# Patient Record
Sex: FEMALE | Race: BLACK OR AFRICAN AMERICAN | NOT HISPANIC OR LATINO | Employment: UNEMPLOYED | URBAN - METROPOLITAN AREA
[De-identification: names, ages, dates, MRNs, and addresses within clinical notes are randomized per-mention and may not be internally consistent; named-entity substitution may affect disease eponyms.]

---

## 2017-02-06 ENCOUNTER — HOSPITAL ENCOUNTER (EMERGENCY)
Facility: OTHER | Age: 62
Discharge: HOME | End: 2017-02-06
Attending: EMERGENCY MEDICINE
Payer: MEDICAID

## 2017-02-06 VITALS
HEART RATE: 72 BPM | OXYGEN SATURATION: 99 % | WEIGHT: 220 LBS | BODY MASS INDEX: 40.48 KG/M2 | SYSTOLIC BLOOD PRESSURE: 185 MMHG | RESPIRATION RATE: 16 BRPM | TEMPERATURE: 98 F | DIASTOLIC BLOOD PRESSURE: 81 MMHG | HEIGHT: 62 IN

## 2017-02-06 DIAGNOSIS — I10 ESSENTIAL HYPERTENSION: ICD-10-CM

## 2017-02-06 DIAGNOSIS — M25.511 ACUTE PAIN OF RIGHT SHOULDER: Primary | ICD-10-CM

## 2017-02-06 DIAGNOSIS — R52 PAIN: ICD-10-CM

## 2017-02-06 PROCEDURE — 93010 ELECTROCARDIOGRAM REPORT: CPT | Mod: ,,, | Performed by: INTERNAL MEDICINE

## 2017-02-06 PROCEDURE — 99284 EMERGENCY DEPT VISIT MOD MDM: CPT | Mod: 25

## 2017-02-06 PROCEDURE — 63600175 PHARM REV CODE 636 W HCPCS: Performed by: PHYSICIAN ASSISTANT

## 2017-02-06 PROCEDURE — 96372 THER/PROPH/DIAG INJ SC/IM: CPT

## 2017-02-06 PROCEDURE — 93005 ELECTROCARDIOGRAM TRACING: CPT

## 2017-02-06 RX ORDER — MELOXICAM 7.5 MG/1
7.5 TABLET ORAL DAILY
Qty: 20 TABLET | Refills: 0 | OUTPATIENT
Start: 2017-02-06 | End: 2021-10-12

## 2017-02-06 RX ORDER — KETOROLAC TROMETHAMINE 30 MG/ML
30 INJECTION, SOLUTION INTRAMUSCULAR; INTRAVENOUS
Status: COMPLETED | OUTPATIENT
Start: 2017-02-06 | End: 2017-02-06

## 2017-02-06 RX ADMIN — KETOROLAC TROMETHAMINE 30 MG: 30 INJECTION, SOLUTION INTRAMUSCULAR at 06:02

## 2017-02-06 NOTE — ED PROVIDER NOTES
Encounter Date: 2/6/2017       History     Chief Complaint   Patient presents with    Shoulder Pain     pt c/o right shoulder pain going down right arm for a few weeks.      Review of patient's allergies indicates:  No Known Allergies  HPI Comments: Patient is a 61 y.o. female with a past medical history of HTN, DM, and afib, presenting to the emergency department with complaints of shoulder pain.  The patient reports that her right shoulder has been persistently hurting for approximately one month.  She states the pain originates in the shoulder, and at times shoots down her arm.  She states the pain is aching, 6/10.  She denies numbness, tingling, weakness.  She reports the pain worsens with movement.  She states she's been taking Tylenol and ibuprofen with minimal relief.  She denies recent injury or trauma.  She denies associated chest pain or shortness of breath.  Of note, she states she did not take any of her daily medications today her current pain level.    The history is provided by the patient.     Past Medical History   Diagnosis Date    Atrial fibrillation     Diabetes mellitus     Hypertension      No past medical history pertinent negatives.  Past Surgical History   Procedure Laterality Date    Hysterectomy       History reviewed. No pertinent family history.  Social History   Substance Use Topics    Smoking status: Never Smoker    Smokeless tobacco: None    Alcohol use No     Review of Systems   Constitutional: Negative for activity change, appetite change, chills, fatigue and fever.   HENT: Negative for congestion, rhinorrhea, sinus pressure, sneezing, sore throat and trouble swallowing.    Eyes: Negative for photophobia and visual disturbance.   Respiratory: Negative for cough, chest tightness, shortness of breath and wheezing.    Cardiovascular: Negative for chest pain and palpitations.   Gastrointestinal: Negative for abdominal pain, constipation, diarrhea, nausea and vomiting.    Genitourinary: Negative for dysuria, hematuria and urgency.   Musculoskeletal: Positive for arthralgias, joint swelling and myalgias. Negative for back pain, neck pain and neck stiffness.   Skin: Negative for color change and wound.   Neurological: Negative for dizziness, weakness, light-headedness, numbness and headaches.   Psychiatric/Behavioral: Negative for agitation and confusion.       Physical Exam   Initial Vitals   BP Pulse Resp Temp SpO2   02/06/17 1612 02/06/17 1612 02/06/17 1612 02/06/17 1612 02/06/17 1612   173/100 88 18 98 °F (36.7 °C) 98 %     Physical Exam    Nursing note and vitals reviewed.  Constitutional: She appears well-developed and well-nourished. She is not diaphoretic.  Non-toxic appearance. She does not have a sickly appearance. She does not appear ill. No distress.   Well appearing, obese, -American female unaccompanied in the emergency department.  She is speaking in clear and full sentences, moving all extremities, ambulates without difficulty.  She is in no acute distress.   HENT:   Head: Normocephalic and atraumatic.   Right Ear: Hearing, tympanic membrane, external ear and ear canal normal.   Left Ear: Hearing, tympanic membrane, external ear and ear canal normal.   Nose: Nose normal.   Mouth/Throat: Oropharynx is clear and moist.   Eyes: Conjunctivae and EOM are normal.   Neck: Normal range of motion. Neck supple.   Cardiovascular: Normal rate, regular rhythm and normal heart sounds.   Pulmonary/Chest: Breath sounds normal. No respiratory distress. She has no wheezes. She has no rhonchi. She has no rales.   Abdominal: Soft. Bowel sounds are normal. She exhibits no distension. There is no tenderness. There is no rebound and no guarding.   Musculoskeletal: Normal range of motion.   Tenderness to palpation of the right shoulder with no palpable deformity, crepitus, step-off.  Minimally decreased range of motion secondary to pain.  Good strength, sensation, pulses.    Neurological: She is alert and oriented to person, place, and time. No cranial nerve deficit or sensory deficit. GCS eye subscore is 4. GCS verbal subscore is 5. GCS motor subscore is 6.   Skin: Skin is warm.   Psychiatric: She has a normal mood and affect. Her behavior is normal. Judgment and thought content normal.         ED Course   Procedures  Labs Reviewed - No data to display  EKG Readings: (Independently Interpreted)   Initial Reading: No STEMI. Rhythm: Normal Sinus Rhythm. Heart Rate: 78. Axis: Left Axis Deviation.     Imaging Results         X-Ray Shoulder Trauma Right (Final result) Result time:  02/06/17 18:58:22    Final result by Duncan Campo MD (02/06/17 18:58:22)    Impression:       No evidence for fracture or dislocation involving the right shoulder.              Electronically signed by: DUNCAN CAMPO MD  Date:     02/06/17  Time:    18:58     Narrative:    Exam: [id number]  02/06/17  18:16:10 [id number] ([facility]) : XR SHOULDER TRAUMA 3 VIEW RIGHT    Technique:    3 x-rays of the right shoulder.    Comparison:    10/23/16    Findings:      There is no evidence of a fracture or dislocation involving the right shoulder.  There are degenerative changes involving the right acromioclavicular joint.  The coracoclavicular interval is within normal limits.  The visualized lung field is clear.  There is no evidence of a pneumothorax.  No airspace opacities are present.             X-Rays:   Independently Interpreted Readings:   Other Readings:  X-ray right shoulder - no acute fracture dislocation    Medical Decision Making:   Independently Interpreted Test(s):   I have ordered and independently interpreted X-rays - see prior notes.  Clinical Tests:   Radiological Study: Ordered and Reviewed  Other:   I have discussed this case with another health care provider.       <> Summary of the Discussion: Dr. Gonzalez  This note was created using [employer]. There may be typographical errors secondary to  dictation.     Urgent evaluation of a 61 y.o. female with a past medical history of HTN, DM, afib, presenting to the emergency department complaining of right shoulder pain x1 month. Patient is afebrile, nontoxic appearing, hemodynamically stable and neurovascularly intact.  Physical exam reveals regular rate and rhythm, lungs are clear to auscultation bilaterally.  Tenderness palpation of the right anterior shoulder with no palpable deformity, crepitus, step-off.  Of note, the patient's blood pressure is elevated in the emergency department 173/100.  Patient reports she has a known history of hypertension, did not take any of her daily medications today.  EKG shows normal sinus rhythm with a rate of 78 bpm, no STEMI.  Will plan to administer Toradol, obtain x-ray, and reassess.  X-ray shows no acute fracture dislocation.  At this time, do feel the patient's signs and symptoms are consistent with a musculoskeletal etiology.  She will be given a prescription for Mobic, and counseled on symptomatic care and treatment.  I did recommend that if her pain persists, she needs to follow-up with orthopedics.  She stable for discharge home. The patient was instructed to follow up with a primary care provider in 2 days or to return to the emergency department for worsening symptoms. The treatment plan was discussed with the patient who demonstrated understanding and comfort with plan. The patient's history, physical exam, and plan of care was discussed with and agreed upon with my supervising physician.     Past Medical History   Diagnosis Date    Atrial fibrillation     Diabetes mellitus     Hypertension                      ED Course     Clinical Impression:     1. Acute pain of right shoulder    2. Pain    3. Essential hypertension       Disposition:   Disposition: Discharged  Condition: Stable       Aury Blankenship PA-C  02/06/17 7025
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
## 2017-02-06 NOTE — ED AVS SNAPSHOT
[facility]  [address] [address]               Annie Matthews   2017  5:18 PM   ED    Description:  Female : 1955   Department:  [facility]           Your Care was Coordinated By:     Provider Role From To    Joann Mckeon MD Attending Provider 17 --    Aury Blankenship PA-C Physician Assistant 17 --      Reason for Visit     Shoulder Pain           Diagnoses this Visit        Comments    Acute pain of right shoulder    -  Primary     Pain           ED Disposition     None           To Do List           Follow-up Information     Follow up with Michael Garvey MD In 2 days.    Specialty:  Orthopedic Surgery    Contact information:    [address]  [address]  [facility] [id number] [phone]          Follow up with [facility].    Specialty:  Emergency Medicine    Why:  If symptoms worsen    Contact information:    [address] [address] [phone]       These Medications        Disp Refills Start End    meloxicam (MOBIC) 7.5 MG tablet 20 tablet 0 2017     Take 1 tablet (7.5 mg total) by mouth once daily. - Oral      [facility]     [facility] -  Assistance  Registered nurses in the [facility] provide clinical advisement, health education, appointment booking, and other advisory services.  Call for this free service at [phone].             Medications           Message regarding Medications     Verify the changes and/or additions to your medication regime listed below are the same as discussed with your clinician today.  If any of these changes or additions are incorrect, please notify your healthcare provider.        START taking these NEW medications        Refills    meloxicam (MOBIC) 7.5 MG tablet 0    Sig: Take 1 tablet (7.5 mg total) by mouth once daily.    Class: Print    Route: Oral  "     These medications were administered today        Dose Freq    ketorolac injection 30 mg 30 mg ED 1 Time    Sig: Inject 30 mg into the muscle ED 1 Time.    Class: Normal    Route: Intramuscular           Verify that the below list of medications is an accurate representation of the medications you are currently taking.  If none reported, the list may be blank. If incorrect, please contact your healthcare provider. Carry this list with you in case of emergency.           Current Medications     aspirin (ECOTRIN) 81 MG EC tablet Take 81 mg by mouth once daily.    atorvastatin (LIPITOR) 10 MG tablet Take 10 mg by mouth once daily.    carvedilol (COREG) 12.5 MG tablet Take 12.5 mg by mouth 2 (two) times daily with meals.    glipiZIDE (GLUCOTROL) 10 MG tablet Take 10 mg by mouth 2 (two) times daily before meals.    ibuprofen (ADVIL,MOTRIN) 600 MG tablet Take 1 tablet (600 mg total) by mouth every 6 (six) hours as needed for Pain.    lisinopril (PRINIVIL,ZESTRIL) 40 MG tablet Take 40 mg by mouth once daily.    metformin (GLUCOPHAGE) 500 MG tablet Take 500 mg by mouth 2 (two) times daily with meals.    meloxicam (MOBIC) 7.5 MG tablet Take 1 tablet (7.5 mg total) by mouth once daily.           Clinical Reference Information           Your Vitals Were     BP Pulse Temp Resp Height Weight    173/100 (BP Location: Left arm, Patient Position: Sitting) 88 98 °F (36.7 °C) (Oral) 18 5' 2" (1.575 m) 99.8 kg (220 lb)    SpO2 BMI             98% 40.24 kg/m2         Allergies as of 2/6/2017     No Known Allergies      Immunizations Administered on Date of Encounter - 2/6/2017     None      ED Micro, Lab, POCT     None      ED Imaging Orders     Start Ordered       Status Ordering Provider    02/06/17 1739 02/06/17 1738  X-Ray Shoulder Trauma Right  1 time imaging      Final result       Discharge References/Attachments     ARTHRALGIA (ENGLISH)    SHOULDER PAIN, UNCERTAIN CAUSE (ENGLISH)    SHOULDER JOINT, THE (ENGLISH)    "   [facility] Sign-Up     Activating your [facility] account is as easy as 1-2-3!     1) Visit [email or web address], select Sign Up Now, enter this activation code and your date of birth, then select Next.  [email or web address]  Expires: 3/23/2017  7:03 PM      2) Create a username and password to use when you visit [facility] in the future and select a security question in case you lose your password and select Next.    3) Enter your e-mail address and click Sign Up!    Additional Information  If you have questions, please e-mail [email or web address] or call [phone] to talk to our [facility] staff. Remember, [facility] is NOT to be used for urgent needs. For medical emergencies, dial 911.          [facility] complies with applicable Federal civil rights laws and does not discriminate on the basis of race, color, national origin, age, disability, or sex.        Language Assistance Services     ATTENTION: Language assistance services are available, free of charge. Please call [phone].      ATENCIÓN: Si habla español, tiene a segura disposición servicios gratuitos de asistencia lingüística. Llame al [phone].     CHÚ Ý: N?u b?n nói Ti?ng Vi?t, có các d?ch v? h? tr? ngôn ng? mi?n phí dành cho b?n. G?i s? [phone].        Medications Administered     ketorolac injection 30 mg                    Administrations This Visit        Admin Date Action                   ketorolac injection 30 mg 02/06/2017 Given                  Administrations This Visit     ketorolac injection 30 mg     Admin Date Action Dose Route Administered By             02/06/2017 Given 30 mg Intramuscular Miley Kelly LPN

## 2018-05-05 ENCOUNTER — OFFICE VISIT (OUTPATIENT)
Dept: URGENT CARE | Facility: CLINIC | Age: 63
End: 2018-05-05
Payer: MEDICAID

## 2018-05-05 VITALS
SYSTOLIC BLOOD PRESSURE: 130 MMHG | OXYGEN SATURATION: 96 % | TEMPERATURE: 98 F | BODY MASS INDEX: 40.12 KG/M2 | WEIGHT: 218 LBS | HEIGHT: 62 IN | RESPIRATION RATE: 20 BRPM | DIASTOLIC BLOOD PRESSURE: 86 MMHG

## 2018-05-05 DIAGNOSIS — R06.2 WHEEZING: ICD-10-CM

## 2018-05-05 DIAGNOSIS — H65.93 BILATERAL NON-SUPPURATIVE OTITIS MEDIA: ICD-10-CM

## 2018-05-05 DIAGNOSIS — J32.9 SINUSITIS, UNSPECIFIED CHRONICITY, UNSPECIFIED LOCATION: ICD-10-CM

## 2018-05-05 DIAGNOSIS — J40 BRONCHITIS: Primary | ICD-10-CM

## 2018-05-05 PROCEDURE — 94640 AIRWAY INHALATION TREATMENT: CPT | Mod: S$GLB,,, | Performed by: FAMILY MEDICINE

## 2018-05-05 PROCEDURE — 99203 OFFICE O/P NEW LOW 30 MIN: CPT | Mod: 25,S$GLB,, | Performed by: FAMILY MEDICINE

## 2018-05-05 RX ORDER — AMOXICILLIN 500 MG/1
1000 CAPSULE ORAL EVERY 12 HOURS
Qty: 40 CAPSULE | Refills: 0 | Status: SHIPPED | OUTPATIENT
Start: 2018-05-05 | End: 2018-05-15

## 2018-05-05 RX ORDER — ALBUTEROL SULFATE 0.83 MG/ML
2.5 SOLUTION RESPIRATORY (INHALATION)
Status: COMPLETED | OUTPATIENT
Start: 2018-05-05 | End: 2018-05-05

## 2018-05-05 RX ORDER — PROMETHAZINE HYDROCHLORIDE AND CODEINE PHOSPHATE 6.25; 1 MG/5ML; MG/5ML
5 SOLUTION ORAL EVERY 6 HOURS PRN
Qty: 118 ML | Refills: 0 | OUTPATIENT
Start: 2018-05-05 | End: 2021-10-12

## 2018-05-05 RX ORDER — CEFTRIAXONE 1 G/1
1 INJECTION, POWDER, FOR SOLUTION INTRAMUSCULAR; INTRAVENOUS
Status: COMPLETED | OUTPATIENT
Start: 2018-05-05 | End: 2018-05-05

## 2018-05-05 RX ADMIN — CEFTRIAXONE 1 G: 1 INJECTION, POWDER, FOR SOLUTION INTRAMUSCULAR; INTRAVENOUS at 03:05

## 2018-05-05 RX ADMIN — ALBUTEROL SULFATE 2.5 MG: 0.83 SOLUTION RESPIRATORY (INHALATION) at 03:05

## 2018-05-05 NOTE — PROGRESS NOTES
"Subjective:       Patient ID: Annie Matthews is a 62 y.o. female.    Vitals:  height is 5' 2" (1.575 m) and weight is 98.9 kg (218 lb). Her temperature is 98 °F (36.7 °C). Her blood pressure is 130/86. Her respiration is 20 and oxygen saturation is 96%.     Chief Complaint: Sinus Problem    HPI  ROS    Objective:      Physical Exam    Assessment:       No diagnosis found.    Plan:         There are no diagnoses linked to this encounter.  ***    "

## 2018-05-05 NOTE — PATIENT INSTRUCTIONS
Bronchitis with Wheezing (Viral or Bacterial: Adult)    Bronchitis is an infection of the air passages. It often occurs during a cold and is usually caused by a virus. Symptoms include cough with mucus (phlegm) and low-grade fever. This illness is contagious during the first few days and is spread through the air by coughing and sneezing, or by direct contact (touching the sick person and then touching your own eyes, nose, or mouth).  If there is a lot of inflammation, air flow is restricted. The air passages may also go into spasm, especially if you have asthma. This causes wheezing and difficulty breathing even in people who do not have asthma.  Bronchitis usually lasts 7 to 14 days. The wheezing should improve with treatment during the first week. An inhaler is often prescribed to relax the air passages and stop wheezing. Antibiotics will be prescribed if your doctor thinks there is also a secondary bacterial infection.  Home care  · If symptoms are severe, rest at home for the first 2 to 3 days. When you go back to your usual activities, don't let yourself get too tired.  · Do not smoke. Also avoid being exposed to secondhand smoke.  · You may use over-the-counter medicine to control fever or pain, unless another medicine was prescribed. Note: If you have chronic liver or kidney disease or have ever had a stomach ulcer or gastrointestinal bleeding, talk with your healthcare provider before using these medicines. Also talk to your provider if you are taking medicine to prevent blood clots.) Aspirin should never be given to anyone younger than 18 years of age who is ill with a viral infection or fever. It may cause severe liver or brain damage.  · Your appetite may be poor, so a light diet is fine. Avoid dehydration by drinking 6 to 8 glasses of fluids per day (such as water, soft drinks, sports drinks, juices, tea, or soup). Extra fluids will help loosen secretions in the nose and lungs.  · Over-the-counter  cough, cold, and sore-throat medicines will not shorten the length of the illness, but they may be helpful to reduce symptoms. (Note: Do not use decongestants if you have high blood pressure.)  · If you were given an inhaler, use it exactly as directed. If you need to use it more often than prescribed, your condition may be worsening. If this happens, contact your healthcare provider.  · If prescribed, finish all antibiotic medicine, even if you are feeling better after only a few days.  Follow-up care  Follow up with your healthcare provider, or as advised. If you had an X-ray or ECG (electrocardiogram), a specialist will review it. You will be notified of any new findings that may affect your care.  Note: If you are age 65 or older, or if you have a chronic lung disease or condition that affects your immune system, or you smoke, talk to your healthcare provider about having a pneumococcal vaccinations and a yearly influenza vaccination (flu shot).  When to seek medical advice  Call your healthcare provider right away if any of these occur:  · Fever of 100.4°F (38°C) or higher  · Coughing up increasing amounts of colored sputum  · Weakness, drowsiness, headache, facial pain, ear pain, or a stiff neck  Call 911, or get immediate medical care  Contact emergency services right away if any of these occur.  · Coughing up blood  · Worsening weakness, drowsiness, headache, or stiff neck  · Increased wheezing not helped with medication, shortness of breath, or pain with breathing  Date Last Reviewed: 9/13/2015  © 3948-0751 [employer]. [address] [place] [address], [place] [address]. All rights reserved. This information is not intended as a substitute for professional medical care. Always follow your healthcare professional's instructions.        Sinusitis (Antibiotic Treatment)    The sinuses are air-filled spaces within the bones of the face. They connect to the inside of the nose. Sinusitis is an inflammation  of the tissue lining the sinus cavity. Sinus inflammation can occur during a cold. It can also be due to allergies to pollens and other particles in the air. Sinusitis can cause symptoms of sinus congestion and fullness. A sinus infection causes fever, headache and facial pain. There is often green or yellow drainage from the nose or into the back of the throat (post-nasal drip). You have been given antibiotics to treat this condition.  Home care:  · Take the full course of antibiotics as instructed. Do not stop taking them, even if you feel better.  · Drink plenty of water, hot tea, and other liquids. This may help thin mucus. It also may promote sinus drainage.  · Heat may help soothe painful areas of the face. Use a towel soaked in hot water. Or,  the shower and direct the hot spray onto your face. Using a vaporizer along with a menthol rub at night may also help.   · An expectorant containing guaifenesin may help thin the mucus and promote drainage from the sinuses.  · Over-the-counter decongestants may be used unless a similar medicine was prescribed. Nasal sprays work the fastest. Use one that contains phenylephrine or oxymetazoline. First blow the nose gently. Then use the spray. Do not use these medicines more often than directed on the label or symptoms may get worse. You may also use tablets containing pseudoephedrine. Avoid products that combine ingredients, because side effects may be increased. Read labels. You can also ask the pharmacist for help. (NOTE: Persons with high blood pressure should not use decongestants. They can raise blood pressure.)  · Over-the-counter antihistamines may help if allergies contributed to your sinusitis.    · Do not use nasal rinses or irrigation during an acute sinus infection, unless told to by your health care provider. Rinsing may spread the infection to other sinuses.  · Use acetaminophen or ibuprofen to control pain, unless another pain medicine was  prescribed. (If you have chronic liver or kidney disease or ever had a stomach ulcer, talk with your doctor before using these medicines. Aspirin should never be used in anyone under 18 years of age who is ill with a fever. It may cause severe liver damage.)  · Don't smoke. This can worsen symptoms.  Follow-up care  Follow up with your healthcare provider or our staff if you are not improving within the next week.  When to seek medical advice  Call your healthcare provider if any of these occur:  · Facial pain or headache becoming more severe  · Stiff neck  · Unusual drowsiness or confusion  · Swelling of the forehead or eyelids  · Vision problems, including blurred or double vision  · Fever of 100.4ºF (38ºC) or higher, or as directed by your healthcare provider  · Seizure  · Breathing problems  · Symptoms not resolving within 10 days  Date Last Reviewed: 4/13/2015  © 6691-5863 [employer]. [address] [place] [address]. All rights reserved. This information is not intended as a substitute for professional medical care. Always follow your healthcare professional's instructions.                                                                    Sinusitis   If your condition worsens or fails to improve we recommend that you receive another evaluation at the ER immediately or contact your PCP to discuss your concerns or return here. You must understand that you've received an urgent care treatment only and that you may be released before all your medical problems are known or treated. You the patient will arrange for followup care as instructed.   If we discussed that I think your illness is viral it will not respond to antibiotics and it will last 10-14 days. However, if over the next few days the symptoms worsen start the antibiotics I have given you.   If we discussed that you require antibiotics start them now and take them to completion.   If you are female and on BCP and do take the  "antibiotics, use additional methods to prevent pregnancy while on the antibiotics and for one cycle after.   Flonase (fluticasone) is a nasal spray which is available over the counter and may help with your symptoms   Zyrtec D, Claritin D or allegra D can also help with symptoms of congestion and drainage.   If you have hypertension avoid using the "D" which is the decongestant   If you just have drainage you can take plain zyrtec, claritin or allegra   If you just have a congested feeling you can take pseudoephedrine (unless you have high blood pressure) which you have to sign for behind the counter. Do not buy the phenylephrine which is on the shelf as it is not effective   Rest and fluids are also important.   Tylenol or ibuprofen can also be used as directed for pain unless you have an allergy to them or medical condition such as stomach ulcers, kidney or liver disease or blood thinners etc for which you should not be taking these type of medications.   If you are flying in the next few days Afrin nose drops for the airplane flight upon take off and landing may help. Other than at those times refrain from using afrin.   If you were prescribed a narcotic do not drive or operate heavy machinery while taking these medications.       "

## 2018-05-05 NOTE — PROGRESS NOTES
"Subjective:       Patient ID: Annie Matthews is a 62 y.o. female.    Vitals:  height is 5' 2" (1.575 m) and weight is 98.9 kg (218 lb). Her temperature is 98 °F (36.7 °C). Her blood pressure is 130/86. Her respiration is 20 and oxygen saturation is 96%.     Chief Complaint: Sinus Problem    Sinus Problem   This is a new problem. The current episode started in the past 7 days. The problem has been gradually worsening since onset. There has been no fever. Associated symptoms include congestion, coughing, shortness of breath, sinus pressure and a sore throat. Pertinent negatives include no chills, ear pain, headaches or hoarse voice. Past treatments include acetaminophen. The treatment provided mild relief.     Review of Systems   Constitution: Negative for chills, fever and malaise/fatigue.   HENT: Positive for congestion, sinus pressure and sore throat. Negative for ear pain and hoarse voice.    Eyes: Negative for discharge and redness.   Cardiovascular: Negative for chest pain, dyspnea on exertion and leg swelling.   Respiratory: Positive for cough, shortness of breath and sputum production. Negative for wheezing.    Musculoskeletal: Negative for myalgias.   Gastrointestinal: Negative for abdominal pain and nausea.   Neurological: Negative for headaches.       Objective:      Physical Exam   Constitutional: She is oriented to person, place, and time. She appears well-developed and well-nourished.   HENT:   Head: Normocephalic and atraumatic. Head is without abrasion, without contusion and without laceration.   Right Ear: External ear normal. Tympanic membrane is injected and bulging.   Left Ear: External ear normal. Tympanic membrane is injected and bulging.   Nose: Right sinus exhibits maxillary sinus tenderness and frontal sinus tenderness. Left sinus exhibits maxillary sinus tenderness and frontal sinus tenderness.   Mouth/Throat: Posterior oropharyngeal erythema present.   Eyes: Conjunctivae and lids are " normal. Pupils are equal, round, and reactive to light.   Neck: Trachea normal, full passive range of motion without pain and phonation normal. Neck supple.   Cardiovascular: Normal rate, regular rhythm and normal heart sounds.    Pulmonary/Chest: Effort normal. No stridor. No respiratory distress. She has no decreased breath sounds. She has wheezes. She has rhonchi.   Prior to breathing treatment there are fine diffuse expiratory wheezes with a few rhonchi that clear after cough.  No rales     Only slight improvement with albuterol.  Patient still with cough.  Non productive.   Still with scattered rhonchi.  Wheezes are improved.  Patient reports only feeling slightly improved.     Abdominal:   Obese in appearance   Musculoskeletal: Normal range of motion.   Lymphadenopathy:        Head (right side): No submental and no submandibular adenopathy present.        Head (left side): No submental and no submandibular adenopathy present.     She has cervical adenopathy.   Neurological: She is alert and oriented to person, place, and time.   Skin: Skin is warm, dry and intact. Capillary refill takes less than 2 seconds. No abrasion, no bruising, no burn, no ecchymosis, no laceration, no lesion and no rash noted. No erythema.   Psychiatric: She has a normal mood and affect. Her speech is normal and behavior is normal. Judgment and thought content normal. Cognition and memory are normal.   Nursing note and vitals reviewed.      Assessment:       1. Bronchitis    2. Wheezing    3. Sinusitis, unspecified chronicity, unspecified location    4. Bilateral non-suppurative otitis media        Plan:         Bronchitis  -     cefTRIAXone injection 1 g; Inject 1 g into the muscle one time.  -     amoxicillin (AMOXIL) 500 MG capsule; Take 2 capsules (1,000 mg total) by mouth every 12 (twelve) hours.  Dispense: 40 capsule; Refill: 0  -     promethazine-codeine 6.25-10 mg/5 ml (PHENERGAN WITH CODEINE) 6.25-10 mg/5 mL syrup; Take 5 mLs by  mouth every 6 (six) hours as needed.  Dispense: 118 mL; Refill: 0    Wheezing  -     albuterol nebulizer solution 2.5 mg; Take 3 mLs (2.5 mg total) by nebulization one time.    Sinusitis, unspecified chronicity, unspecified location  -     cefTRIAXone injection 1 g; Inject 1 g into the muscle one time.  -     amoxicillin (AMOXIL) 500 MG capsule; Take 2 capsules (1,000 mg total) by mouth every 12 (twelve) hours.  Dispense: 40 capsule; Refill: 0    Bilateral non-suppurative otitis media      Follow Up Comments   Make sure that you follow up with your primary care doctor in the next 2-5 days if needed .  Return to the Urgent Care if signs or symptoms change and certainly if you have worsening symptoms go to the nearest emergency department for further evaluation.

## 2018-05-06 RX ORDER — FLUCONAZOLE 150 MG/1
150 TABLET ORAL DAILY
Qty: 2 TABLET | Refills: 0 | Status: SHIPPED | OUTPATIENT
Start: 2018-05-06 | End: 2018-05-07

## 2018-10-05 ENCOUNTER — HOSPITAL ENCOUNTER (EMERGENCY)
Facility: OTHER | Age: 63
Discharge: HOME | End: 2018-10-05
Attending: EMERGENCY MEDICINE
Payer: MEDICAID

## 2018-10-05 VITALS
DIASTOLIC BLOOD PRESSURE: 80 MMHG | HEART RATE: 64 BPM | WEIGHT: 200 LBS | SYSTOLIC BLOOD PRESSURE: 180 MMHG | BODY MASS INDEX: 36.8 KG/M2 | TEMPERATURE: 98 F | HEIGHT: 62 IN | RESPIRATION RATE: 22 BRPM | OXYGEN SATURATION: 99 %

## 2018-10-05 DIAGNOSIS — R05.9 COUGH: ICD-10-CM

## 2018-10-05 DIAGNOSIS — J40 BRONCHITIS: Primary | ICD-10-CM

## 2018-10-05 LAB
ALBUMIN SERPL BCP-MCNC: 3.5 G/DL
ALP SERPL-CCNC: 67 U/L
ALT SERPL W/O P-5'-P-CCNC: 27 U/L
ANION GAP SERPL CALC-SCNC: 12 MMOL/L
AST SERPL-CCNC: 17 U/L
BASOPHILS # BLD AUTO: 0.02 K/UL
BASOPHILS NFR BLD: 0.2 %
BILIRUB SERPL-MCNC: 0.6 MG/DL
BUN SERPL-MCNC: 14 MG/DL
CALCIUM SERPL-MCNC: 9.1 MG/DL
CHLORIDE SERPL-SCNC: 105 MMOL/L
CO2 SERPL-SCNC: 25 MMOL/L
CREAT SERPL-MCNC: 1 MG/DL
DIFFERENTIAL METHOD: ABNORMAL
EOSINOPHIL # BLD AUTO: 0.2 K/UL
EOSINOPHIL NFR BLD: 2.5 %
ERYTHROCYTE [DISTWIDTH] IN BLOOD BY AUTOMATED COUNT: 13.7 %
EST. GFR  (AFRICAN AMERICAN): >60 ML/MIN/1.73 M^2
EST. GFR  (NON AFRICAN AMERICAN): >60 ML/MIN/1.73 M^2
FLUAV AG SPEC QL IA: NEGATIVE
FLUBV AG SPEC QL IA: NEGATIVE
GLUCOSE SERPL-MCNC: 172 MG/DL
HCT VFR BLD AUTO: 40.7 %
HGB BLD-MCNC: 13 G/DL
LYMPHOCYTES # BLD AUTO: 4.6 K/UL
LYMPHOCYTES NFR BLD: 54.6 %
MCH RBC QN AUTO: 28.2 PG
MCHC RBC AUTO-ENTMCNC: 31.9 G/DL
MCV RBC AUTO: 88 FL
MONOCYTES # BLD AUTO: 0.6 K/UL
MONOCYTES NFR BLD: 6.8 %
NEUTROPHILS # BLD AUTO: 3 K/UL
NEUTROPHILS NFR BLD: 35.8 %
PLATELET # BLD AUTO: 263 K/UL
PMV BLD AUTO: 9.6 FL
POTASSIUM SERPL-SCNC: 4 MMOL/L
PROT SERPL-MCNC: 7.1 G/DL
RBC # BLD AUTO: 4.61 M/UL
SODIUM SERPL-SCNC: 142 MMOL/L
SPECIMEN SOURCE: NORMAL
WBC # BLD AUTO: 8.4 K/UL

## 2018-10-05 PROCEDURE — 94761 N-INVAS EAR/PLS OXIMETRY MLT: CPT

## 2018-10-05 PROCEDURE — 96374 THER/PROPH/DIAG INJ IV PUSH: CPT

## 2018-10-05 PROCEDURE — 80053 COMPREHEN METABOLIC PANEL: CPT

## 2018-10-05 PROCEDURE — 99284 EMERGENCY DEPT VISIT MOD MDM: CPT | Mod: 25

## 2018-10-05 PROCEDURE — 25000003 PHARM REV CODE 250: Performed by: PHYSICIAN ASSISTANT

## 2018-10-05 PROCEDURE — 87400 INFLUENZA A/B EACH AG IA: CPT

## 2018-10-05 PROCEDURE — 94640 AIRWAY INHALATION TREATMENT: CPT

## 2018-10-05 PROCEDURE — 25000242 PHARM REV CODE 250 ALT 637 W/ HCPCS: Performed by: PHYSICIAN ASSISTANT

## 2018-10-05 PROCEDURE — 85025 COMPLETE CBC W/AUTO DIFF WBC: CPT

## 2018-10-05 PROCEDURE — 63600175 PHARM REV CODE 636 W HCPCS: Performed by: PHYSICIAN ASSISTANT

## 2018-10-05 RX ORDER — IPRATROPIUM BROMIDE AND ALBUTEROL SULFATE 2.5; .5 MG/3ML; MG/3ML
3 SOLUTION RESPIRATORY (INHALATION)
Status: COMPLETED | OUTPATIENT
Start: 2018-10-05 | End: 2018-10-05

## 2018-10-05 RX ORDER — METHYLPREDNISOLONE 4 MG/1
TABLET ORAL
Qty: 1 PACKAGE | Refills: 0 | Status: SHIPPED | OUTPATIENT
Start: 2018-10-05 | End: 2018-10-05 | Stop reason: ALTCHOICE

## 2018-10-05 RX ORDER — BENZONATATE 100 MG/1
100 CAPSULE ORAL 3 TIMES DAILY PRN
Qty: 20 CAPSULE | Refills: 0 | Status: SHIPPED | OUTPATIENT
Start: 2018-10-05 | End: 2018-10-15

## 2018-10-05 RX ORDER — ALBUTEROL SULFATE 90 UG/1
1-2 AEROSOL, METERED RESPIRATORY (INHALATION) EVERY 6 HOURS PRN
Qty: 1 INHALER | Refills: 0 | OUTPATIENT
Start: 2018-10-05 | End: 2021-12-21

## 2018-10-05 RX ORDER — IBUPROFEN 600 MG/1
600 TABLET ORAL
Status: COMPLETED | OUTPATIENT
Start: 2018-10-05 | End: 2018-10-05

## 2018-10-05 RX ORDER — PREDNISONE 20 MG/1
40 TABLET ORAL DAILY
Qty: 8 TABLET | Refills: 0 | Status: SHIPPED | OUTPATIENT
Start: 2018-10-05 | End: 2018-10-09

## 2018-10-05 RX ADMIN — IPRATROPIUM BROMIDE AND ALBUTEROL SULFATE 3 ML: .5; 3 SOLUTION RESPIRATORY (INHALATION) at 11:10

## 2018-10-05 RX ADMIN — IBUPROFEN 600 MG: 600 TABLET ORAL at 11:10

## 2018-10-05 RX ADMIN — IPRATROPIUM BROMIDE AND ALBUTEROL SULFATE 3 ML: .5; 3 SOLUTION RESPIRATORY (INHALATION) at 12:10

## 2018-10-05 RX ADMIN — METHYLPREDNISOLONE SODIUM SUCCINATE 80 MG: 40 INJECTION, POWDER, FOR SOLUTION INTRAMUSCULAR; INTRAVENOUS at 01:10

## 2018-10-05 NOTE — ED NOTES
Patient ambulated in [place] beginning O2 sats @ 98% on RA, Sats at 95 % upon return to room.  Provider informed.

## 2018-10-05 NOTE — ED NOTES
Rounding has been complete. Pt resting on recliner, Pulse ox reading 88%. Applied 2L of oxygen. Pt AAOx3. Call bell within reach. Will continue to monitor.

## 2018-10-05 NOTE — ED PROVIDER NOTES
Encounter Date: 10/5/2018       History     Chief Complaint   Patient presents with    Cough     Pt c/o productive green cough with intermittent SOB and wheezing since friday.      Patient is a 63-year-old female with a past medical history of AFib, hypertension, diabetes, presenting to the emergency department with complaints of cough, congestion, rhinorrhea and sore throat.  The patient states her symptoms started approximately 1 week ago and have progressively worsened.  She states her cough is productive with green mucus.  She denies any fever chills.  She does report some shortness of breath. She admits that in the past she has had to require breathing treatments for her cough but denies any history of asthma, COPD, or tobacco use.  She states she has tried over-the-counter cold medicine with no significant improvement.  No known sick contacts.This is the extent of the patient's complaints at this time.         The history is provided by the patient.     Review of patient's allergies indicates:  No Known Allergies  Past Medical History:   Diagnosis Date    Atrial fibrillation     Diabetes mellitus     Hypertension      Past Surgical History:   Procedure Laterality Date    HYSTERECTOMY       History reviewed. No pertinent family history.  Social History     Tobacco Use    Smoking status: Never Smoker   Substance Use Topics    Alcohol use: No    Drug use: No     Review of Systems   Constitutional: Positive for fatigue. Negative for activity change and appetite change.   HENT: Positive for congestion, rhinorrhea and sore throat.    Eyes: Negative for photophobia and visual disturbance.   Respiratory: Positive for cough. Negative for shortness of breath and wheezing.    Cardiovascular: Negative for chest pain.   Gastrointestinal: Negative for abdominal pain, diarrhea, nausea and vomiting.   Genitourinary: Negative for dysuria, hematuria and urgency.   Musculoskeletal: Negative for back pain, myalgias and  neck pain.   Skin: Negative for color change and wound.   Neurological: Negative for weakness and headaches.   Psychiatric/Behavioral: Negative for agitation and confusion.       Physical Exam     Initial Vitals [10/05/18 0927]   BP Pulse Resp Temp SpO2   136/75 97 (!) 24 99.2 °F (37.3 °C) 98 %      MAP       --         Physical Exam    Nursing note and vitals reviewed.  Constitutional: Vital signs are normal. She appears well-developed and well-nourished. She is not diaphoretic. She is cooperative.  Non-toxic appearance. She does not have a sickly appearance. She does not appear ill. No distress.   Well-appearing  female accompanied the emergency department.  Speaking clearly in full sentences.  No acute distress.   HENT:   Head: Normocephalic and atraumatic.   Right Ear: Hearing, tympanic membrane, external ear and ear canal normal.   Left Ear: Hearing, tympanic membrane, external ear and ear canal normal.   Nose: Mucosal edema and rhinorrhea present.   Mouth/Throat: Uvula is midline, oropharynx is clear and moist and mucous membranes are normal.   Eyes: Conjunctivae and EOM are normal.   Neck: Normal range of motion. Neck supple.   Cardiovascular: Normal rate, regular rhythm and normal heart sounds.   Pulmonary/Chest: No respiratory distress.   Expiratory wheezing noted bilaterally   Musculoskeletal: Normal range of motion.   Neurological: She is alert and oriented to person, place, and time. GCS eye subscore is 4. GCS verbal subscore is 5. GCS motor subscore is 6.   Skin: Skin is warm.   Psychiatric: She has a normal mood and affect. Her behavior is normal. Judgment and thought content normal.         ED Course   Procedures  Labs Reviewed   CBC W/ AUTO DIFFERENTIAL - Abnormal; Notable for the following components:       Result Value    MCHC 31.9 (*)     Gran% 35.8 (*)     Lymph% 54.6 (*)     All other components within normal limits   COMPREHENSIVE METABOLIC PANEL - Abnormal; Notable for the  following components:    Glucose 172 (*)     All other components within normal limits   INFLUENZA A AND B ANTIGEN          Imaging Results          X-Ray Chest PA And Lateral (Final result)  Result time 10/05/18 10:22:08    Final result by Ricardo Mcneil MD (10/05/18 10:22:08)                 Impression:      No acute chest disease identified.      Electronically signed by: Ricardo Mcneil MD  Date:    10/05/2018  Time:    10:22             Narrative:    EXAMINATION:  XR CHEST PA AND LATERAL    CLINICAL HISTORY:  Cough    TECHNIQUE:  PA and lateral views of the chest were performed.    COMPARISON:  08/05/2016.    FINDINGS:  There is an implanted cardiac loop recorder.  The heart is not enlarged.  Superior mediastinal structures are unremarkable.  Pulmonary vasculature is within normal limits.  The lungs are well aerated and free of focal consolidations.  There is no evidence for pneumothorax or pleural effusions.  Bony structures are grossly intact.                              X-Rays:   Independently Interpreted Readings:   Chest X-Ray: Normal heart size.  No infiltrates.  No acute abnormalities.     Medical Decision Making:   Initial Assessment:   Urgent evaluation of a 63-year-old female with a past medical history of hypertension, diabetes, AFib, presenting with complaints of cough, congestion.  Patient is afebrile, nontoxic appearing, hemodynamically stable. Physical exam reveals regular rate and rhythm, x-ray wheezing noted on exam.  Boggy nasal mucosa.  Will plan for x-ray, breathing treatment reassess.  Independently Interpreted Test(s):   I have ordered and independently interpreted X-rays - see prior notes.  Clinical Tests:   Radiological Study: Ordered and Reviewed  ED Management:  X-ray shows no acute process.  Following the initial DuoNeb treatment, nursing staff noted that the patient's oxygen saturation dropped below 90.  She was placed on 2 L of O2 via nasal cannula.  Reassess, persistent wheezing  noted.  Will try another breathing treatment and obtain rapid influenza.  Rapid influenza is negative. Labs are unremarkable.  Patient reports feeling improvement after 2nd breathing treatment.  Ambulated without any supplemental oxygen on O2 monitor, lowest oxygen saturation is 95%.  At this, the patient feels comfortable with discharge. She was given Solu-Medrol in the ED.  Will plan to discharge the patient home with prednisone, tessalon and albuterol. Counseled on further symptomatic care and treatment. The patient was instructed to follow up with a primary care provider in 2 days or to return to the emergency department for worsening symptoms. The treatment plan was discussed with the patient who demonstrated understanding and comfort with plan. The patient's history, physical exam, and plan of care was discussed with and agreed upon with my supervising physician.    Other:   I have discussed this case with another health care provider.  This note was created using [employer]. There may be typographical errors secondary to dictation.                 Attending Attestation:     Physician Attestation Statement for NP/PA:   I have conducted a face to face encounter with this patient in addition to the NP/PA, due to NP/PA Request    Other NP/PA Attestation Additions:      Medical Decision Making:     HTN/DM with one week cough and URI sx with associated wheezing/SOB. No known h/o asthma or smoking, likely URI with bronchospasm. Wheezing on arrival somewhat improved with nebs, after Solumedrol ambulatory in ED with no SOB or hypoxia. CXR no PNA, labs reassuring and flu (-). Will Rx Prednisone for home as well as Albuterol and cough suppressant, continue supportive care for suspected viral URI, discharged with strict return precautions                    Clinical Impression:     1. Bronchitis    2. Cough           Disposition:   Disposition: Discharged  Condition: Stable                        Aury  JEAN-CLAUDE Blankenship  10/05/18 1449       Golden Mcqueen MD  10/08/18 [id number]

## 2018-10-05 NOTE — ED TRIAGE NOTES
Pt reports sinus pressure, cough, and sore throat x 1 week, not relieved by OTC medications. Pt is AAo x3, answers questions appropriately

## 2020-04-27 ENCOUNTER — HOSPITAL ENCOUNTER (EMERGENCY)
Facility: OTHER | Age: 65
Discharge: HOME | End: 2020-04-27
Attending: EMERGENCY MEDICINE
Payer: MEDICAID

## 2020-04-27 VITALS
RESPIRATION RATE: 18 BRPM | OXYGEN SATURATION: 98 % | TEMPERATURE: 99 F | DIASTOLIC BLOOD PRESSURE: 72 MMHG | WEIGHT: 200 LBS | HEIGHT: 62 IN | BODY MASS INDEX: 36.8 KG/M2 | SYSTOLIC BLOOD PRESSURE: 133 MMHG | HEART RATE: 74 BPM

## 2020-04-27 DIAGNOSIS — S76.211A GROIN STRAIN, RIGHT, INITIAL ENCOUNTER: Primary | ICD-10-CM

## 2020-04-27 LAB
BACTERIA #/AREA URNS HPF: NORMAL /HPF
BILIRUB UR QL STRIP: NEGATIVE
CLARITY UR: CLEAR
COLOR UR: YELLOW
GLUCOSE UR QL STRIP: ABNORMAL
HGB UR QL STRIP: NEGATIVE
KETONES UR QL STRIP: NEGATIVE
LEUKOCYTE ESTERASE UR QL STRIP: NEGATIVE
MICROSCOPIC COMMENT: NORMAL
NITRITE UR QL STRIP: NEGATIVE
PH UR STRIP: 6 [PH] (ref 5–8)
PROT UR QL STRIP: NEGATIVE
RBC #/AREA URNS HPF: 0 /HPF (ref 0–4)
SP GR UR STRIP: 1.01 (ref 1–1.03)
SQUAMOUS #/AREA URNS HPF: 15 /HPF
URN SPEC COLLECT METH UR: ABNORMAL
UROBILINOGEN UR STRIP-ACNC: NEGATIVE EU/DL
WBC #/AREA URNS HPF: 2 /HPF (ref 0–5)
WBC CLUMPS URNS QL MICRO: NORMAL
YEAST URNS QL MICRO: NORMAL

## 2020-04-27 PROCEDURE — 99284 EMERGENCY DEPT VISIT MOD MDM: CPT | Mod: 25

## 2020-04-27 PROCEDURE — 81000 URINALYSIS NONAUTO W/SCOPE: CPT

## 2020-04-27 PROCEDURE — 96372 THER/PROPH/DIAG INJ SC/IM: CPT

## 2020-04-27 PROCEDURE — 63600175 PHARM REV CODE 636 W HCPCS: Performed by: NURSE PRACTITIONER

## 2020-04-27 RX ORDER — DICLOFENAC SODIUM 10 MG/G
2 GEL TOPICAL 4 TIMES DAILY
Qty: 100 G | Refills: 0 | OUTPATIENT
Start: 2020-04-27 | End: 2021-10-12

## 2020-04-27 RX ORDER — NAPROXEN 375 MG/1
375 TABLET ORAL 2 TIMES DAILY WITH MEALS
Qty: 60 TABLET | Refills: 0 | OUTPATIENT
Start: 2020-04-27 | End: 2021-10-12

## 2020-04-27 RX ORDER — METHOCARBAMOL 500 MG/1
1000 TABLET, FILM COATED ORAL 3 TIMES DAILY
Qty: 30 TABLET | Refills: 0 | Status: SHIPPED | OUTPATIENT
Start: 2020-04-27 | End: 2020-05-02

## 2020-04-27 RX ORDER — KETOROLAC TROMETHAMINE 30 MG/ML
30 INJECTION, SOLUTION INTRAMUSCULAR; INTRAVENOUS
Status: COMPLETED | OUTPATIENT
Start: 2020-04-27 | End: 2020-04-27

## 2020-04-27 RX ADMIN — KETOROLAC TROMETHAMINE 30 MG: 30 INJECTION, SOLUTION INTRAMUSCULAR; INTRAVENOUS at 05:04

## 2020-04-27 NOTE — ED NOTES
R groin pain x several days, Denies fevers, chills, SOB or injury. Pt AAOx4 and appropriate at this time. Respirations even and unlabored. No acute distress noted.

## 2020-04-27 NOTE — ED NOTES
Appearance: Pt awake, alert & oriented to person, place & time. Pt in no acute distress at present time. Pt is clean and well groomed with clothes appropriately fastened. Unable to tell if there is swelling to R groin due to pt large body habitus.  No redness.   Skin: Skin warm, dry & intact. Color consistent with ethnicity. Mucous membranes moist. No breakdown or brusing noted.   Musculoskeletal: Patient moving all extremities well, no obvious swelling or deformities noted.   Respiratory: Respirations spontaneous, even, and non-labored. Visible chest rise noted. Airway is open and patent. No accessory muscle use noted.   Neurologic: Sensation is intact. Speech is clear and appropriate. Eyes open spontaneously, behavior appropriate to situation, follows commands,  purposeful motor response noted.   Cardiac:  No Bilateral lower extremity edema. Cap refill is <3 seconds. Pt denies active chest pains, SOB, dizziness, blurred vision, weakness or fatigue at this time.   Abdomen: Pt denies active abd pains, cramping or discomfort, No N/V/D at this time.   : Pt reports no dysuria or hematuria.

## 2020-04-28 NOTE — ED PROVIDER NOTES
"Encounter Date: 4/27/2020       History     Chief Complaint   Patient presents with    Groin Pain     R groin/hip pain x several days, denies recent falls injury, fevers, chills or cough. States "i  my grandkids alot"      This is the urgent evaluation of a 64 year old female who reports right lateral hip pain that radiates to her groin and down her leg x 3 days.  She denies any injury/trauma but reports she picks up her grandkids a lot.  She denies any dysuria.  She also denies any help from OTC.        Review of patient's allergies indicates:   Allergen Reactions    Codeine Nausea And Vomiting     Past Medical History:   Diagnosis Date    Atrial fibrillation     Diabetes mellitus     Hypertension      Past Surgical History:   Procedure Laterality Date    HYSTERECTOMY       No family history on file.  Social History     Tobacco Use    Smoking status: Never Smoker   Substance Use Topics    Alcohol use: No    Drug use: No     Review of Systems   Constitutional: Negative for chills and fever.   Respiratory: Negative for cough and shortness of breath.    Cardiovascular: Negative for chest pain.   Gastrointestinal: Negative for abdominal pain.   Musculoskeletal: Positive for back pain. Negative for gait problem and neck pain.   All other systems reviewed and are negative.      Physical Exam     Initial Vitals [04/27/20 1632]   BP Pulse Resp Temp SpO2   (!) 150/84 100 20 98.8 °F (37.1 °C) 100 %      MAP       --         Physical Exam    Nursing note and vitals reviewed.  Constitutional: Vital signs are normal. She appears well-developed and well-nourished. She does not appear ill. No distress.   Neck: Neck supple.   Cardiovascular: Normal rate, regular rhythm and normal heart sounds.   Pulmonary/Chest: Effort normal and breath sounds normal. She has no decreased breath sounds. She has no wheezes.   Abdominal: Bowel sounds are normal. There is no tenderness. There is no rigidity, no rebound, no guarding, " no CVA tenderness, no tenderness at McBurney's point and negative Araiza's sign.       Musculoskeletal:        Lumbar back: She exhibits tenderness and spasm. She exhibits normal range of motion, no bony tenderness and no pain.        Back:    Neurological: She is alert and oriented to person, place, and time. GCS eye subscore is 4. GCS verbal subscore is 5. GCS motor subscore is 6.   Skin: Skin is warm, dry and intact.   Psychiatric: She has a normal mood and affect. Her behavior is normal.         ED Course   Procedures  Labs Reviewed   URINALYSIS, REFLEX TO URINE CULTURE - Abnormal; Notable for the following components:       Result Value    Glucose, UA 3+ (*)     All other components within normal limits    Narrative:     Preferred Collection Type->Urine, Clean Catch   URINALYSIS MICROSCOPIC    Narrative:     Preferred Collection Type->Urine, Clean Catch          Imaging Results    None          Medical Decision Making:   Differential Diagnosis:   Uti, strain/sprain, muscle spasm, neuropathic pain, UTI/pyelonephritis, nephrolithiasis    Clinical Tests:   Lab Tests: Ordered and Reviewed  ED Management:  UA negative, patient reports improvement in pain after toradol administration.  Will treat as groin/back strain with nsaids, muscle relaxers and voltaren.  Additional verbal discharge instructions were given and discussed with the patient, return precautions were given and the patient verbalized understanding.                                     Clinical Impression:       ICD-10-CM ICD-9-CM   1. Groin strain, right, initial encounter S76.211A 848.8         Disposition:   Disposition: Discharged  Condition: Stable     ED Disposition Condition    Discharge Stable        ED Prescriptions     Medication Sig Dispense Start Date End Date Auth. Provider    naproxen (NAPROSYN) 375 MG tablet Take 1 tablet (375 mg total) by mouth 2 (two) times daily with meals. 60 tablet 4/27/2020  MADHAV Soares    methocarbamoL  (ROBAXIN) 500 MG Tab Take 2 tablets (1,000 mg total) by mouth 3 (three) times daily. for 5 days 30 tablet 4/27/2020 5/2/2020 MADHAV Soares    diclofenac sodium (VOLTAREN) 1 % Gel Apply 2 g topically 4 (four) times daily. for 10 days 100 g 4/27/2020 5/7/2020 MADHAV Soares        Follow-up Information     Follow up With Specialties Details Why Contact Info    Paulie Pedersen MD Pediatrics Schedule an appointment as soon as possible for a visit   [address] [facility] [address]  [phone]                                       MADHAV Soares  04/27/20 3602

## 2021-06-03 ENCOUNTER — HOSPITAL ENCOUNTER (EMERGENCY)
Facility: OTHER | Age: 66
Discharge: HOME | End: 2021-06-03
Attending: EMERGENCY MEDICINE
Payer: MEDICAID

## 2021-06-03 VITALS
RESPIRATION RATE: 18 BRPM | TEMPERATURE: 98 F | WEIGHT: 200 LBS | HEIGHT: 62 IN | BODY MASS INDEX: 36.8 KG/M2 | DIASTOLIC BLOOD PRESSURE: 88 MMHG | HEART RATE: 80 BPM | OXYGEN SATURATION: 95 % | SYSTOLIC BLOOD PRESSURE: 150 MMHG

## 2021-06-03 DIAGNOSIS — L08.9 INFECTED SEBACEOUS CYST: Primary | ICD-10-CM

## 2021-06-03 DIAGNOSIS — L72.3 INFECTED SEBACEOUS CYST: Primary | ICD-10-CM

## 2021-06-03 LAB
HCV AB SERPL QL IA: NEGATIVE
HIV 1+2 AB+HIV1 P24 AG SERPL QL IA: NEGATIVE

## 2021-06-03 PROCEDURE — 25000003 PHARM REV CODE 250: Performed by: NURSE PRACTITIONER

## 2021-06-03 PROCEDURE — 99284 EMERGENCY DEPT VISIT MOD MDM: CPT | Mod: 25

## 2021-06-03 PROCEDURE — 86803 HEPATITIS C AB TEST: CPT | Performed by: EMERGENCY MEDICINE

## 2021-06-03 PROCEDURE — 10060 I&D ABSCESS SIMPLE/SINGLE: CPT

## 2021-06-03 PROCEDURE — 86703 HIV-1/HIV-2 1 RESULT ANTBDY: CPT | Performed by: EMERGENCY MEDICINE

## 2021-06-03 RX ORDER — TRAMADOL HYDROCHLORIDE 50 MG/1
50 TABLET ORAL EVERY 8 HOURS PRN
Qty: 9 TABLET | Refills: 0 | Status: SHIPPED | OUTPATIENT
Start: 2021-06-03 | End: 2021-06-06

## 2021-06-03 RX ORDER — TRAMADOL HYDROCHLORIDE 50 MG/1
50 TABLET ORAL
Status: COMPLETED | OUTPATIENT
Start: 2021-06-03 | End: 2021-06-03

## 2021-06-03 RX ORDER — LIDOCAINE HYDROCHLORIDE 10 MG/ML
10 INJECTION INFILTRATION; PERINEURAL
Status: COMPLETED | OUTPATIENT
Start: 2021-06-03 | End: 2021-06-03

## 2021-06-03 RX ORDER — SULFAMETHOXAZOLE AND TRIMETHOPRIM 800; 160 MG/1; MG/1
1 TABLET ORAL 2 TIMES DAILY
Qty: 14 TABLET | Refills: 0 | Status: SHIPPED | OUTPATIENT
Start: 2021-06-03 | End: 2021-06-10

## 2021-06-03 RX ORDER — SULFAMETHOXAZOLE AND TRIMETHOPRIM 800; 160 MG/1; MG/1
1 TABLET ORAL
Status: COMPLETED | OUTPATIENT
Start: 2021-06-03 | End: 2021-06-03

## 2021-06-03 RX ADMIN — TRAMADOL HYDROCHLORIDE 50 MG: 50 TABLET, COATED ORAL at 07:06

## 2021-06-03 RX ADMIN — SULFAMETHOXAZOLE AND TRIMETHOPRIM 1 TABLET: 800; 160 TABLET ORAL at 07:06

## 2021-06-03 RX ADMIN — LIDOCAINE HYDROCHLORIDE 10 ML: 10 INJECTION, SOLUTION INFILTRATION; PERINEURAL at 06:06

## 2021-10-12 ENCOUNTER — HOSPITAL ENCOUNTER (EMERGENCY)
Facility: OTHER | Age: 66
Discharge: HOME | End: 2021-10-12
Attending: EMERGENCY MEDICINE
Payer: MEDICAID

## 2021-10-12 VITALS
DIASTOLIC BLOOD PRESSURE: 76 MMHG | WEIGHT: 201.5 LBS | HEIGHT: 62 IN | BODY MASS INDEX: 37.08 KG/M2 | RESPIRATION RATE: 16 BRPM | SYSTOLIC BLOOD PRESSURE: 115 MMHG | TEMPERATURE: 99 F | HEART RATE: 65 BPM | OXYGEN SATURATION: 100 %

## 2021-10-12 DIAGNOSIS — R10.9 ABDOMINAL PAIN: ICD-10-CM

## 2021-10-12 DIAGNOSIS — K85.90 ACUTE PANCREATITIS, UNSPECIFIED COMPLICATION STATUS, UNSPECIFIED PANCREATITIS TYPE: Primary | ICD-10-CM

## 2021-10-12 LAB
ALBUMIN SERPL BCP-MCNC: 3.6 G/DL (ref 3.5–5.2)
ALLENS TEST: ABNORMAL
ALP SERPL-CCNC: 75 U/L (ref 55–135)
ALT SERPL W/O P-5'-P-CCNC: 26 U/L (ref 10–44)
ANION GAP SERPL CALC-SCNC: 11 MMOL/L (ref 8–16)
AST SERPL-CCNC: 22 U/L (ref 10–40)
B-OH-BUTYR BLD STRIP-SCNC: 0.1 MMOL/L (ref 0–0.5)
BACTERIA #/AREA URNS HPF: NORMAL /HPF
BASOPHILS # BLD AUTO: 0.04 K/UL (ref 0–0.2)
BASOPHILS NFR BLD: 0.6 % (ref 0–1.9)
BILIRUB SERPL-MCNC: 0.5 MG/DL (ref 0.1–1)
BILIRUB UR QL STRIP: NEGATIVE
BUN SERPL-MCNC: 15 MG/DL (ref 8–23)
CALCIUM SERPL-MCNC: 9.3 MG/DL (ref 8.7–10.5)
CHLORIDE SERPL-SCNC: 101 MMOL/L (ref 95–110)
CLARITY UR: CLEAR
CO2 SERPL-SCNC: 20 MMOL/L (ref 23–29)
COLOR UR: YELLOW
CREAT SERPL-MCNC: 1.1 MG/DL (ref 0.5–1.4)
CREAT SERPL-MCNC: 1.4 MG/DL (ref 0.5–1.4)
DELSYS: ABNORMAL
DIFFERENTIAL METHOD: NORMAL
EOSINOPHIL # BLD AUTO: 0.1 K/UL (ref 0–0.5)
EOSINOPHIL NFR BLD: 1.8 % (ref 0–8)
ERYTHROCYTE [DISTWIDTH] IN BLOOD BY AUTOMATED COUNT: 13.2 % (ref 11.5–14.5)
EST. GFR  (AFRICAN AMERICAN): 46 ML/MIN/1.73 M^2
EST. GFR  (NON AFRICAN AMERICAN): 40 ML/MIN/1.73 M^2
GLUCOSE SERPL-MCNC: 461 MG/DL (ref 70–110)
GLUCOSE UR QL STRIP: ABNORMAL
HCO3 UR-SCNC: 27.6 MMOL/L (ref 24–28)
HCT VFR BLD AUTO: 43.2 % (ref 37–48.5)
HGB BLD-MCNC: 14 G/DL (ref 12–16)
HGB UR QL STRIP: NEGATIVE
HYALINE CASTS #/AREA URNS LPF: 0 /LPF
IMM GRANULOCYTES # BLD AUTO: 0.03 K/UL (ref 0–0.04)
IMM GRANULOCYTES NFR BLD AUTO: 0.4 % (ref 0–0.5)
KETONES UR QL STRIP: NEGATIVE
LEUKOCYTE ESTERASE UR QL STRIP: NEGATIVE
LIPASE SERPL-CCNC: 150 U/L (ref 4–60)
LYMPHOCYTES # BLD AUTO: 2.8 K/UL (ref 1–4.8)
LYMPHOCYTES NFR BLD: 41.2 % (ref 18–48)
MCH RBC QN AUTO: 28.1 PG (ref 27–31)
MCHC RBC AUTO-ENTMCNC: 32.4 G/DL (ref 32–36)
MCV RBC AUTO: 87 FL (ref 82–98)
MICROSCOPIC COMMENT: NORMAL
MODE: ABNORMAL
MONOCYTES # BLD AUTO: 0.6 K/UL (ref 0.3–1)
MONOCYTES NFR BLD: 8.5 % (ref 4–15)
NEUTROPHILS # BLD AUTO: 3.2 K/UL (ref 1.8–7.7)
NEUTROPHILS NFR BLD: 47.5 % (ref 38–73)
NITRITE UR QL STRIP: NEGATIVE
NRBC BLD-RTO: 0 /100 WBC
PCO2 BLDA: 35.7 MMHG (ref 35–45)
PH SMN: 7.5 [PH] (ref 7.35–7.45)
PH UR STRIP: 6 [PH] (ref 5–8)
PLATELET # BLD AUTO: 260 K/UL (ref 150–450)
PMV BLD AUTO: 10.2 FL (ref 9.2–12.9)
PO2 BLDA: 27 MMHG (ref 40–60)
POC BE: 4 MMOL/L
POC SATURATED O2: 56 % (ref 95–100)
POC TCO2: 29 MMOL/L (ref 24–29)
POCT GLUCOSE: 254 MG/DL (ref 70–110)
POCT GLUCOSE: 305 MG/DL (ref 70–110)
POCT GLUCOSE: 446 MG/DL (ref 70–110)
POTASSIUM SERPL-SCNC: 5.4 MMOL/L (ref 3.5–5.1)
PROT SERPL-MCNC: 7.5 G/DL (ref 6–8.4)
PROT UR QL STRIP: NEGATIVE
RBC # BLD AUTO: 4.99 M/UL (ref 4–5.4)
RBC #/AREA URNS HPF: 0 /HPF (ref 0–4)
SAMPLE: ABNORMAL
SAMPLE: NORMAL
SITE: ABNORMAL
SODIUM SERPL-SCNC: 132 MMOL/L (ref 136–145)
SP GR UR STRIP: 1.01 (ref 1–1.03)
SQUAMOUS #/AREA URNS HPF: 0 /HPF
URN SPEC COLLECT METH UR: ABNORMAL
UROBILINOGEN UR STRIP-ACNC: NEGATIVE EU/DL
WBC # BLD AUTO: 6.7 K/UL (ref 3.9–12.7)
WBC #/AREA URNS HPF: 1 /HPF (ref 0–5)
YEAST URNS QL MICRO: NORMAL

## 2021-10-12 PROCEDURE — 96376 TX/PRO/DX INJ SAME DRUG ADON: CPT

## 2021-10-12 PROCEDURE — 93010 EKG 12-LEAD: ICD-10-PCS | Mod: ,,, | Performed by: INTERNAL MEDICINE

## 2021-10-12 PROCEDURE — 99285 EMERGENCY DEPT VISIT HI MDM: CPT | Mod: 25

## 2021-10-12 PROCEDURE — 82803 BLOOD GASES ANY COMBINATION: CPT

## 2021-10-12 PROCEDURE — 83690 ASSAY OF LIPASE: CPT | Performed by: NURSE PRACTITIONER

## 2021-10-12 PROCEDURE — 96375 TX/PRO/DX INJ NEW DRUG ADDON: CPT

## 2021-10-12 PROCEDURE — 93010 ELECTROCARDIOGRAM REPORT: CPT | Mod: ,,, | Performed by: INTERNAL MEDICINE

## 2021-10-12 PROCEDURE — 85025 COMPLETE CBC W/AUTO DIFF WBC: CPT | Performed by: NURSE PRACTITIONER

## 2021-10-12 PROCEDURE — 63600175 PHARM REV CODE 636 W HCPCS: Performed by: EMERGENCY MEDICINE

## 2021-10-12 PROCEDURE — 96361 HYDRATE IV INFUSION ADD-ON: CPT

## 2021-10-12 PROCEDURE — 82565 ASSAY OF CREATININE: CPT

## 2021-10-12 PROCEDURE — 25000003 PHARM REV CODE 250: Performed by: EMERGENCY MEDICINE

## 2021-10-12 PROCEDURE — 25500020 PHARM REV CODE 255: Performed by: EMERGENCY MEDICINE

## 2021-10-12 PROCEDURE — 96374 THER/PROPH/DIAG INJ IV PUSH: CPT

## 2021-10-12 PROCEDURE — 81000 URINALYSIS NONAUTO W/SCOPE: CPT | Performed by: NURSE PRACTITIONER

## 2021-10-12 PROCEDURE — 93005 ELECTROCARDIOGRAM TRACING: CPT

## 2021-10-12 PROCEDURE — 96372 THER/PROPH/DIAG INJ SC/IM: CPT | Mod: 59

## 2021-10-12 PROCEDURE — 82010 KETONE BODYS QUAN: CPT | Performed by: EMERGENCY MEDICINE

## 2021-10-12 PROCEDURE — 99900035 HC TECH TIME PER 15 MIN (STAT)

## 2021-10-12 PROCEDURE — 80053 COMPREHEN METABOLIC PANEL: CPT | Performed by: NURSE PRACTITIONER

## 2021-10-12 PROCEDURE — 82962 GLUCOSE BLOOD TEST: CPT

## 2021-10-12 RX ORDER — ONDANSETRON 2 MG/ML
4 INJECTION INTRAMUSCULAR; INTRAVENOUS
Status: COMPLETED | OUTPATIENT
Start: 2021-10-12 | End: 2021-10-12

## 2021-10-12 RX ORDER — PANTOPRAZOLE SODIUM 20 MG/1
40 TABLET, DELAYED RELEASE ORAL DAILY
Qty: 60 TABLET | Refills: 0 | Status: SHIPPED | OUTPATIENT
Start: 2021-10-12 | End: 2021-11-11

## 2021-10-12 RX ORDER — ONDANSETRON 4 MG/1
4 TABLET, ORALLY DISINTEGRATING ORAL
Status: COMPLETED | OUTPATIENT
Start: 2021-10-12 | End: 2021-10-12

## 2021-10-12 RX ORDER — PROMETHAZINE HYDROCHLORIDE 25 MG/1
25 TABLET ORAL EVERY 6 HOURS PRN
Qty: 25 TABLET | Refills: 0 | Status: SHIPPED | OUTPATIENT
Start: 2021-10-12

## 2021-10-12 RX ORDER — ONDANSETRON 4 MG/1
4 TABLET, ORALLY DISINTEGRATING ORAL EVERY 6 HOURS PRN
Qty: 20 TABLET | Refills: 0 | OUTPATIENT
Start: 2021-10-12 | End: 2021-12-21

## 2021-10-12 RX ORDER — HYDROCODONE BITARTRATE AND ACETAMINOPHEN 10; 325 MG/1; MG/1
1 TABLET ORAL
Status: COMPLETED | OUTPATIENT
Start: 2021-10-12 | End: 2021-10-12

## 2021-10-12 RX ORDER — TRAMADOL HYDROCHLORIDE 50 MG/1
50 TABLET ORAL EVERY 6 HOURS PRN
Qty: 12 TABLET | Refills: 0 | Status: SHIPPED | OUTPATIENT
Start: 2021-10-12 | End: 2021-10-15

## 2021-10-12 RX ORDER — MORPHINE SULFATE 4 MG/ML
4 INJECTION, SOLUTION INTRAMUSCULAR; INTRAVENOUS
Status: COMPLETED | OUTPATIENT
Start: 2021-10-12 | End: 2021-10-12

## 2021-10-12 RX ADMIN — MORPHINE SULFATE 4 MG: 4 INJECTION INTRAVENOUS at 01:10

## 2021-10-12 RX ADMIN — HYDROCODONE BITARTRATE AND ACETAMINOPHEN 1 TABLET: 10; 325 TABLET ORAL at 04:10

## 2021-10-12 RX ADMIN — ONDANSETRON 4 MG: 4 TABLET, ORALLY DISINTEGRATING ORAL at 04:10

## 2021-10-12 RX ADMIN — ONDANSETRON 4 MG: 2 INJECTION INTRAMUSCULAR; INTRAVENOUS at 12:10

## 2021-10-12 RX ADMIN — IOHEXOL 100 ML: 350 INJECTION, SOLUTION INTRAVENOUS at 03:10

## 2021-10-12 RX ADMIN — SODIUM CHLORIDE 500 ML: 0.9 INJECTION, SOLUTION INTRAVENOUS at 03:10

## 2021-10-12 RX ADMIN — SODIUM CHLORIDE 1000 ML: 0.9 INJECTION, SOLUTION INTRAVENOUS at 12:10

## 2021-10-12 RX ADMIN — INSULIN HUMAN 4 UNITS: 100 INJECTION, SOLUTION PARENTERAL at 03:10

## 2021-10-12 RX ADMIN — ONDANSETRON 4 MG: 2 INJECTION INTRAMUSCULAR; INTRAVENOUS at 04:10

## 2021-10-13 ENCOUNTER — PATIENT OUTREACH (OUTPATIENT)
Dept: EMERGENCY MEDICINE | Facility: OTHER | Age: 66
End: 2021-10-13

## 2021-10-13 ENCOUNTER — TELEPHONE (OUTPATIENT)
Dept: ENDOSCOPY | Facility: HOSPITAL | Age: 66
End: 2021-10-13

## 2021-10-13 ENCOUNTER — TELEPHONE (OUTPATIENT)
Dept: GASTROENTEROLOGY | Facility: CLINIC | Age: 66
End: 2021-10-13

## 2021-10-15 ENCOUNTER — PATIENT OUTREACH (OUTPATIENT)
Dept: EMERGENCY MEDICINE | Facility: OTHER | Age: 66
End: 2021-10-15

## 2021-12-20 ENCOUNTER — HOSPITAL ENCOUNTER (EMERGENCY)
Facility: OTHER | Age: 66
Discharge: HOME | End: 2021-12-21
Attending: EMERGENCY MEDICINE
Payer: MEDICAID

## 2021-12-20 DIAGNOSIS — R50.9 ACUTE FEBRILE ILLNESS: ICD-10-CM

## 2021-12-20 DIAGNOSIS — J18.9 PNEUMONIA OF RIGHT LOWER LOBE DUE TO INFECTIOUS ORGANISM: Primary | ICD-10-CM

## 2021-12-20 DIAGNOSIS — K86.1 CHRONIC PANCREATITIS, UNSPECIFIED PANCREATITIS TYPE: ICD-10-CM

## 2021-12-20 LAB
CTP QC/QA: YES
SARS-COV-2 RDRP RESP QL NAA+PROBE: NEGATIVE

## 2021-12-20 PROCEDURE — 63600175 PHARM REV CODE 636 W HCPCS: Performed by: EMERGENCY MEDICINE

## 2021-12-20 PROCEDURE — 96375 TX/PRO/DX INJ NEW DRUG ADDON: CPT

## 2021-12-20 PROCEDURE — U0002 COVID-19 LAB TEST NON-CDC: HCPCS | Performed by: EMERGENCY MEDICINE

## 2021-12-20 PROCEDURE — 99285 EMERGENCY DEPT VISIT HI MDM: CPT | Mod: 25

## 2021-12-20 PROCEDURE — 25000003 PHARM REV CODE 250: Performed by: EMERGENCY MEDICINE

## 2021-12-20 RX ORDER — ONDANSETRON 2 MG/ML
8 INJECTION INTRAMUSCULAR; INTRAVENOUS
Status: COMPLETED | OUTPATIENT
Start: 2021-12-20 | End: 2021-12-20

## 2021-12-20 RX ORDER — ACETAMINOPHEN 500 MG
1000 TABLET ORAL
Status: COMPLETED | OUTPATIENT
Start: 2021-12-20 | End: 2021-12-20

## 2021-12-20 RX ORDER — METOCLOPRAMIDE HYDROCHLORIDE 5 MG/ML
10 INJECTION INTRAMUSCULAR; INTRAVENOUS ONCE AS NEEDED
Status: COMPLETED | OUTPATIENT
Start: 2021-12-21 | End: 2021-12-20

## 2021-12-20 RX ORDER — HYDROMORPHONE HYDROCHLORIDE 1 MG/ML
0.5 INJECTION, SOLUTION INTRAMUSCULAR; INTRAVENOUS; SUBCUTANEOUS EVERY 30 MIN PRN
Status: DISCONTINUED | OUTPATIENT
Start: 2021-12-21 | End: 2021-12-21 | Stop reason: HOSPADM

## 2021-12-20 RX ADMIN — ONDANSETRON 8 MG: 2 INJECTION INTRAMUSCULAR; INTRAVENOUS at 11:12

## 2021-12-20 RX ADMIN — METOCLOPRAMIDE 10 MG: 5 INJECTION, SOLUTION INTRAMUSCULAR; INTRAVENOUS at 11:12

## 2021-12-20 RX ADMIN — ACETAMINOPHEN 1000 MG: 500 TABLET, FILM COATED ORAL at 11:12

## 2021-12-20 RX ADMIN — HYDROMORPHONE HYDROCHLORIDE 0.5 MG: 1 INJECTION, SOLUTION INTRAMUSCULAR; INTRAVENOUS; SUBCUTANEOUS at 11:12

## 2021-12-21 VITALS
BODY MASS INDEX: 36.8 KG/M2 | DIASTOLIC BLOOD PRESSURE: 78 MMHG | RESPIRATION RATE: 17 BRPM | WEIGHT: 200 LBS | SYSTOLIC BLOOD PRESSURE: 148 MMHG | HEIGHT: 62 IN | OXYGEN SATURATION: 93 % | TEMPERATURE: 100 F | HEART RATE: 93 BPM

## 2021-12-21 LAB
ALBUMIN SERPL BCP-MCNC: 3.2 G/DL (ref 3.5–5.2)
ALP SERPL-CCNC: 82 U/L (ref 55–135)
ALT SERPL W/O P-5'-P-CCNC: 28 U/L (ref 10–44)
ANION GAP SERPL CALC-SCNC: 12 MMOL/L (ref 8–16)
AST SERPL-CCNC: 18 U/L (ref 10–40)
BACTERIA #/AREA URNS HPF: ABNORMAL /HPF
BASOPHILS # BLD AUTO: 0.03 K/UL (ref 0–0.2)
BASOPHILS NFR BLD: 0.5 % (ref 0–1.9)
BILIRUB SERPL-MCNC: 0.4 MG/DL (ref 0.1–1)
BILIRUB UR QL STRIP: NEGATIVE
BUN SERPL-MCNC: 11 MG/DL (ref 8–23)
CALCIUM SERPL-MCNC: 8.2 MG/DL (ref 8.7–10.5)
CHLORIDE SERPL-SCNC: 106 MMOL/L (ref 95–110)
CLARITY UR: CLEAR
CO2 SERPL-SCNC: 21 MMOL/L (ref 23–29)
COLOR UR: YELLOW
CREAT SERPL-MCNC: 1.1 MG/DL (ref 0.5–1.4)
DIFFERENTIAL METHOD: ABNORMAL
EOSINOPHIL # BLD AUTO: 0.4 K/UL (ref 0–0.5)
EOSINOPHIL NFR BLD: 5.6 % (ref 0–8)
ERYTHROCYTE [DISTWIDTH] IN BLOOD BY AUTOMATED COUNT: 13.4 % (ref 11.5–14.5)
EST. GFR  (AFRICAN AMERICAN): >60 ML/MIN/1.73 M^2
EST. GFR  (NON AFRICAN AMERICAN): 53 ML/MIN/1.73 M^2
GLUCOSE SERPL-MCNC: 168 MG/DL (ref 70–110)
GLUCOSE UR QL STRIP: ABNORMAL
HCT VFR BLD AUTO: 41.7 % (ref 37–48.5)
HGB BLD-MCNC: 13.2 G/DL (ref 12–16)
HGB UR QL STRIP: NEGATIVE
IMM GRANULOCYTES # BLD AUTO: 0.02 K/UL (ref 0–0.04)
IMM GRANULOCYTES NFR BLD AUTO: 0.3 % (ref 0–0.5)
KETONES UR QL STRIP: NEGATIVE
LEUKOCYTE ESTERASE UR QL STRIP: ABNORMAL
LIPASE SERPL-CCNC: 158 U/L (ref 4–60)
LYMPHOCYTES # BLD AUTO: 1.6 K/UL (ref 1–4.8)
LYMPHOCYTES NFR BLD: 24.4 % (ref 18–48)
MCH RBC QN AUTO: 28 PG (ref 27–31)
MCHC RBC AUTO-ENTMCNC: 31.7 G/DL (ref 32–36)
MCV RBC AUTO: 89 FL (ref 82–98)
MICROSCOPIC COMMENT: ABNORMAL
MONOCYTES # BLD AUTO: 0.7 K/UL (ref 0.3–1)
MONOCYTES NFR BLD: 10 % (ref 4–15)
NEUTROPHILS # BLD AUTO: 3.9 K/UL (ref 1.8–7.7)
NEUTROPHILS NFR BLD: 59.2 % (ref 38–73)
NITRITE UR QL STRIP: NEGATIVE
NRBC BLD-RTO: 0 /100 WBC
PH UR STRIP: 6 [PH] (ref 5–8)
PLATELET # BLD AUTO: 178 K/UL (ref 150–450)
PMV BLD AUTO: 9.9 FL (ref 9.2–12.9)
POTASSIUM SERPL-SCNC: 4.3 MMOL/L (ref 3.5–5.1)
PROT SERPL-MCNC: 6.7 G/DL (ref 6–8.4)
PROT UR QL STRIP: ABNORMAL
RBC # BLD AUTO: 4.71 M/UL (ref 4–5.4)
SODIUM SERPL-SCNC: 139 MMOL/L (ref 136–145)
SP GR UR STRIP: >=1.03 (ref 1–1.03)
URN SPEC COLLECT METH UR: ABNORMAL
UROBILINOGEN UR STRIP-ACNC: NEGATIVE EU/DL
WBC # BLD AUTO: 6.63 K/UL (ref 3.9–12.7)
WBC #/AREA URNS HPF: 11 /HPF (ref 0–5)

## 2021-12-21 PROCEDURE — 25000242 PHARM REV CODE 250 ALT 637 W/ HCPCS: Performed by: EMERGENCY MEDICINE

## 2021-12-21 PROCEDURE — 94799 UNLISTED PULMONARY SVC/PX: CPT

## 2021-12-21 PROCEDURE — 85025 COMPLETE CBC W/AUTO DIFF WBC: CPT | Performed by: EMERGENCY MEDICINE

## 2021-12-21 PROCEDURE — 25000003 PHARM REV CODE 250: Performed by: EMERGENCY MEDICINE

## 2021-12-21 PROCEDURE — 87040 BLOOD CULTURE FOR BACTERIA: CPT | Performed by: EMERGENCY MEDICINE

## 2021-12-21 PROCEDURE — 80053 COMPREHEN METABOLIC PANEL: CPT | Performed by: EMERGENCY MEDICINE

## 2021-12-21 PROCEDURE — 96367 TX/PROPH/DG ADDL SEQ IV INF: CPT

## 2021-12-21 PROCEDURE — 63600175 PHARM REV CODE 636 W HCPCS: Performed by: EMERGENCY MEDICINE

## 2021-12-21 PROCEDURE — 63700000 PHARM REV CODE 250 ALT 637 W/O HCPCS: Performed by: EMERGENCY MEDICINE

## 2021-12-21 PROCEDURE — 94640 AIRWAY INHALATION TREATMENT: CPT

## 2021-12-21 PROCEDURE — 96365 THER/PROPH/DIAG IV INF INIT: CPT

## 2021-12-21 PROCEDURE — 83690 ASSAY OF LIPASE: CPT | Performed by: EMERGENCY MEDICINE

## 2021-12-21 PROCEDURE — 87086 URINE CULTURE/COLONY COUNT: CPT | Performed by: EMERGENCY MEDICINE

## 2021-12-21 PROCEDURE — 96366 THER/PROPH/DIAG IV INF ADDON: CPT

## 2021-12-21 PROCEDURE — 81000 URINALYSIS NONAUTO W/SCOPE: CPT | Performed by: EMERGENCY MEDICINE

## 2021-12-21 RX ORDER — LEVOFLOXACIN 5 MG/ML
750 INJECTION, SOLUTION INTRAVENOUS
Status: COMPLETED | OUTPATIENT
Start: 2021-12-21 | End: 2021-12-21

## 2021-12-21 RX ORDER — ALBUTEROL SULFATE 90 UG/1
1-2 AEROSOL, METERED RESPIRATORY (INHALATION) EVERY 6 HOURS PRN
Qty: 6.7 G | Refills: 0 | Status: SHIPPED | OUTPATIENT
Start: 2021-12-21 | End: 2022-01-20

## 2021-12-21 RX ORDER — ONDANSETRON 4 MG/1
4 TABLET, ORALLY DISINTEGRATING ORAL EVERY 6 HOURS PRN
Qty: 12 TABLET | Refills: 0 | Status: SHIPPED | OUTPATIENT
Start: 2021-12-21

## 2021-12-21 RX ORDER — AZITHROMYCIN 250 MG/1
500 TABLET, FILM COATED ORAL
Status: COMPLETED | OUTPATIENT
Start: 2021-12-21 | End: 2021-12-21

## 2021-12-21 RX ORDER — BENZONATATE 100 MG/1
100 CAPSULE ORAL 3 TIMES DAILY PRN
Qty: 20 CAPSULE | Refills: 0 | Status: SHIPPED | OUTPATIENT
Start: 2021-12-21 | End: 2021-12-31

## 2021-12-21 RX ORDER — IPRATROPIUM BROMIDE AND ALBUTEROL SULFATE 2.5; .5 MG/3ML; MG/3ML
3 SOLUTION RESPIRATORY (INHALATION) EVERY 5 MIN PRN
Status: DISCONTINUED | OUTPATIENT
Start: 2021-12-21 | End: 2021-12-21 | Stop reason: HOSPADM

## 2021-12-21 RX ORDER — NAPROXEN 500 MG/1
500 TABLET ORAL 2 TIMES DAILY PRN
Qty: 60 TABLET | Refills: 0 | OUTPATIENT
Start: 2021-12-21 | End: 2023-12-07

## 2021-12-21 RX ORDER — AZITHROMYCIN 250 MG/1
250 TABLET, FILM COATED ORAL DAILY
Qty: 4 TABLET | Refills: 0 | Status: SHIPPED | OUTPATIENT
Start: 2021-12-22 | End: 2021-12-26

## 2021-12-21 RX ADMIN — LEVOFLOXACIN 750 MG: 750 INJECTION, SOLUTION INTRAVENOUS at 03:12

## 2021-12-21 RX ADMIN — AZITHROMYCIN MONOHYDRATE 500 MG: 250 TABLET ORAL at 03:12

## 2021-12-21 RX ADMIN — PIPERACILLIN AND TAZOBACTAM 4.5 G: 4; .5 INJECTION, POWDER, LYOPHILIZED, FOR SOLUTION INTRAVENOUS; PARENTERAL at 01:12

## 2021-12-21 RX ADMIN — IPRATROPIUM BROMIDE AND ALBUTEROL SULFATE 3 ML: .5; 3 SOLUTION RESPIRATORY (INHALATION) at 03:12

## 2021-12-22 LAB — BACTERIA UR CULT: NORMAL

## 2021-12-26 LAB
BACTERIA BLD CULT: NORMAL
BACTERIA BLD CULT: NORMAL

## 2022-01-13 LAB — CRC RECOMMENDATION EXT: NORMAL

## 2023-12-07 ENCOUNTER — HOSPITAL ENCOUNTER (EMERGENCY)
Facility: OTHER | Age: 68
Discharge: HOME | End: 2023-12-07
Attending: EMERGENCY MEDICINE
Payer: MEDICARE

## 2023-12-07 VITALS
HEART RATE: 90 BPM | OXYGEN SATURATION: 97 % | HEIGHT: 62 IN | DIASTOLIC BLOOD PRESSURE: 66 MMHG | TEMPERATURE: 98 F | BODY MASS INDEX: 36.8 KG/M2 | WEIGHT: 200 LBS | SYSTOLIC BLOOD PRESSURE: 128 MMHG | RESPIRATION RATE: 20 BRPM

## 2023-12-07 DIAGNOSIS — M54.50 LOW BACK PAIN: Primary | ICD-10-CM

## 2023-12-07 DIAGNOSIS — M54.2 NECK PAIN: ICD-10-CM

## 2023-12-07 PROCEDURE — 25000003 PHARM REV CODE 250: Performed by: EMERGENCY MEDICINE

## 2023-12-07 PROCEDURE — 99283 EMERGENCY DEPT VISIT LOW MDM: CPT

## 2023-12-07 RX ORDER — HYDROCODONE BITARTRATE AND ACETAMINOPHEN 5; 325 MG/1; MG/1
1 TABLET ORAL
Status: COMPLETED | OUTPATIENT
Start: 2023-12-07 | End: 2023-12-07

## 2023-12-07 RX ORDER — METHOCARBAMOL 750 MG/1
1500 TABLET, FILM COATED ORAL 3 TIMES DAILY PRN
Qty: 30 TABLET | Refills: 0 | Status: SHIPPED | OUTPATIENT
Start: 2023-12-07 | End: 2023-12-12

## 2023-12-07 RX ORDER — METHOCARBAMOL 750 MG/1
1500 TABLET, FILM COATED ORAL
Status: COMPLETED | OUTPATIENT
Start: 2023-12-07 | End: 2023-12-07

## 2023-12-07 RX ORDER — NAPROXEN 500 MG/1
500 TABLET ORAL 2 TIMES DAILY PRN
Qty: 28 TABLET | Refills: 0 | Status: SHIPPED | OUTPATIENT
Start: 2023-12-07 | End: 2023-12-18

## 2023-12-07 RX ORDER — LIDOCAINE 50 MG/G
PATCH TOPICAL
Qty: 15 PATCH | Refills: 1 | Status: SHIPPED | OUTPATIENT
Start: 2023-12-07

## 2023-12-07 RX ORDER — DICLOFENAC SODIUM 10 MG/G
2 GEL TOPICAL 3 TIMES DAILY PRN
Qty: 100 G | Refills: 0 | Status: SHIPPED | OUTPATIENT
Start: 2023-12-07

## 2023-12-07 RX ORDER — NAPROXEN 500 MG/1
500 TABLET ORAL
Status: COMPLETED | OUTPATIENT
Start: 2023-12-07 | End: 2023-12-07

## 2023-12-07 RX ADMIN — HYDROCODONE BITARTRATE AND ACETAMINOPHEN 1 TABLET: 5; 325 TABLET ORAL at 04:12

## 2023-12-07 RX ADMIN — NAPROXEN 500 MG: 500 TABLET ORAL at 04:12

## 2023-12-07 RX ADMIN — METHOCARBAMOL 1500 MG: 750 TABLET ORAL at 04:12

## 2023-12-07 NOTE — FIRST PROVIDER EVALUATION
Emergency Department TeleTriage Encounter Note      CHIEF COMPLAINT    Chief Complaint   Patient presents with    Fall     Slip and fall down the steps on Saturday   Pt states she slipped and fell forwards attempting to catch the railing   C/O of lower back pain and the posterior portion of the head.    (-)LOC or neck pain at this time  (-)blood thinners  (-)numbness or tingling of the extremities       VITAL SIGNS   Initial Vitals [12/07/23 1450]   BP Pulse Resp Temp SpO2   128/66 90 17 98.4 °F (36.9 °C) 97 %      MAP       --            ALLERGIES    Review of patient's allergies indicates:   Allergen Reactions    Codeine Nausea And Vomiting       PROVIDER TRIAGE NOTE  This is a teletriage evaluation of a 68 y.o. female presenting to the ED complaining of fall. Patient reports fall down flight of stairs 4 days ago. She has had neck pain, low back pain, and headache since then. She reports hitting her head but denies LOC.    Patient is alert and oriented. She speaks in complete sentences. She is sitting upright in the chair in no distress.     Initial orders will be placed and care will be transferred to an alternate provider when patient is roomed for a full evaluation. Any additional orders and the final disposition will be determined by that provider.         ORDERS  Labs Reviewed - No data to display    ED Orders (720h ago, onward)      Start Ordered     Status Ordering Provider    12/07/23 1505 12/07/23 1504  X-Ray Cervical Spine AP And Lateral  1 time imaging         Ordered BORA NIELSON.    12/07/23 1504 12/07/23 1503  X-Ray Lumbar Spine Ap And Lateral  1 time imaging         Ordered BORA NIELSON    12/07/23 1504 12/07/23 1503  CT Head Without Contrast  1 time imaging         Ordered BORA NIELSON              Virtual Visit Note: The provider triage portion of this emergency department evaluation and documentation was performed via [employer], a HIPAA-compliant telemedicine application, in concert  with a tele-presenter in the room. A face to face patient evaluation with one of my colleagues will occur once the patient is placed in an emergency department room.      DISCLAIMER: This note was prepared with [email or web address] voice recognition transcription software. Garbled syntax, mangled pronouns, and other bizarre constructions may be attributed to that software system.

## 2023-12-07 NOTE — ED TRIAGE NOTES
"Fell forward down some steps on Saturday. States she has been having pain to back of head and lower back since fall. Taking home meds and Ibuprofen without relief - "seems to be getting worse". Pain increases with movement. Mild nausea controlled with home meds. Denies fever, urinary symptoms, or h/o chronic back pain.   "

## 2023-12-07 NOTE — DISCHARGE INSTRUCTIONS
Thank you for letting us take care of you today! It was nice meeting you, and I hope you feel better soon.     Call your primary care doctor to make the first available appointment.     Keep all your medical appointments.     Take your regular medication as prescribed. Contact your primary care provider before running out of medication, or for any problems obtaining them.    Do not drive or operate heavy machinery while taking opioid or muscle relaxing medications. Examples include norco, percocet, xanax, valium, flexeril.     Overuse or long term use of pain and sedating medication may lead to addiction, dependence, organ failure, family and work problems, legal problems, accidental overdose, and death.    If you do not have health insurance, you probably can afford it:  Call 1-347.836.4079 ([facility] hotline) or go to www.[employer].[place].gov    Your evaluation in the ER does not suggest any emergent or life threatening medical condition requiring admission or immediate intervention beyond that provided in the ER.   However, the signs of a serious problem sometimes take more time to appear.     Do not hesitate to return to the ER if any of the following occur:    Weakness, dizziness, fainting, or loss of consciousness   Fever of 100.4ºF (38ºC) or higher  Any worse symptoms  Any new or concerning symptoms    You were seen for your back pain.  At this time, it does not appear your pain is from a dangerous cause.     You have injured the muscles (strain) or ligaments (sprain) around the spine. Muscle spasm is often present and adds to the pain.     Do your activities as tolerated. Bedrest will probably make your back pain worse.  Take NSAIDs regularly over the next 1-2 days. Do not exceed the maximum recommended daily dose.     Take all your medications exactly as prescribed.  Call your primary care provider to make the first available appointment.     A back sprain or muscle strain usually gets better in 2-3 weeks. If pain  continues and does not respond to medical treatment after 3-4 weeks contact your primary care doctor or return to the ER.     Do not drive or operate heavy machinery while taking valium, lortab, percocet or other sedating medications. Prolonged or overuse of drugs prescribed for pain, sedation or muscle relaxation may lead to addiction, dependence, family problems, legal problems, organ failure, death.      RETURN TO THE ER if any of the following occur:    Pain becomes worse or spreads into your arms or legs   Weakness, numbness or pain in one or both arms or legs   Loss of bowel or bladder control   Numbness in the groin area   Difficulty walking  New or worse pain: if it feels different, becomes more severe, lasts longer, or begins to spread into your shoulder, arm, neck, jaw or back   Shortness of breath or increased pain with breathing   Cough with dark colored sputum (phlegm) or blood   Weakness, dizziness, fainting, falling out, or loss of consciousness   Fever of 100.4ºF (38ºC) or higher  Any new or concerning symptoms

## 2023-12-07 NOTE — ED PROVIDER NOTES
"  Source of History:  Medical record, patient      Chief complaint:  Per triage note: "Fall (Slip and fall down the steps on Saturday /Pt states she slipped and fell forwards attempting to catch the railing /C/O of lower back pain and the posterior portion of the head.//(-)LOC or neck pain at this time/(-)blood thinners/(-)numbness or tingling of the extremities)  "    HPI:    Patient presents with lower back and buttocks pain following mechanical fall down seven steps of stairs 5 days ago. She reports hitting her head with momentary mild confusion but denies loss of consciousness. Patient reports accompanying posterior head pain without numbness, paresthesia, or vision changes. She notes moderate relief with analgesic use of Tylenol along with ibuprofen and ice packs. This is the extent of the patient's complaints at this time.     ROS: As per HPI and below:   General: No fever.   HENT: No facial pain.   Eyes: No eye pain.   Cardiovascular: No chest pain.   Respiratory: No dyspnea.   GI: No abdominal pain. No n/v. No stool incontinence/retention.   : No urine incontinence/retention.  Neuro: No saddle anesthesia. No focal strength or sensation deficits.   Musculoskeletal: Notes back pain. No swelling.         Review of patient's allergies indicates:   Allergen Reactions    Codeine Nausea And Vomiting       PMH:  As per HPI and below:  Past Medical History:   Diagnosis Date    Atrial fibrillation     Diabetes mellitus     Hypertension        Past Surgical History:   Procedure Laterality Date    HYSTERECTOMY         Social History     Tobacco Use    Smoking status: Never   Substance Use Topics    Alcohol use: No    Drug use: No       Physical Exam:      Nursing note and vitals reviewed.  /66 (BP Location: Left arm, Patient Position: Sitting)   Pulse 90   Temp 98.4 °F (36.9 °C) (Oral)   Resp 17   Ht 5' 2" (1.575 m)   Wt 90.7 kg (200 lb)   SpO2 97%   BMI 36.58 kg/m²   Nursing note and vitals " reviewed.  Constitutional: AAOx3. Well-developed and well-nourished. No distress.  HENT:   Mouth/Throat: Oropharynx is clear and moist.  Eyes: Pupils are equal, round, and reactive to light. No discharge. Anicteric.  Neck: Normal range of motion. Neck supple. No midline tenderness, stepoffs, or deformities.  Cardiovascular: Normal rate.  Pulmonary/Chest: Effort normal.  Abdominal: Soft. Bowel sounds normal. No distension and no mass. There is no tenderness. There is no rebound, no guarding.  Musculoskeletal: Normal range of motion. No midline spinal tenderness. No stepoffs or deformities. Lumbar and sacral paraspinal tenderness and spasm. Midline sacral tenderness. Bilateral buttocks tenderness.   Neurological: Alert and oriented to person, place, and time. No gross cranial nerve deficit. Coordination normal. No UE/LE light  touch or strength deficits. Able to do deep knee bend, stand on heels and toes. Normal gait.   Skin: Skin is warm and dry.   Ext: 2+ radial pulses  Psychiatric: Behavior is normal. Judgment normal.        Medical Decision Making / Independent Interpretations / External Records Reviewed:      MDM:  Annie Matthews is a 68 y.o. female with diabetes, hypertension who presents with CC low back pain after mechanical slip and fall down 7 steps 5 days ago. No bowel/bladder incontinence/retention, no weakness, ambulating steadily. Denies fevers, chills, nightsweats, LE weakness or paresthesias, saddle anesthesia.     My initial differential diagnosis included spinal fracture particularly coccyx fracture, ruptured AAA, epidural abscess, cauda equina syndrome, fracture, disc herniation, sciatica, and musculoskeletal back pain.     Exam with no stepoff/deformity to lower lumbar spine, neuro intact, no abdominal mass, gait intact, no signs of infection and appears nontoxic.  At present no evidence of cauda equina, acute neurologic compromise, or AAA. By history and physical exam, I believe the patient  has muscle spasm with lumbar strain.    Patient reported significant improvement after analgesics and muscle relaxants given here.  I will provide anti-inflammatories and muscle relaxants.        I decided to obtain the patient's medical records. I reviewed patient's prior external notes / results: specialist note   .     Additional Medical Decision Making: Prescription drug management    Medications - No data to display           Diagnostic Impression:    1. Neck pain          No future appointments.        ---  I, Quinton Lara, scribed for, and in the presence of, Dr. William. I performed the scribed service and the documentation accurately describes the services I performed. I attest to the accuracy of the note.     Physician Attestation for Scribe:   I, Jamil William MD, reviewed documentation as scribed in my presence, which is both accurate and complete.      Jamil William MD  12/08/23 0229

## 2023-12-14 ENCOUNTER — CLINICAL SUPPORT (OUTPATIENT)
Dept: REHABILITATION | Facility: HOSPITAL | Age: 68
End: 2023-12-14
Attending: EMERGENCY MEDICINE
Payer: MEDICARE

## 2023-12-14 ENCOUNTER — TELEPHONE (OUTPATIENT)
Dept: ORTHOPEDICS | Facility: CLINIC | Age: 68
End: 2023-12-14
Payer: MEDICARE

## 2023-12-14 DIAGNOSIS — M54.50 LUMBAR PAIN: Primary | ICD-10-CM

## 2023-12-14 DIAGNOSIS — M54.50 LOW BACK PAIN: ICD-10-CM

## 2023-12-14 PROCEDURE — 97161 PT EVAL LOW COMPLEX 20 MIN: CPT

## 2023-12-14 PROCEDURE — 97110 THERAPEUTIC EXERCISES: CPT

## 2023-12-14 NOTE — PLAN OF CARE
[facility]   Physical Therapy Initial Evaluation      Name: Annie Matthews  Clinic Number: [id number]    Therapy Diagnosis:   Encounter Diagnoses   Name Primary?    Low back pain     Lumbar pain Yes        Physician: Jamil William MD    Physician Orders: PT Eval and Treat   Medical Diagnosis from Referral: M54.50 (ICD-10-CM) - Low back pain   Evaluation Date: 12/14/2023  Authorization Period Expiration: 12/6/24  Plan of Care Expiration: 1/26/24  Progress Note Due: 1/14/24  Visit # / Visits authorized: 1/ 1   FOTO: 1/ 3    Precautions: Standard and Diabetes     Time In: 1:46 pm   Time Out: 2:25 pm   Total Billable Time: 39 minutes    Subjective     Date of onset: 12/5/23    History of current condition - Annie reports: that she fell down stairs on 12/5/23. Pt stated that she was going down stairs and slipped and grabbed rail but rail was slippery and she flipped all the day down. Pt stated that she went to ER on 12/7/23 and was given medicine and did xrays on her back.  Pt stated that a year ago she broke (L) ankle and (L) ankle gets weak and thinks she may have gone down with (L) LE. Pt stated that she is experiencing pain across her lower back. Pt denies pain radiating pain into (B) LE. Pt stated that she did not have back pain prior to fall. Pt stated that (L) foot has always been numb (prior to fall). Pt stated that she has an orthopedic apt at the end of January to have (L) ankle checked.     Falls: slipped and broke (L) ankle about a year ago and fall on 12/5/23 (see subjective)     Imaging: see imaging section    Prior Therapy: yes for (L) ankle   Social History: Pt stated that she lives alone. Pt stated that she has 4 steps with handrail to enter her [facility].   Occupation: Pt stated that she is retired.   Prior Level of Function: independent   Current Level of Function: report of pain/difficulty performing aggravating factors listed below    Pain:  Current 5/10, worst 10/10,  best 5/10   Location: bilateral lumbar  Description: Aching  Aggravating Factors: moving around, stooping/bending, standing for a period of time, lying supine  Easing Factors: pain medicine, cream    Patients goals: alleviate pain     Medical History:   Past Medical History:   Diagnosis Date    Atrial fibrillation     Diabetes mellitus     Hypertension        Surgical History:   Annie Matthews  has a past surgical history that includes Hysterectomy.    Medications:   Annie has a current medication list which includes the following prescription(s): albuterol, aspirin, atorvastatin, carvedilol, diclofenac sodium, glipizide, lidocaine, lisinopril, metformin, naproxen, ondansetron, pantoprazole, and promethazine.    Allergies:   Review of patient's allergies indicates:   Allergen Reactions    Codeine Nausea And Vomiting        Objective      Lumbar AROM: Pain/Dysfunction with Movement:   Flexion 25 degrees  Pain   Extension 5 degrees Pain   Right side bending 20 degrees pain   Left side bending 20 degrees pain     Hip Right  Left  Pain/Dysfunction with Movement    AROM MMT AROM MMT NT= not tested    Flexion WFL 4+/5 WFL 4+/5    Extension NT NT NT NT Pt performed poor bridge against gravity and reported pain   Abduction NT 4/5 NT 4/5 MMT tested seated EOM   External rotation WFL 4+/5 WFL 4+/5       Knee Right  Left  Pain/Dysfunction with Movement    AROM MMT AROM MMT    Flexion WFL 4+/5 WFL 4+/5    Extension WFL 5/5 WFL 5/5      Ankle Right  Left  Pain/Dysfunction with Movement    AROM MMT AROM MMT    Plantarflexion WFL 4/5 WFL 4/5    Dorsiflexion WFL 5/5 WFL 5/5      90/90 Hamstring Test: deferred due to increased pain/difficulty lying supine     AILYN: deferred due to increased pain/difficulty lying supine    Posture: flexed trunk in sitting    Gait: antalgic, without AD     Intake Outcome Measure for FOTO Lumbar Survey    Therapist reviewed FOTO scores for Annie Matthews on 12/14/2023.   FOTO report -  "see Media section or FOTO account episode details.    Intake Score: 40       Treatment     Total Treatment time (time-based codes) separate from Evaluation: 12 minutes     Annie received the treatments listed below:      therapeutic exercises to develop strength, ROM, and flexibility for 8 minutes including:  Seated hamstring stretch 2x30"   Ball squeezes 5" x10   Seated clams RTB 2x10     neuromuscular re-education activities to improve: stabilization for 4 minutes. The following activities were included:  TA bracing 5" x10       Patient Education and Home Exercises     Education provided: pt verbalized understanding of all education provided   - HEP - stop performing particular therex/activity if particular therex/activity causes/increases pain   (L) shoulder   - Pt reported experiencing pain and swelling in (L) shoulder since fall - educated pt to schedule apt with orthopedics to have (L) shoulder examined  - Pt reported that she has been experiencing a headache since she fell - educated pt to contact PCP for further evaluation/recommendation    Written Home Exercises Provided: yes. Exercises were reviewed and Annie was able to demonstrate them prior to the end of the session.  Annie demonstrated good  understanding of the education provided. See EMR under Patient Instructions for exercises provided during therapy sessions.    Assessment     Annie is a 68 y.o. female referred to outpatient Physical Therapy with a medical diagnosis of Low back pain. Patient presents with report of pain when she performed lumbar AROM in all planes measured above, decreased LE MMT scores, antalgic gait, and decreased functional mobility. Pt will benefit from skilled PT.     Patient prognosis is Good.   Patient will benefit from skilled outpatient Physical Therapy to address the deficits stated above and in the chart below, provide patient /family education, and to maximize patientt's level of independence.     Plan of care " discussed with patient: Yes  Patient's spiritual, cultural and educational needs considered and patient is agreeable to the plan of care and goals as stated below:     Anticipated Barriers for therapy: pain    Medical Necessity is demonstrated by the following  History  Co-morbidities and personal factors that may impact the plan of care [] LOW: no personal factors / co-morbidities  [x] MODERATE: 1-2 personal factors / co-morbidities  [] HIGH: 3+ personal factors / co-morbidities    Moderate / High Support Documentation:   Co-morbidities affecting plan of care: HTN, diabetes    Personal Factors:   no deficits     Examination  Body Structures and Functions, activity limitations and participation restrictions that may impact the plan of care [] LOW: addressing 1-2 elements  [] MODERATE: 3+ elements  [x] HIGH: 4+ elements (please support below)    Moderate / High Support Documentation: ROM, strength, gait, posture     Clinical Presentation [x] LOW: stable  [] MODERATE: Evolving  [] HIGH: Unstable     Decision Making/ Complexity Score: low       Goals:  Short Term Goals: 1 week  Pt will be compliant with HEP to supplement PT with decreasing pain and improving functional mobility    Long Term Goals: 6 weeks   Pt will improve FOTO score to 58 in order to demo improved functional mobility  Pt will improve LE MMT scores by at least 1/2 grade where deficits noted in order to improve strength for functional tasks  Pt will perform lumbar AROM in all planes without report of pain in order to be able to perform ADLs with less difficulty   Pt will report ability to perform ADLs without lumbar pain in order to promote return to PLOF   Plan     Plan of care Certification: 12/14/2023 to 1/26/24.    Outpatient Physical Therapy 1 times weekly for 6 weeks to include the following interventions: Gait Training, Manual Therapy, Moist Heat/ Ice, Neuromuscular Re-ed, Patient Education, Self Care, Therapeutic Activities, Therapeutic  Exercise, and IASTM, dry needling, and modalities prn .     Stefani Avilez PT        Physician's Signature: _________________________________________ Date: ________________

## 2023-12-15 ENCOUNTER — TELEPHONE (OUTPATIENT)
Dept: ORTHOPEDICS | Facility: CLINIC | Age: 68
End: 2023-12-15
Payer: MEDICARE

## 2023-12-15 NOTE — TELEPHONE ENCOUNTER
Returned patient's call and patient c/o left shoulder pain for several weeks, no injury. Appt scheduled and patient acknowledged.    ----- Message from Awilda Burnham sent at 12/15/2023 11:39 AM CST -----  Regarding: Pt had a fall on left shoulder  Contact: @[phone]  Regarding:Pt had a fall on left shoulder    Contact: Annie    Type: Call back to advise if needed.    Who Called: Annie    Would the patient rather a call back or a response via [facility]? Phone call    Best Call Back Number:[phone]  '

## 2023-12-18 ENCOUNTER — OFFICE VISIT (OUTPATIENT)
Dept: ORTHOPEDICS | Facility: CLINIC | Age: 68
End: 2023-12-18
Payer: MEDICARE

## 2023-12-18 ENCOUNTER — HOSPITAL ENCOUNTER (OUTPATIENT)
Dept: RADIOLOGY | Facility: HOSPITAL | Age: 68
Discharge: HOME | End: 2023-12-18
Attending: PHYSICIAN ASSISTANT
Payer: MEDICARE

## 2023-12-18 VITALS — HEIGHT: 62 IN | BODY MASS INDEX: 38.46 KG/M2 | WEIGHT: 209 LBS

## 2023-12-18 DIAGNOSIS — S46.912A SHOULDER STRAIN, LEFT, INITIAL ENCOUNTER: Primary | ICD-10-CM

## 2023-12-18 DIAGNOSIS — G89.29 CHRONIC LEFT SHOULDER PAIN: ICD-10-CM

## 2023-12-18 DIAGNOSIS — M25.512 CHRONIC LEFT SHOULDER PAIN: ICD-10-CM

## 2023-12-18 PROCEDURE — 1101F PT FALLS ASSESS-DOCD LE1/YR: CPT | Mod: CPTII,S$GLB,, | Performed by: PHYSICIAN ASSISTANT

## 2023-12-18 PROCEDURE — 3008F BODY MASS INDEX DOCD: CPT | Mod: CPTII,S$GLB,, | Performed by: PHYSICIAN ASSISTANT

## 2023-12-18 PROCEDURE — 99203 PR OFFICE/OUTPT VISIT, NEW, LEVL III, 30-44 MIN: ICD-10-PCS | Mod: S$GLB,,, | Performed by: PHYSICIAN ASSISTANT

## 2023-12-18 PROCEDURE — 99203 OFFICE O/P NEW LOW 30 MIN: CPT | Mod: S$GLB,,, | Performed by: PHYSICIAN ASSISTANT

## 2023-12-18 PROCEDURE — 1125F AMNT PAIN NOTED PAIN PRSNT: CPT | Mod: CPTII,S$GLB,, | Performed by: PHYSICIAN ASSISTANT

## 2023-12-18 PROCEDURE — 3288F FALL RISK ASSESSMENT DOCD: CPT | Mod: CPTII,S$GLB,, | Performed by: PHYSICIAN ASSISTANT

## 2023-12-18 PROCEDURE — 1125F PR PAIN SEVERITY QUANTIFIED, PAIN PRESENT: ICD-10-PCS | Mod: CPTII,S$GLB,, | Performed by: PHYSICIAN ASSISTANT

## 2023-12-18 PROCEDURE — 1160F PR REVIEW ALL MEDS BY PRESCRIBER/CLIN PHARMACIST DOCUMENTED: ICD-10-PCS | Mod: CPTII,S$GLB,, | Performed by: PHYSICIAN ASSISTANT

## 2023-12-18 PROCEDURE — 3008F PR BODY MASS INDEX (BMI) DOCUMENTED: ICD-10-PCS | Mod: CPTII,S$GLB,, | Performed by: PHYSICIAN ASSISTANT

## 2023-12-18 PROCEDURE — 73030 X-RAY EXAM OF SHOULDER: CPT | Mod: TC,LT

## 2023-12-18 PROCEDURE — 1159F MED LIST DOCD IN RCRD: CPT | Mod: CPTII,S$GLB,, | Performed by: PHYSICIAN ASSISTANT

## 2023-12-18 PROCEDURE — 1160F RVW MEDS BY RX/DR IN RCRD: CPT | Mod: CPTII,S$GLB,, | Performed by: PHYSICIAN ASSISTANT

## 2023-12-18 PROCEDURE — 99999 PR PBB SHADOW E&M-EST. PATIENT-LVL III: CPT | Mod: PBBFAC,,, | Performed by: PHYSICIAN ASSISTANT

## 2023-12-18 PROCEDURE — 99999 PR PBB SHADOW E&M-EST. PATIENT-LVL III: ICD-10-PCS | Mod: PBBFAC,,, | Performed by: PHYSICIAN ASSISTANT

## 2023-12-18 PROCEDURE — 73030 XR SHOULDER TRAUMA 3 VIEW LEFT: ICD-10-PCS | Mod: 26,LT,, | Performed by: RADIOLOGY

## 2023-12-18 PROCEDURE — 1159F PR MEDICATION LIST DOCUMENTED IN MEDICAL RECORD: ICD-10-PCS | Mod: CPTII,S$GLB,, | Performed by: PHYSICIAN ASSISTANT

## 2023-12-18 PROCEDURE — 1101F PR PT FALLS ASSESS DOC 0-1 FALLS W/OUT INJ PAST YR: ICD-10-PCS | Mod: CPTII,S$GLB,, | Performed by: PHYSICIAN ASSISTANT

## 2023-12-18 PROCEDURE — 73030 X-RAY EXAM OF SHOULDER: CPT | Mod: 26,LT,, | Performed by: RADIOLOGY

## 2023-12-18 PROCEDURE — 3288F PR FALLS RISK ASSESSMENT DOCUMENTED: ICD-10-PCS | Mod: CPTII,S$GLB,, | Performed by: PHYSICIAN ASSISTANT

## 2023-12-18 RX ORDER — METHOCARBAMOL 750 MG/1
750 TABLET, FILM COATED ORAL 3 TIMES DAILY PRN
Qty: 40 TABLET | Refills: 0 | Status: SHIPPED | OUTPATIENT
Start: 2023-12-18 | End: 2023-12-28

## 2023-12-18 RX ORDER — IBUPROFEN 800 MG/1
800 TABLET ORAL 3 TIMES DAILY PRN
Qty: 30 TABLET | Refills: 0 | Status: SHIPPED | OUTPATIENT
Start: 2023-12-18

## 2023-12-18 NOTE — PROGRESS NOTES
SUBJECTIVE:     Chief Complaint & History of Present Illness:  Annie Matthews is a 68 y.o. year old female who presents today with constant left shoulder pain that started after a fall about one week ago.  She is Right hand dominant.  The pain is located in the  lateral and  upper arm aspect of the shoulder.  The pain is described as achy.  It is aggravated by activity- lifting, reaching.  She has difficulty sleeping.   Previous treatments include rest, robaxin, ibuprofen which have provided adequate relief.  There is not a history of previous injury or surgery to the shoulder.      Review of patient's allergies indicates:   Allergen Reactions    Codeine Nausea And Vomiting         Current Outpatient Medications   Medication Sig Dispense Refill    aspirin (ECOTRIN) 81 MG EC tablet Take 81 mg by mouth once daily.      atorvastatin (LIPITOR) 10 MG tablet Take 10 mg by mouth once daily.      carvedilol (COREG) 12.5 MG tablet Take 12.5 mg by mouth 2 (two) times daily with meals.      diclofenac sodium (VOLTAREN) 1 % Gel Apply 2 g topically 3 (three) times daily as needed (muscle spasm pain). Apply 2 grams to affected area 3 times daily as needed 100 g 0    glipiZIDE (GLUCOTROL) 10 MG tablet Take 10 mg by mouth 2 (two) times daily before meals.      LIDOcaine (LIDODERM) 5 % Apply to affected area. Use as directed. May use 4% lidocaine patch as alternative. 15 patch 1    lisinopril (PRINIVIL,ZESTRIL) 40 MG tablet Take 40 mg by mouth once daily.      metformin (GLUCOPHAGE) 500 MG tablet Take 500 mg by mouth 2 (two) times daily with meals.      naproxen (NAPROSYN) 500 MG tablet Take 1 tablet (500 mg total) by mouth 2 (two) times daily as needed (pain). 28 tablet 0    ondansetron (ZOFRAN-ODT) 4 MG TbDL Take 1 tablet (4 mg total) by mouth every 6 (six) hours as needed (nausea or vomiting). 12 tablet 0    promethazine (PHENERGAN) 25 MG tablet Take 1 tablet (25 mg total) by mouth every 6 (six) hours as needed for  Nausea. 25 tablet 0    albuterol (PROVENTIL/VENTOLIN HFA) 90 mcg/actuation inhaler Inhale 1-2 puffs into the lungs every 6 (six) hours as needed for Wheezing or Shortness of Breath (Please dispense with spacer). 6.7 g 0    pantoprazole (PROTONIX) 20 MG tablet Take 2 tablets (40 mg total) by mouth once daily. 60 tablet 0     No current facility-administered medications for this visit.       Past Medical History:   Diagnosis Date    Atrial fibrillation     Diabetes mellitus     Hypertension        Past Surgical History:   Procedure Laterality Date    HYSTERECTOMY         Review of Systems:  ROS:  Constitutional: no fever or chills  Eyes: no visual changes  ENT: no nasal congestion or sore throat  Respiratory: no cough or shortness of breath  Musculoskeletal: no arthralgias or myalgias      OBJECTIVE:     PHYSICAL EXAM:    General: Weight: 94.8 kg (209 lb) Body mass index is 38.22 kg/m².  Patient is alert, awake and oriented to time, place and person. Mood and affect are appropriate.  Patient does not appear to be in any distress, denies any constitutional symptoms and appears stated age.   HEENT: Pupils are equal and round, sclera are not injected. External examination of ears and nose reveals no abnormalities. Cranial nerves II-X are grossly intact  Neck: examination demonstrates painless  active range of motion. Spurling's sign is negative  Skin: no rashes, abrasions or open wounds on the affected extremity   Resp: No respiratory distress or audible wheezing   Psych:  normal mood and behavior  CV: 2+  pulses, all extremities warm and well perfused   Left Shoulder   Skin intact  No effusion  Tenderness: globally  Range of motion is painful   ROM: forward flexion 160, external rotation 50/50, internal rotation L1  Shoulder Strength: biceps 5/5, triceps 5/5, abduction 5/5, adduction 5/5  positive for impingement sign, sensory exam normal, and motor exam normal  Special Tests:    Crossbody test: negative    Neer's  positive  Hawkin's positive    Aria's positive  Drop arm positive  IMAGING:  X-rays of the left shoulder, personally reviewed by me, demonstrate mild degenerative changes.  No fracture or dislocation.     ASSESSMENT     1. Shoulder strain, left, initial encounter        PLAN:     Discussed with the patient at length all the different treatment options available for her left shoulder including anti-inflammatories, acetaminophen, rest, ice, Physical therapy, occasional cortisone injections for temporary relief, and shoulder arthroscopy    - Recommend continue rest, activity modification  - Ibuprofen 800 mg and robaxin refilled  - She started PT for her back- will refer for shoulder  - She declined CSI today  - Follow up if symptoms worsen or fail to improve

## 2024-05-08 ENCOUNTER — OFFICE VISIT (OUTPATIENT)
Dept: ORTHOPEDICS | Facility: CLINIC | Age: 69
End: 2024-05-08
Payer: MEDICARE

## 2024-05-08 DIAGNOSIS — S46.912A SHOULDER STRAIN, LEFT, INITIAL ENCOUNTER: Primary | ICD-10-CM

## 2024-05-08 DIAGNOSIS — M75.42 IMPINGEMENT SYNDROME OF LEFT SHOULDER: ICD-10-CM

## 2024-05-08 PROCEDURE — 99999 PR PBB SHADOW E&M-EST. PATIENT-LVL II: CPT | Mod: PBBFAC,,, | Performed by: PHYSICIAN ASSISTANT

## 2024-05-08 PROCEDURE — 99499 UNLISTED E&M SERVICE: CPT | Mod: S$GLB,,, | Performed by: PHYSICIAN ASSISTANT

## 2024-05-08 PROCEDURE — 20610 DRAIN/INJ JOINT/BURSA W/O US: CPT | Mod: LT,S$GLB,, | Performed by: PHYSICIAN ASSISTANT

## 2024-05-08 RX ORDER — TRIAMCINOLONE ACETONIDE 40 MG/ML
40 INJECTION, SUSPENSION INTRA-ARTICULAR; INTRAMUSCULAR
Status: DISCONTINUED | OUTPATIENT
Start: 2024-05-08 | End: 2024-05-08 | Stop reason: HOSPADM

## 2024-05-08 RX ORDER — LIDOCAINE HYDROCHLORIDE 10 MG/ML
2 INJECTION INFILTRATION; PERINEURAL
Status: DISCONTINUED | OUTPATIENT
Start: 2024-05-08 | End: 2024-05-08 | Stop reason: HOSPADM

## 2024-05-08 RX ADMIN — LIDOCAINE HYDROCHLORIDE 2 ML: 10 INJECTION INFILTRATION; PERINEURAL at 03:05

## 2024-05-08 RX ADMIN — TRIAMCINOLONE ACETONIDE 40 MG: 40 INJECTION, SUSPENSION INTRA-ARTICULAR; INTRAMUSCULAR at 03:05

## 2024-05-08 NOTE — PROCEDURES
Large Joint Aspiration/Injection: L subacromial bursa    Date/Time: 5/8/2024 3:15 PM    Performed by: Saundra Rouse PA-C  Authorized by: Saundra Rouse PA-C    Consent Done?:  Yes (Verbal)  Indications:  Pain  Timeout: prior to procedure the correct patient, procedure, and site was verified    Prep: patient was prepped and draped in usual sterile fashion    Local anesthetic:  Topical anesthetic    Details:  Needle Size:  22 G  Approach:  Posterior  Location:  Shoulder  Site:  L subacromial bursa  Medications:  40 mg triamcinolone acetonide 40 mg/mL; 2 mL LIDOcaine HCL 10 mg/ml (1%) 10 mg/mL (1 %)  Patient tolerance:  Patient tolerated the procedure well with no immediate complications

## 2024-09-30 ENCOUNTER — HOSPITAL ENCOUNTER (OUTPATIENT)
Dept: RADIOLOGY | Facility: OTHER | Age: 69
Discharge: HOME | End: 2024-09-30
Attending: INTERNAL MEDICINE
Payer: MEDICARE

## 2024-09-30 DIAGNOSIS — R11.0 NAUSEA: ICD-10-CM

## 2024-09-30 PROCEDURE — 76700 US EXAM ABDOM COMPLETE: CPT | Mod: 26,,, | Performed by: RADIOLOGY

## 2024-09-30 PROCEDURE — 76700 US EXAM ABDOM COMPLETE: CPT | Mod: TC

## 2024-10-25 ENCOUNTER — HOSPITAL ENCOUNTER (OUTPATIENT)
Dept: RADIOLOGY | Facility: OTHER | Age: 69
Discharge: HOME | End: 2024-10-25
Attending: INTERNAL MEDICINE
Payer: MEDICARE

## 2024-10-25 DIAGNOSIS — K76.0 FATTY LIVER: ICD-10-CM

## 2024-10-25 DIAGNOSIS — R11.0 NAUSEA: ICD-10-CM

## 2024-10-25 PROCEDURE — 76981 USE PARENCHYMA: CPT | Mod: TC

## 2024-10-25 PROCEDURE — 76981 USE PARENCHYMA: CPT | Mod: 26,,, | Performed by: RADIOLOGY

## 2025-02-01 ENCOUNTER — HOSPITAL ENCOUNTER (EMERGENCY)
Facility: OTHER | Age: 70
Discharge: HOME | End: 2025-02-01
Attending: EMERGENCY MEDICINE
Payer: MEDICARE

## 2025-02-01 VITALS
SYSTOLIC BLOOD PRESSURE: 168 MMHG | HEART RATE: 68 BPM | WEIGHT: 200 LBS | RESPIRATION RATE: 18 BRPM | TEMPERATURE: 98 F | BODY MASS INDEX: 36.57 KG/M2 | OXYGEN SATURATION: 97 % | DIASTOLIC BLOOD PRESSURE: 71 MMHG

## 2025-02-01 DIAGNOSIS — R10.10 PAIN OF UPPER ABDOMEN: Primary | ICD-10-CM

## 2025-02-01 DIAGNOSIS — R11.10 VOMITING, UNSPECIFIED VOMITING TYPE, UNSPECIFIED WHETHER NAUSEA PRESENT: ICD-10-CM

## 2025-02-01 LAB
ALBUMIN SERPL BCP-MCNC: 3.2 G/DL (ref 3.5–5.2)
ALP SERPL-CCNC: 94 U/L (ref 40–150)
ALT SERPL W/O P-5'-P-CCNC: 21 U/L (ref 10–44)
ANION GAP SERPL CALC-SCNC: 10 MMOL/L (ref 8–16)
AST SERPL-CCNC: 16 U/L (ref 10–40)
BACTERIA #/AREA URNS HPF: ABNORMAL /HPF
BASOPHILS # BLD AUTO: 0.02 K/UL (ref 0–0.2)
BASOPHILS NFR BLD: 0.2 % (ref 0–1.9)
BILIRUB SERPL-MCNC: 0.5 MG/DL (ref 0.1–1)
BILIRUB UR QL STRIP: NEGATIVE
BUN SERPL-MCNC: 15 MG/DL (ref 8–23)
CALCIUM SERPL-MCNC: 8.9 MG/DL (ref 8.7–10.5)
CHLORIDE SERPL-SCNC: 104 MMOL/L (ref 95–110)
CLARITY UR: CLEAR
CO2 SERPL-SCNC: 21 MMOL/L (ref 23–29)
COLOR UR: YELLOW
CREAT SERPL-MCNC: 1.3 MG/DL (ref 0.5–1.4)
DIFFERENTIAL METHOD BLD: ABNORMAL
EOSINOPHIL # BLD AUTO: 0.1 K/UL (ref 0–0.5)
EOSINOPHIL NFR BLD: 0.9 % (ref 0–8)
ERYTHROCYTE [DISTWIDTH] IN BLOOD BY AUTOMATED COUNT: 13.2 % (ref 11.5–14.5)
EST. GFR  (NO RACE VARIABLE): 45 ML/MIN/1.73 M^2
GLUCOSE SERPL-MCNC: 419 MG/DL (ref 70–110)
GLUCOSE UR QL STRIP: ABNORMAL
HCT VFR BLD AUTO: 41.9 % (ref 37–48.5)
HCV AB SERPL QL IA: NEGATIVE
HGB BLD-MCNC: 13.1 G/DL (ref 12–16)
HGB UR QL STRIP: ABNORMAL
HIV 1+2 AB+HIV1 P24 AG SERPL QL IA: NEGATIVE
IMM GRANULOCYTES # BLD AUTO: 0.03 K/UL (ref 0–0.04)
IMM GRANULOCYTES NFR BLD AUTO: 0.3 % (ref 0–0.5)
KETONES UR QL STRIP: ABNORMAL
LEUKOCYTE ESTERASE UR QL STRIP: ABNORMAL
LIPASE SERPL-CCNC: 33 U/L (ref 4–60)
LYMPHOCYTES # BLD AUTO: 1.7 K/UL (ref 1–4.8)
LYMPHOCYTES NFR BLD: 18.8 % (ref 18–48)
MCH RBC QN AUTO: 27.3 PG (ref 27–31)
MCHC RBC AUTO-ENTMCNC: 31.3 G/DL (ref 32–36)
MCV RBC AUTO: 87 FL (ref 82–98)
MICROSCOPIC COMMENT: ABNORMAL
MONOCYTES # BLD AUTO: 0.8 K/UL (ref 0.3–1)
MONOCYTES NFR BLD: 9.1 % (ref 4–15)
NEUTROPHILS # BLD AUTO: 6.4 K/UL (ref 1.8–7.7)
NEUTROPHILS NFR BLD: 70.7 % (ref 38–73)
NITRITE UR QL STRIP: NEGATIVE
NRBC BLD-RTO: 0 /100 WBC
PH UR STRIP: 6 [PH] (ref 5–8)
PLATELET # BLD AUTO: 200 K/UL (ref 150–450)
PMV BLD AUTO: 10.7 FL (ref 9.2–12.9)
POCT GLUCOSE: 261 MG/DL (ref 70–110)
POCT GLUCOSE: 309 MG/DL (ref 70–110)
POTASSIUM SERPL-SCNC: 4.7 MMOL/L (ref 3.5–5.1)
PROT SERPL-MCNC: 7.2 G/DL (ref 6–8.4)
PROT UR QL STRIP: ABNORMAL
RBC # BLD AUTO: 4.8 M/UL (ref 4–5.4)
RBC #/AREA URNS HPF: 3 /HPF (ref 0–4)
SODIUM SERPL-SCNC: 135 MMOL/L (ref 136–145)
SP GR UR STRIP: >1.03 (ref 1–1.03)
SQUAMOUS #/AREA URNS HPF: 1 /HPF
URN SPEC COLLECT METH UR: ABNORMAL
UROBILINOGEN UR STRIP-ACNC: NEGATIVE EU/DL
WBC # BLD AUTO: 9.03 K/UL (ref 3.9–12.7)
WBC #/AREA URNS HPF: 22 /HPF (ref 0–5)
WBC CLUMPS URNS QL MICRO: ABNORMAL
YEAST URNS QL MICRO: ABNORMAL

## 2025-02-01 PROCEDURE — 96375 TX/PRO/DX INJ NEW DRUG ADDON: CPT

## 2025-02-01 PROCEDURE — 86803 HEPATITIS C AB TEST: CPT | Performed by: EMERGENCY MEDICINE

## 2025-02-01 PROCEDURE — 80053 COMPREHEN METABOLIC PANEL: CPT | Performed by: EMERGENCY MEDICINE

## 2025-02-01 PROCEDURE — 85025 COMPLETE CBC W/AUTO DIFF WBC: CPT | Performed by: EMERGENCY MEDICINE

## 2025-02-01 PROCEDURE — 25000003 PHARM REV CODE 250: Performed by: EMERGENCY MEDICINE

## 2025-02-01 PROCEDURE — 25500020 PHARM REV CODE 255: Performed by: EMERGENCY MEDICINE

## 2025-02-01 PROCEDURE — 87389 HIV-1 AG W/HIV-1&-2 AB AG IA: CPT | Performed by: EMERGENCY MEDICINE

## 2025-02-01 PROCEDURE — 83690 ASSAY OF LIPASE: CPT | Performed by: EMERGENCY MEDICINE

## 2025-02-01 PROCEDURE — 82962 GLUCOSE BLOOD TEST: CPT

## 2025-02-01 PROCEDURE — 96361 HYDRATE IV INFUSION ADD-ON: CPT

## 2025-02-01 PROCEDURE — 63600175 PHARM REV CODE 636 W HCPCS: Performed by: EMERGENCY MEDICINE

## 2025-02-01 PROCEDURE — 81000 URINALYSIS NONAUTO W/SCOPE: CPT | Performed by: EMERGENCY MEDICINE

## 2025-02-01 PROCEDURE — 99285 EMERGENCY DEPT VISIT HI MDM: CPT | Mod: 25

## 2025-02-01 PROCEDURE — 96376 TX/PRO/DX INJ SAME DRUG ADON: CPT

## 2025-02-01 PROCEDURE — 96374 THER/PROPH/DIAG INJ IV PUSH: CPT

## 2025-02-01 RX ORDER — ONDANSETRON HYDROCHLORIDE 2 MG/ML
4 INJECTION, SOLUTION INTRAVENOUS
Status: COMPLETED | OUTPATIENT
Start: 2025-02-01 | End: 2025-02-01

## 2025-02-01 RX ORDER — HYDROMORPHONE HYDROCHLORIDE 1 MG/ML
1 INJECTION, SOLUTION INTRAMUSCULAR; INTRAVENOUS; SUBCUTANEOUS
Status: COMPLETED | OUTPATIENT
Start: 2025-02-01 | End: 2025-02-01

## 2025-02-01 RX ORDER — MORPHINE SULFATE 4 MG/ML
4 INJECTION, SOLUTION INTRAMUSCULAR; INTRAVENOUS
Status: COMPLETED | OUTPATIENT
Start: 2025-02-01 | End: 2025-02-01

## 2025-02-01 RX ORDER — DICYCLOMINE HYDROCHLORIDE 20 MG/1
20 TABLET ORAL 3 TIMES DAILY PRN
Qty: 20 TABLET | Refills: 0 | Status: ON HOLD | OUTPATIENT
Start: 2025-02-01 | End: 2025-02-10 | Stop reason: HOSPADM

## 2025-02-01 RX ORDER — SODIUM CHLORIDE 9 MG/ML
500 INJECTION, SOLUTION INTRAVENOUS
Status: COMPLETED | OUTPATIENT
Start: 2025-02-01 | End: 2025-02-01

## 2025-02-01 RX ORDER — ONDANSETRON 4 MG/1
4 TABLET, ORALLY DISINTEGRATING ORAL EVERY 8 HOURS PRN
Qty: 12 TABLET | Refills: 0 | Status: SHIPPED | OUTPATIENT
Start: 2025-02-01

## 2025-02-01 RX ORDER — ACETAMINOPHEN 500 MG
1000 TABLET ORAL
Status: COMPLETED | OUTPATIENT
Start: 2025-02-01 | End: 2025-02-01

## 2025-02-01 RX ADMIN — ONDANSETRON 4 MG: 2 INJECTION INTRAMUSCULAR; INTRAVENOUS at 12:02

## 2025-02-01 RX ADMIN — ONDANSETRON 4 MG: 2 INJECTION INTRAMUSCULAR; INTRAVENOUS at 07:02

## 2025-02-01 RX ADMIN — SODIUM CHLORIDE 500 ML: 9 INJECTION, SOLUTION INTRAVENOUS at 10:02

## 2025-02-01 RX ADMIN — HYDROMORPHONE HYDROCHLORIDE 1 MG: 1 INJECTION, SOLUTION INTRAMUSCULAR; INTRAVENOUS; SUBCUTANEOUS at 02:02

## 2025-02-01 RX ADMIN — MORPHINE SULFATE 4 MG: 4 INJECTION, SOLUTION INTRAMUSCULAR; INTRAVENOUS at 07:02

## 2025-02-01 RX ADMIN — ONDANSETRON 4 MG: 2 INJECTION INTRAMUSCULAR; INTRAVENOUS at 04:02

## 2025-02-01 RX ADMIN — ACETAMINOPHEN 1000 MG: 500 TABLET, FILM COATED ORAL at 08:02

## 2025-02-01 RX ADMIN — SODIUM CHLORIDE 500 ML: 9 INJECTION, SOLUTION INTRAVENOUS at 01:02

## 2025-02-01 RX ADMIN — IOHEXOL 100 ML: 350 INJECTION, SOLUTION INTRAVENOUS at 09:02

## 2025-02-01 NOTE — ED PROVIDER NOTES
Encounter Date: 2/1/2025    SCRIBE #1 NOTE: I, Nicolas Bright, am scribing for, and in the presence of,  Cynthia Cohen MD. I have scribed the following portions of the note - Other sections scribed: HPI, ROS, PE.       History     Chief Complaint   Patient presents with    Emesis    Abdominal Pain     Generalized lower abd pain radiating to R flank and N/V x1 week. Denies  symptoms. T 100.4 in triage.      Time seen by physician: 7:34 AM    This is a 69 y.o. female with PMHx of diabetes, HTN, and HLD who presents with complaint of worsening abdominal pain for 1 week. She describe the pain as a generalized pain that radiates to her left flank/back and is exacerbated with food and water. She reports having one episode of diarrhea during this week, and constant nausea and vomiting throughout the week. She denies any blood in her vomit or stools. She states she has tried using pepto bismol and tylenol, but has not experienced any relief in her symptoms. She endorses having a hysterectomy done in the past. She denies, dysuria, fever, cough, or congestion.     Patient denies any other complaints at this time.      The history is provided by the patient.     Review of patient's allergies indicates:   Allergen Reactions    Codeine Nausea And Vomiting     Past Medical History:   Diagnosis Date    Atrial fibrillation     Diabetes mellitus     Hypertension      Past Surgical History:   Procedure Laterality Date    HYSTERECTOMY       No family history on file.  Social History     Tobacco Use    Smoking status: Never   Substance Use Topics    Alcohol use: No    Drug use: No     Review of Systems   Constitutional:  Negative for chills and fever.   HENT:  Negative for congestion and rhinorrhea.    Respiratory:  Negative for cough, chest tightness and shortness of breath.    Cardiovascular:  Negative for chest pain and palpitations.   Gastrointestinal:  Positive for abdominal pain, diarrhea, nausea and vomiting.   Genitourinary:   Positive for flank pain. Negative for dysuria.   Musculoskeletal:  Negative for back pain and neck pain.   Skin:  Negative for color change and wound.   Neurological:  Negative for dizziness and headaches.       Physical Exam     Initial Vitals [02/01/25 0706]   BP Pulse Resp Temp SpO2   (!) 177/109 98 16 (!) 100.4 °F (38 °C) 97 %      MAP       --         Physical Exam    Nursing note and vitals reviewed.  Constitutional: She appears well-developed and well-nourished.   HENT:   Head: Normocephalic and atraumatic.   Eyes: Conjunctivae are normal.   Neck: Neck supple.   Normal range of motion.  Cardiovascular:  Normal rate, regular rhythm and normal heart sounds.           Pulmonary/Chest: Breath sounds normal. No respiratory distress. She has no wheezes. She has no rales.   Abdominal: Abdomen is soft. Bowel sounds are normal. She exhibits no distension. There is abdominal tenderness in the right upper quadrant, epigastric area and left upper quadrant. There is no rebound.   Musculoskeletal:         General: Normal range of motion.      Cervical back: Normal range of motion and neck supple.     Neurological: She is alert and oriented to person, place, and time.   Ambulatory with steady gait.     Skin: Skin is warm and dry. Capillary refill takes less than 2 seconds.         ED Course   Procedures  Labs Reviewed   CBC W/ AUTO DIFFERENTIAL - Abnormal       Result Value    WBC 9.03      RBC 4.80      Hemoglobin 13.1      Hematocrit 41.9      MCV 87      MCH 27.3      MCHC 31.3 (*)     RDW 13.2      Platelets 200      MPV 10.7      Immature Granulocytes 0.3      Gran # (ANC) 6.4      Immature Grans (Abs) 0.03      Lymph # 1.7      Mono # 0.8      Eos # 0.1      Baso # 0.02      nRBC 0      Gran % 70.7      Lymph % 18.8      Mono % 9.1      Eosinophil % 0.9      Basophil % 0.2      Differential Method Automated     URINALYSIS - Abnormal    Specimen UA Urine, Clean Catch      Color, UA Yellow      Appearance, UA Clear       pH, UA 6.0      Specific Gravity, UA >1.030 (*)     Protein, UA Trace (*)     Glucose, UA 4+ (*)     Ketones, UA Trace (*)     Bilirubin (UA) Negative      Occult Blood UA Trace (*)     Nitrite, UA Negative      Urobilinogen, UA Negative      Leukocytes, UA 1+ (*)    COMPREHENSIVE METABOLIC PANEL - Abnormal    Sodium 135 (*)     Potassium 4.7      Chloride 104      CO2 21 (*)     Glucose 419 (*)     BUN 15      Creatinine 1.3      Calcium 8.9      Total Protein 7.2      Albumin 3.2 (*)     Total Bilirubin 0.5      Alkaline Phosphatase 94      AST 16      ALT 21      eGFR 45 (*)     Anion Gap 10      Narrative:     Release to patient->Immediate   URINALYSIS MICROSCOPIC - Abnormal    RBC, UA 3      WBC, UA 22 (*)     WBC Clumps, UA Few (*)     Bacteria Rare      Yeast, UA Rare (*)     Squam Epithel, UA 1      Microscopic Comment SEE COMMENT     POCT GLUCOSE - Abnormal    POCT Glucose 309 (*)    POCT GLUCOSE - Abnormal    POCT Glucose 261 (*)    HIV 1 / 2 ANTIBODY    HIV 1/2 Ag/Ab Negative      Narrative:     Release to patient->Immediate   HEPATITIS C ANTIBODY    Hepatitis C Ab Negative      Narrative:     Release to patient->Immediate   LIPASE    Lipase 33      Narrative:     Release to patient->Immediate   POCT GLUCOSE MONITORING CONTINUOUS          Imaging Results              CT Abdomen Pelvis With IV Contrast NO Oral Contrast (Final result)  Result time 02/01/25 10:05:19      Final result by Lana Piña MD (02/01/25 10:05:19)                   Impression:      1. No acute intra-abdominal abnormality.  2. Diverticulosis without evidence of diverticulitis.      Electronically signed by: Lana Piña  Date:    02/01/2025  Time:    10:05               Narrative:    EXAMINATION:  CT ABDOMEN PELVIS WITH IV CONTRAST    CLINICAL HISTORY:  Nausea/vomiting;Abdominal pain, acute, nonlocalized;    TECHNIQUE:  Low dose axial images, sagittal and coronal reformations were obtained from the lung bases to the pubic  symphysis following the IV administration of 100 mL of Omnipaque 350 .  Oral contrast was not given.    COMPARISON:  10/12/2021 and additional priors    FINDINGS:  Lower chest: Unremarkable.    Liver: Unremarkable.    Gallbladder and bile ducts: Unremarkable. No biliary ductal dilatation.    Pancreas: Unremarkable.    Spleen: Unremarkable.    Adrenals: Unremarkable.    Kidneys: Unremarkable.    Lymph nodes: No abdominal or pelvic lymphadenopathy.    Bowel and mesentery: Diverticulosis without evidence of diverticulitis.    Abdominal aorta: Unremarkable.    Inferior vena cava: Unremarkable.    Free fluid or free air: None.    Pelvis: Unremarkable.    Body wall: Unremarkable.    Bones: Unremarkable.                                       Medications   morphine injection 4 mg (4 mg Intravenous Given 2/1/25 0758)   ondansetron injection 4 mg (4 mg Intravenous Given 2/1/25 0759)   acetaminophen tablet 1,000 mg (1,000 mg Oral Given 2/1/25 0838)   iohexoL (OMNIPAQUE 350) injection 100 mL (100 mLs Intravenous Given 2/1/25 0937)   0.9% NaCl infusion (0 mLs Intravenous Stopped 2/1/25 1501)   ondansetron injection 4 mg (4 mg Intravenous Given 2/1/25 1239)   0.9% NaCl infusion (0 mLs Intravenous Stopped 2/1/25 1632)   HYDROmorphone injection 1 mg (1 mg Intravenous Given 2/1/25 1449)   ondansetron injection 4 mg (4 mg Intravenous Given 2/1/25 1632)     Medical Decision Making  7:34AM:  Patient is a 69-year-old female who presents to the emergency department with abdominal pain along with nausea/vomiting.  Patient appears well, nontoxic.  She does have some tenderness of the upper abdomen.  Will plan for labs, imaging, IV fluids, antiemetics, will continue to follow and reassess.    Amount and/or Complexity of Data Reviewed  External Data Reviewed: notes.  Labs: ordered. Decision-making details documented in ED Course.  Radiology: ordered. Decision-making details documented in ED Course.    Risk  OTC drugs.  Prescription drug  management.    4:08 PM:  Patient doing well, she is feeling much better.  She has been able to tolerate juice and crackers.  Her labs did show an elevated blood glucose level but no evidence of DKA and her labs otherwise unremarkable.  Her CT is negative for any acute findings in her urine is negative for any evidence of an infection.  Will plan to prescribe a course of Zofran to take as needed.  I do not feel that further work up in the ED is indicated at this time.  I updated pt regarding results and I counseled pt regarding supportive care measures.  I have discussed with the pt ED return warnings and need for close PCP f/u.  Pt agreeable to plan and all questions answered.  I feel that pt is stable for discharge and management as an outpatient and no further intervention is needed at this time.  Pt is comfortable returning to the ED if needed.  Will DC home in stable condition.          Scribe Attestation:   Scribe #1: I performed the above scribed service and the documentation accurately describes the services I performed. I attest to the accuracy of the note.              Physician Attestation for Scribe: I, Cynthia Cohen, reviewed documentation as scribed in my presence, which is both accurate and complete.                   Clinical Impression:  Final diagnoses:  [R10.10] Pain of upper abdomen (Primary)  [R11.10] Vomiting, unspecified vomiting type, unspecified whether nausea present          ED Disposition Condition    Discharge Stable          ED Prescriptions       Medication Sig Dispense Start Date End Date Auth. Provider    dicyclomine (BENTYL) 20 mg tablet Take 1 tablet (20 mg total) by mouth 3 (three) times daily as needed (abdominal pain). 20 tablet 2/1/2025 3/3/2025 Cynthia Cohen MD    ondansetron (ZOFRAN-ODT) 4 MG TbDL Take 1 tablet (4 mg total) by mouth every 8 (eight) hours as needed. 12 tablet 2/1/2025 -- Cynthia Cohen MD          Follow-up Information       Follow up With Specialties  Details Why Contact Info    Paulie Pedersen MD Pediatrics   [address] [facility] [address]  [phone]               Cynthia Cohen MD  02/01/25 2704

## 2025-02-01 NOTE — ED TRIAGE NOTES
Pt presents to ED c/o abdominal pain for 1 week. Reports pain radiates to left flank area and worsens with food or water. Endorses episodes of N/V. States she has not been able to take DM medication b/c she's feeling unwell and decreased appetite. Reports taking pepto bismol and tylenol with no relief. Denies fever or urinary symptoms. Aaox4, NAD noted

## 2025-02-01 NOTE — DISCHARGE INSTRUCTIONS
We have prescribed you medication for abdominal pain and nausea.  Please fill and take as directed.    Please return to the ER if you have chest pain, difficulty breathing, fevers, altered mental status, dizziness, weakness, or any other concerns.      Follow up with your primary care physician.

## 2025-02-07 ENCOUNTER — HOSPITAL ENCOUNTER (INPATIENT)
Facility: OTHER | Age: 70
LOS: 1 days | Discharge: HOME | DRG: 690 | End: 2025-02-10
Attending: EMERGENCY MEDICINE | Admitting: HOSPITALIST
Payer: MEDICARE

## 2025-02-07 DIAGNOSIS — R07.9 CHEST PAIN: ICD-10-CM

## 2025-02-07 DIAGNOSIS — R73.9 HYPERGLYCEMIA: ICD-10-CM

## 2025-02-07 DIAGNOSIS — R53.83 FATIGUE, UNSPECIFIED TYPE: ICD-10-CM

## 2025-02-07 DIAGNOSIS — E11.65 POORLY CONTROLLED DIABETES MELLITUS: Primary | ICD-10-CM

## 2025-02-07 DIAGNOSIS — I10 HYPERTENSION, UNSPECIFIED TYPE: ICD-10-CM

## 2025-02-07 DIAGNOSIS — R10.31 RIGHT LOWER QUADRANT ABDOMINAL PAIN: ICD-10-CM

## 2025-02-07 DIAGNOSIS — R11.10 VOMITING, UNSPECIFIED VOMITING TYPE, UNSPECIFIED WHETHER NAUSEA PRESENT: ICD-10-CM

## 2025-02-07 DIAGNOSIS — N12 PYELONEPHRITIS: ICD-10-CM

## 2025-02-07 DIAGNOSIS — R10.9 ABDOMINAL PAIN, UNSPECIFIED ABDOMINAL LOCATION: ICD-10-CM

## 2025-02-07 PROBLEM — K21.9 GASTROESOPHAGEAL REFLUX DISEASE WITHOUT ESOPHAGITIS: Status: ACTIVE | Noted: 2020-06-02

## 2025-02-07 LAB
ALBUMIN SERPL BCP-MCNC: 2.5 G/DL (ref 3.5–5.2)
ALP SERPL-CCNC: 161 U/L (ref 40–150)
ALT SERPL W/O P-5'-P-CCNC: 67 U/L (ref 10–44)
ANION GAP SERPL CALC-SCNC: 9 MMOL/L (ref 8–16)
AST SERPL-CCNC: 36 U/L (ref 10–40)
B-OH-BUTYR BLD STRIP-SCNC: 0.9 MMOL/L (ref 0–0.5)
BACTERIA #/AREA URNS HPF: ABNORMAL /HPF
BASOPHILS # BLD AUTO: 0.03 K/UL (ref 0–0.2)
BASOPHILS NFR BLD: 0.2 % (ref 0–1.9)
BILIRUB SERPL-MCNC: 0.4 MG/DL (ref 0.1–1)
BILIRUB UR QL STRIP: NEGATIVE
BUN SERPL-MCNC: 20 MG/DL (ref 8–23)
CALCIUM SERPL-MCNC: 9.1 MG/DL (ref 8.7–10.5)
CHLORIDE SERPL-SCNC: 102 MMOL/L (ref 95–110)
CLARITY UR: ABNORMAL
CO2 SERPL-SCNC: 24 MMOL/L (ref 23–29)
COLOR UR: YELLOW
CREAT SERPL-MCNC: 0.8 MG/DL (ref 0.5–1.4)
CREAT SERPL-MCNC: 1.5 MG/DL (ref 0.5–1.4)
DIFFERENTIAL METHOD BLD: ABNORMAL
EOSINOPHIL # BLD AUTO: 0 K/UL (ref 0–0.5)
EOSINOPHIL NFR BLD: 0.2 % (ref 0–8)
ERYTHROCYTE [DISTWIDTH] IN BLOOD BY AUTOMATED COUNT: 13.3 % (ref 11.5–14.5)
EST. GFR  (NO RACE VARIABLE): 38 ML/MIN/1.73 M^2
GLUCOSE SERPL-MCNC: 421 MG/DL (ref 70–110)
GLUCOSE UR QL STRIP: ABNORMAL
HCO3 UR-SCNC: 23.7 MMOL/L (ref 24–28)
HCT VFR BLD AUTO: 34.8 % (ref 37–48.5)
HGB BLD-MCNC: 11.3 G/DL (ref 12–16)
HGB UR QL STRIP: ABNORMAL
HYALINE CASTS #/AREA URNS LPF: 0 /LPF
IMM GRANULOCYTES # BLD AUTO: 0.09 K/UL (ref 0–0.04)
IMM GRANULOCYTES NFR BLD AUTO: 0.6 % (ref 0–0.5)
KETONES UR QL STRIP: ABNORMAL
LACTATE SERPL-SCNC: 1 MMOL/L (ref 0.5–2.2)
LEUKOCYTE ESTERASE UR QL STRIP: ABNORMAL
LIPASE SERPL-CCNC: 23 U/L (ref 4–60)
LYMPHOCYTES # BLD AUTO: 1.9 K/UL (ref 1–4.8)
LYMPHOCYTES NFR BLD: 13.2 % (ref 18–48)
MAGNESIUM SERPL-MCNC: 2.2 MG/DL (ref 1.6–2.6)
MCH RBC QN AUTO: 27.9 PG (ref 27–31)
MCHC RBC AUTO-ENTMCNC: 32.5 G/DL (ref 32–36)
MCV RBC AUTO: 86 FL (ref 82–98)
MICROSCOPIC COMMENT: ABNORMAL
MONOCYTES # BLD AUTO: 1.4 K/UL (ref 0.3–1)
MONOCYTES NFR BLD: 9.6 % (ref 4–15)
NEUTROPHILS # BLD AUTO: 10.7 K/UL (ref 1.8–7.7)
NEUTROPHILS NFR BLD: 76.2 % (ref 38–73)
NITRITE UR QL STRIP: NEGATIVE
NRBC BLD-RTO: 0 /100 WBC
PCO2 BLDA: 36.2 MMHG (ref 35–45)
PH SMN: 7.42 [PH] (ref 7.35–7.45)
PH UR STRIP: 6 [PH] (ref 5–8)
PLATELET # BLD AUTO: 276 K/UL (ref 150–450)
PMV BLD AUTO: 10.3 FL (ref 9.2–12.9)
PO2 BLDA: 43 MMHG (ref 40–60)
POC BE: -1 MMOL/L
POC SATURATED O2: 80 % (ref 95–100)
POC TCO2: 25 MMOL/L (ref 24–29)
POCT GLUCOSE: 267 MG/DL (ref 70–110)
POCT GLUCOSE: 273 MG/DL (ref 70–110)
POCT GLUCOSE: 359 MG/DL (ref 70–110)
POTASSIUM SERPL-SCNC: 4.4 MMOL/L (ref 3.5–5.1)
PROT SERPL-MCNC: 7.2 G/DL (ref 6–8.4)
PROT UR QL STRIP: ABNORMAL
RBC # BLD AUTO: 4.05 M/UL (ref 4–5.4)
RBC #/AREA URNS HPF: 10 /HPF (ref 0–4)
SAMPLE: ABNORMAL
SAMPLE: NORMAL
SODIUM SERPL-SCNC: 135 MMOL/L (ref 136–145)
SP GR UR STRIP: 1.03 (ref 1–1.03)
SQUAMOUS #/AREA URNS HPF: 5 /HPF
URN SPEC COLLECT METH UR: ABNORMAL
UROBILINOGEN UR STRIP-ACNC: NEGATIVE EU/DL
WBC # BLD AUTO: 14.09 K/UL (ref 3.9–12.7)
WBC #/AREA URNS HPF: 45 /HPF (ref 0–5)
WBC CLUMPS URNS QL MICRO: ABNORMAL
YEAST URNS QL MICRO: ABNORMAL

## 2025-02-07 PROCEDURE — 96375 TX/PRO/DX INJ NEW DRUG ADDON: CPT

## 2025-02-07 PROCEDURE — 82962 GLUCOSE BLOOD TEST: CPT

## 2025-02-07 PROCEDURE — 87040 BLOOD CULTURE FOR BACTERIA: CPT | Mod: 59 | Performed by: NURSE PRACTITIONER

## 2025-02-07 PROCEDURE — 85025 COMPLETE CBC W/AUTO DIFF WBC: CPT | Performed by: EMERGENCY MEDICINE

## 2025-02-07 PROCEDURE — 87088 URINE BACTERIA CULTURE: CPT | Performed by: EMERGENCY MEDICINE

## 2025-02-07 PROCEDURE — 83735 ASSAY OF MAGNESIUM: CPT | Performed by: EMERGENCY MEDICINE

## 2025-02-07 PROCEDURE — G0378 HOSPITAL OBSERVATION PER HR: HCPCS

## 2025-02-07 PROCEDURE — 82803 BLOOD GASES ANY COMBINATION: CPT

## 2025-02-07 PROCEDURE — 96361 HYDRATE IV INFUSION ADD-ON: CPT

## 2025-02-07 PROCEDURE — 99285 EMERGENCY DEPT VISIT HI MDM: CPT | Mod: 25

## 2025-02-07 PROCEDURE — 87086 URINE CULTURE/COLONY COUNT: CPT | Performed by: EMERGENCY MEDICINE

## 2025-02-07 PROCEDURE — 25500020 PHARM REV CODE 255: Performed by: EMERGENCY MEDICINE

## 2025-02-07 PROCEDURE — 96365 THER/PROPH/DIAG IV INF INIT: CPT

## 2025-02-07 PROCEDURE — 81000 URINALYSIS NONAUTO W/SCOPE: CPT | Performed by: EMERGENCY MEDICINE

## 2025-02-07 PROCEDURE — 63600175 PHARM REV CODE 636 W HCPCS: Performed by: NURSE PRACTITIONER

## 2025-02-07 PROCEDURE — 87186 SC STD MICRODIL/AGAR DIL: CPT | Performed by: EMERGENCY MEDICINE

## 2025-02-07 PROCEDURE — 96376 TX/PRO/DX INJ SAME DRUG ADON: CPT

## 2025-02-07 PROCEDURE — 82010 KETONE BODYS QUAN: CPT | Performed by: EMERGENCY MEDICINE

## 2025-02-07 PROCEDURE — 96372 THER/PROPH/DIAG INJ SC/IM: CPT | Performed by: NURSE PRACTITIONER

## 2025-02-07 PROCEDURE — 63600175 PHARM REV CODE 636 W HCPCS: Performed by: EMERGENCY MEDICINE

## 2025-02-07 PROCEDURE — 25000003 PHARM REV CODE 250: Performed by: EMERGENCY MEDICINE

## 2025-02-07 PROCEDURE — 83690 ASSAY OF LIPASE: CPT | Performed by: EMERGENCY MEDICINE

## 2025-02-07 PROCEDURE — 80053 COMPREHEN METABOLIC PANEL: CPT | Performed by: EMERGENCY MEDICINE

## 2025-02-07 PROCEDURE — 99900035 HC TECH TIME PER 15 MIN (STAT)

## 2025-02-07 PROCEDURE — 83605 ASSAY OF LACTIC ACID: CPT | Performed by: EMERGENCY MEDICINE

## 2025-02-07 RX ORDER — ONDANSETRON HYDROCHLORIDE 2 MG/ML
4 INJECTION, SOLUTION INTRAVENOUS EVERY 6 HOURS PRN
Status: DISCONTINUED | OUTPATIENT
Start: 2025-02-07 | End: 2025-02-10 | Stop reason: HOSPADM

## 2025-02-07 RX ORDER — GLUCAGON 1 MG
1 KIT INJECTION
Status: DISCONTINUED | OUTPATIENT
Start: 2025-02-07 | End: 2025-02-10 | Stop reason: HOSPADM

## 2025-02-07 RX ORDER — MORPHINE SULFATE 2 MG/ML
2 INJECTION, SOLUTION INTRAMUSCULAR; INTRAVENOUS EVERY 4 HOURS PRN
Status: DISCONTINUED | OUTPATIENT
Start: 2025-02-07 | End: 2025-02-08

## 2025-02-07 RX ORDER — MORPHINE SULFATE 4 MG/ML
4 INJECTION, SOLUTION INTRAMUSCULAR; INTRAVENOUS EVERY 4 HOURS PRN
Status: DISCONTINUED | OUTPATIENT
Start: 2025-02-07 | End: 2025-02-08

## 2025-02-07 RX ORDER — GLUCAGON 1 MG
1 KIT INJECTION
Status: DISCONTINUED | OUTPATIENT
Start: 2025-02-07 | End: 2025-02-07

## 2025-02-07 RX ORDER — CEFTRIAXONE 2 G/1
2 INJECTION, POWDER, FOR SOLUTION INTRAMUSCULAR; INTRAVENOUS
Status: COMPLETED | OUTPATIENT
Start: 2025-02-07 | End: 2025-02-07

## 2025-02-07 RX ORDER — CEFTRIAXONE 2 G/1
2 INJECTION, POWDER, FOR SOLUTION INTRAMUSCULAR; INTRAVENOUS
Status: DISCONTINUED | OUTPATIENT
Start: 2025-02-08 | End: 2025-02-10 | Stop reason: HOSPADM

## 2025-02-07 RX ORDER — INSULIN DEGLUDEC 100 U/ML
24 INJECTION, SOLUTION SUBCUTANEOUS NIGHTLY
Status: ON HOLD | COMMUNITY
Start: 2024-08-24 | End: 2025-02-10 | Stop reason: HOSPADM

## 2025-02-07 RX ORDER — IBUPROFEN 200 MG
24 TABLET ORAL
Status: DISCONTINUED | OUTPATIENT
Start: 2025-02-07 | End: 2025-02-07

## 2025-02-07 RX ORDER — INSULIN ASPART 100 [IU]/ML
0-5 INJECTION, SOLUTION INTRAVENOUS; SUBCUTANEOUS
Status: DISCONTINUED | OUTPATIENT
Start: 2025-02-07 | End: 2025-02-07

## 2025-02-07 RX ORDER — ASPIRIN 81 MG/1
81 TABLET ORAL DAILY
Status: DISCONTINUED | OUTPATIENT
Start: 2025-02-08 | End: 2025-02-10 | Stop reason: HOSPADM

## 2025-02-07 RX ORDER — PANTOPRAZOLE SODIUM 40 MG/10ML
40 INJECTION, POWDER, LYOPHILIZED, FOR SOLUTION INTRAVENOUS DAILY
Status: DISCONTINUED | OUTPATIENT
Start: 2025-02-07 | End: 2025-02-10 | Stop reason: HOSPADM

## 2025-02-07 RX ORDER — LIDOCAINE 50 MG/G
1 PATCH TOPICAL DAILY
Status: DISCONTINUED | OUTPATIENT
Start: 2025-02-08 | End: 2025-02-10 | Stop reason: HOSPADM

## 2025-02-07 RX ORDER — INSULIN GLARGINE 100 [IU]/ML
12 INJECTION, SOLUTION SUBCUTANEOUS NIGHTLY
Status: DISCONTINUED | OUTPATIENT
Start: 2025-02-07 | End: 2025-02-08

## 2025-02-07 RX ORDER — SODIUM CHLORIDE, SODIUM LACTATE, POTASSIUM CHLORIDE, CALCIUM CHLORIDE 600; 310; 30; 20 MG/100ML; MG/100ML; MG/100ML; MG/100ML
INJECTION, SOLUTION INTRAVENOUS CONTINUOUS
Status: DISCONTINUED | OUTPATIENT
Start: 2025-02-07 | End: 2025-02-08

## 2025-02-07 RX ORDER — SODIUM CHLORIDE 0.9 % (FLUSH) 0.9 %
10 SYRINGE (ML) INJECTION EVERY 8 HOURS PRN
Status: DISCONTINUED | OUTPATIENT
Start: 2025-02-07 | End: 2025-02-08

## 2025-02-07 RX ORDER — HEPARIN SODIUM 5000 [USP'U]/ML
5000 INJECTION, SOLUTION INTRAVENOUS; SUBCUTANEOUS EVERY 8 HOURS
Status: DISCONTINUED | OUTPATIENT
Start: 2025-02-07 | End: 2025-02-10 | Stop reason: HOSPADM

## 2025-02-07 RX ORDER — LISINOPRIL 20 MG/1
40 TABLET ORAL DAILY
Status: DISCONTINUED | OUTPATIENT
Start: 2025-02-08 | End: 2025-02-07

## 2025-02-07 RX ORDER — ATORVASTATIN CALCIUM 10 MG/1
10 TABLET, FILM COATED ORAL DAILY
Status: DISCONTINUED | OUTPATIENT
Start: 2025-02-08 | End: 2025-02-10 | Stop reason: HOSPADM

## 2025-02-07 RX ORDER — IPRATROPIUM BROMIDE AND ALBUTEROL SULFATE 2.5; .5 MG/3ML; MG/3ML
3 SOLUTION RESPIRATORY (INHALATION) EVERY 6 HOURS PRN
Status: DISCONTINUED | OUTPATIENT
Start: 2025-02-07 | End: 2025-02-10 | Stop reason: HOSPADM

## 2025-02-07 RX ORDER — NALOXONE HCL 0.4 MG/ML
0.02 VIAL (ML) INJECTION
Status: DISCONTINUED | OUTPATIENT
Start: 2025-02-07 | End: 2025-02-10 | Stop reason: HOSPADM

## 2025-02-07 RX ORDER — INSULIN ASPART 100 [IU]/ML
0-10 INJECTION, SOLUTION INTRAVENOUS; SUBCUTANEOUS EVERY 6 HOURS PRN
Status: DISCONTINUED | OUTPATIENT
Start: 2025-02-07 | End: 2025-02-10 | Stop reason: HOSPADM

## 2025-02-07 RX ORDER — IBUPROFEN 200 MG
16 TABLET ORAL
Status: DISCONTINUED | OUTPATIENT
Start: 2025-02-07 | End: 2025-02-07

## 2025-02-07 RX ORDER — TALC
6 POWDER (GRAM) TOPICAL NIGHTLY PRN
Status: DISCONTINUED | OUTPATIENT
Start: 2025-02-07 | End: 2025-02-10 | Stop reason: HOSPADM

## 2025-02-07 RX ORDER — INSULIN ASPART 100 [IU]/ML
0-5 INJECTION, SOLUTION INTRAVENOUS; SUBCUTANEOUS 4 TIMES DAILY
Status: DISCONTINUED | OUTPATIENT
Start: 2025-02-07 | End: 2025-02-07

## 2025-02-07 RX ORDER — MORPHINE SULFATE 4 MG/ML
4 INJECTION, SOLUTION INTRAMUSCULAR; INTRAVENOUS
Status: COMPLETED | OUTPATIENT
Start: 2025-02-07 | End: 2025-02-07

## 2025-02-07 RX ORDER — PROCHLORPERAZINE EDISYLATE 5 MG/ML
5 INJECTION INTRAMUSCULAR; INTRAVENOUS EVERY 6 HOURS PRN
Status: DISCONTINUED | OUTPATIENT
Start: 2025-02-07 | End: 2025-02-10 | Stop reason: HOSPADM

## 2025-02-07 RX ORDER — ONDANSETRON HYDROCHLORIDE 2 MG/ML
4 INJECTION, SOLUTION INTRAVENOUS
Status: COMPLETED | OUTPATIENT
Start: 2025-02-07 | End: 2025-02-07

## 2025-02-07 RX ORDER — ACETAMINOPHEN 325 MG/1
650 TABLET ORAL EVERY 8 HOURS PRN
Status: DISCONTINUED | OUTPATIENT
Start: 2025-02-07 | End: 2025-02-10 | Stop reason: HOSPADM

## 2025-02-07 RX ADMIN — CEFTRIAXONE SODIUM 2 G: 2 INJECTION, POWDER, FOR SOLUTION INTRAMUSCULAR; INTRAVENOUS at 06:02

## 2025-02-07 RX ADMIN — SODIUM CHLORIDE 1000 ML: 9 INJECTION, SOLUTION INTRAVENOUS at 05:02

## 2025-02-07 RX ADMIN — PANTOPRAZOLE SODIUM 40 MG: 40 INJECTION, POWDER, LYOPHILIZED, FOR SOLUTION INTRAVENOUS at 10:02

## 2025-02-07 RX ADMIN — ONDANSETRON 4 MG: 2 INJECTION INTRAMUSCULAR; INTRAVENOUS at 04:02

## 2025-02-07 RX ADMIN — SODIUM CHLORIDE, POTASSIUM CHLORIDE, SODIUM LACTATE AND CALCIUM CHLORIDE: 600; 310; 30; 20 INJECTION, SOLUTION INTRAVENOUS at 10:02

## 2025-02-07 RX ADMIN — INSULIN HUMAN 4 UNITS: 100 INJECTION, SOLUTION PARENTERAL at 05:02

## 2025-02-07 RX ADMIN — HEPARIN SODIUM 5000 UNITS: 5000 INJECTION INTRAVENOUS; SUBCUTANEOUS at 10:02

## 2025-02-07 RX ADMIN — MORPHINE SULFATE 4 MG: 4 INJECTION, SOLUTION INTRAMUSCULAR; INTRAVENOUS at 04:02

## 2025-02-07 RX ADMIN — PROMETHAZINE HYDROCHLORIDE 12.5 MG: 25 INJECTION INTRAMUSCULAR; INTRAVENOUS at 07:02

## 2025-02-07 RX ADMIN — MORPHINE SULFATE 4 MG: 4 INJECTION, SOLUTION INTRAMUSCULAR; INTRAVENOUS at 08:02

## 2025-02-07 RX ADMIN — IOHEXOL 100 ML: 350 INJECTION, SOLUTION INTRAVENOUS at 05:02

## 2025-02-07 RX ADMIN — INSULIN GLARGINE 12 UNITS: 100 INJECTION, SOLUTION SUBCUTANEOUS at 10:02

## 2025-02-07 RX ADMIN — SODIUM CHLORIDE 1000 ML: 9 INJECTION, SOLUTION INTRAVENOUS at 04:02

## 2025-02-07 NOTE — ED TRIAGE NOTES
Pt presents to ED c/o lower abd pain radiating to L side with N/V onset 1 week ago. Pt seen last week and reports no improvement. Denies diarrhea.

## 2025-02-07 NOTE — ED PROVIDER NOTES
"Source of History:  ***    Chief complaint:  Abdominal Pain (Lower abd pain radiating to side, +n/v, denies diarrhea, slight constipation. Seen here 1 week ago states no improvement)      HPI:  Annie Matthews is a 69 y.o. female presenting with ***    This is the extent to the patients complaints today here in the emergency department.    ROS: As per HPI and below:  Constitutional: No fever.  No chills.  Eyes: No visual changes.  ENT: No sore throat. No ear pain    Cardiovascular: No chest pain.  Respiratory: No shortness of breath.  GI: ***  Genitourinary: No abnormal urination.  Neurologic: No headache. No focal weakness.  No numbness.  MSK: no back pain.  Integument: No rashes or lesions.  Hematologic: No easy bruising.  Endocrine: No excessive thirst or urination.    Review of patient's allergies indicates:   Allergen Reactions    Sulfa (sulfonamide antibiotics)     Codeine Nausea And Vomiting       PMH:  As per HPI and below:  Past Medical History:   Diagnosis Date    Atrial fibrillation     Diabetes mellitus     Hypertension      Past Surgical History:   Procedure Laterality Date    HYSTERECTOMY         Social History     Tobacco Use    Smoking status: Never   Substance Use Topics    Alcohol use: No    Drug use: No       Physical Exam:    BP (!) 124/58 (BP Location: Left arm)   Pulse 97   Temp 99.8 °F (37.7 °C) (Oral)   Resp 20   Ht 5' 2" (1.575 m)   Wt 90.7 kg (200 lb)   SpO2 97%   BMI 36.58 kg/m²   Nursing note and vital signs reviewed.  Constitutional: No acute distress.  Nontoxic  Eyes: No conjunctival injection.  Extraocular muscles are intact.  ENT: Oropharynx clear.  Normal phonation.  Cardiovascular: Regular rate and rhythm.  No murmurs. No gallops. No rubs  Respiratory: Clear to auscultation bilaterally.  Good air movement.  No wheezes.  No rhonchi. No rales. No accessory muscle use.  Abdomen:  ***  Musculoskeletal: Good range of motion all joints.  No deformities.  Neck supple.  No " "meningismus.  Skin: No rashes seen.  Good turgor.  No abrasions.  No ecchymoses.  Neuro: alert and oriented x3,  no focal neurological deficits.  Psych: Appropriate, conversant    MDM    Emergent evaluation of a *** yo *** presenting for ***.  On exam pt is A&Ox3. VSS. Nonfebrile and nontoxic appearing.  Mucous membranes pink and moist. Tonsils with no redness, erythema or exudates. Breath sounds clear bilaterally.  Abdomen soft and nontender. No rebound or guarding appreciated on exam.   BS WNL.  Pt speaking in full sentences.  Steady gait appreciated. Cap refill < 3 seconds.      History Acquisition   Additional history {WAS/WAS NOT:[id number]::"was not"} acquired from other historians.  ***  The patient's list of active medical problems, social history, medications, and allergies as documented per RN staff has been reviewed.     Differential Diagnoses   Based on available information and the initial assessment, the working differential diagnoses considered during this evaluation include but are not limited to ***.    I will get ***      LABS     Labs Reviewed   CBC W/ AUTO DIFFERENTIAL   COMPREHENSIVE METABOLIC PANEL   LIPASE   MAGNESIUM   LACTIC ACID, PLASMA   URINALYSIS, REFLEX TO URINE CULTURE   BETA - HYDROXYBUTYRATE, SERUM         Imaging     Imaging Results    None         A radiology report {WAS WAS NOT:[id number]} available for my review at the time of the encounter.    EKG        Additional Consideration   All available testing was considered during the course of this workup.  Comorbidities taken into consideration during the patient's evaluation and treatment include weight, age.    Social determinants of health were taken into consideration during development of our treatment plan.    Medications   sodium chloride 0.9% bolus 1,000 mL 1,000 mL (has no administration in time range)                 CLINICAL IMPRESSION  No diagnosis found.        Instruction:  I see no indication of an emergent process beyond " that addressed during our encounter but have duly counseled the patient/family regarding the need for prompt follow-up as well as the indications that should prompt immediate return to the emergency room should new or worrisome developments occur.  The patient/family has been provided with verbal and printed direction regarding our final diagnosis(es) as well as instructions regarding use of OTC and/or Rx medications intended to manage the patient's aforementioned conditions including:  ED Prescriptions    None       Patient has been advised of following recommended follow-up instructions:  Follow-up Information    None       The patient/family communicates understanding of all this information and all remaining questions and concerns were addressed at this time.      The patient's condition {Desc; did/not:64758} warrant review of the  and prescription of controlled substances.      This note was created using dictation software.  This program may occasionally mistype words and phrases.

## 2025-02-08 LAB
ALBUMIN SERPL BCP-MCNC: 2.3 G/DL (ref 3.5–5.2)
ALP SERPL-CCNC: 146 U/L (ref 40–150)
ALT SERPL W/O P-5'-P-CCNC: 50 U/L (ref 10–44)
ANION GAP SERPL CALC-SCNC: 11 MMOL/L (ref 8–16)
AST SERPL-CCNC: 16 U/L (ref 10–40)
BASOPHILS # BLD AUTO: 0.02 K/UL (ref 0–0.2)
BASOPHILS NFR BLD: 0.2 % (ref 0–1.9)
BILIRUB SERPL-MCNC: 0.3 MG/DL (ref 0.1–1)
BUN SERPL-MCNC: 15 MG/DL (ref 8–23)
CALCIUM SERPL-MCNC: 8.8 MG/DL (ref 8.7–10.5)
CHLORIDE SERPL-SCNC: 106 MMOL/L (ref 95–110)
CO2 SERPL-SCNC: 22 MMOL/L (ref 23–29)
CREAT SERPL-MCNC: 1.1 MG/DL (ref 0.5–1.4)
DIFFERENTIAL METHOD BLD: ABNORMAL
EOSINOPHIL # BLD AUTO: 0.1 K/UL (ref 0–0.5)
EOSINOPHIL NFR BLD: 0.7 % (ref 0–8)
ERYTHROCYTE [DISTWIDTH] IN BLOOD BY AUTOMATED COUNT: 13.6 % (ref 11.5–14.5)
EST. GFR  (NO RACE VARIABLE): 55 ML/MIN/1.73 M^2
ESTIMATED AVG GLUCOSE: 355 MG/DL (ref 68–131)
GLUCOSE SERPL-MCNC: 236 MG/DL (ref 70–110)
HBA1C MFR BLD: 14 % (ref 4–5.6)
HCT VFR BLD AUTO: 33 % (ref 37–48.5)
HGB BLD-MCNC: 10.8 G/DL (ref 12–16)
IMM GRANULOCYTES # BLD AUTO: 0.14 K/UL (ref 0–0.04)
IMM GRANULOCYTES NFR BLD AUTO: 1.1 % (ref 0–0.5)
LYMPHOCYTES # BLD AUTO: 2.1 K/UL (ref 1–4.8)
LYMPHOCYTES NFR BLD: 15.9 % (ref 18–48)
MAGNESIUM SERPL-MCNC: 2.2 MG/DL (ref 1.6–2.6)
MCH RBC QN AUTO: 28.3 PG (ref 27–31)
MCHC RBC AUTO-ENTMCNC: 32.7 G/DL (ref 32–36)
MCV RBC AUTO: 87 FL (ref 82–98)
MONOCYTES # BLD AUTO: 1.4 K/UL (ref 0.3–1)
MONOCYTES NFR BLD: 10.3 % (ref 4–15)
NEUTROPHILS # BLD AUTO: 9.4 K/UL (ref 1.8–7.7)
NEUTROPHILS NFR BLD: 71.8 % (ref 38–73)
NRBC BLD-RTO: 0 /100 WBC
PHOSPHATE SERPL-MCNC: 2.5 MG/DL (ref 2.7–4.5)
PLATELET # BLD AUTO: 271 K/UL (ref 150–450)
PMV BLD AUTO: 9.5 FL (ref 9.2–12.9)
POCT GLUCOSE: 229 MG/DL (ref 70–110)
POCT GLUCOSE: 259 MG/DL (ref 70–110)
POCT GLUCOSE: 329 MG/DL (ref 70–110)
POCT GLUCOSE: 350 MG/DL (ref 70–110)
POTASSIUM SERPL-SCNC: 4.3 MMOL/L (ref 3.5–5.1)
PROT SERPL-MCNC: 7 G/DL (ref 6–8.4)
RBC # BLD AUTO: 3.81 M/UL (ref 4–5.4)
SODIUM SERPL-SCNC: 139 MMOL/L (ref 136–145)
WBC # BLD AUTO: 13.08 K/UL (ref 3.9–12.7)

## 2025-02-08 PROCEDURE — 96372 THER/PROPH/DIAG INJ SC/IM: CPT | Performed by: NURSE PRACTITIONER

## 2025-02-08 PROCEDURE — 96375 TX/PRO/DX INJ NEW DRUG ADDON: CPT

## 2025-02-08 PROCEDURE — 96361 HYDRATE IV INFUSION ADD-ON: CPT

## 2025-02-08 PROCEDURE — 96374 THER/PROPH/DIAG INJ IV PUSH: CPT | Mod: 59

## 2025-02-08 PROCEDURE — 83735 ASSAY OF MAGNESIUM: CPT | Performed by: NURSE PRACTITIONER

## 2025-02-08 PROCEDURE — 63600175 PHARM REV CODE 636 W HCPCS: Performed by: NURSE PRACTITIONER

## 2025-02-08 PROCEDURE — 25000003 PHARM REV CODE 250: Performed by: NURSE PRACTITIONER

## 2025-02-08 PROCEDURE — 80053 COMPREHEN METABOLIC PANEL: CPT | Performed by: NURSE PRACTITIONER

## 2025-02-08 PROCEDURE — 96376 TX/PRO/DX INJ SAME DRUG ADON: CPT

## 2025-02-08 PROCEDURE — G0378 HOSPITAL OBSERVATION PER HR: HCPCS

## 2025-02-08 PROCEDURE — 85025 COMPLETE CBC W/AUTO DIFF WBC: CPT | Performed by: NURSE PRACTITIONER

## 2025-02-08 PROCEDURE — 84100 ASSAY OF PHOSPHORUS: CPT | Performed by: NURSE PRACTITIONER

## 2025-02-08 PROCEDURE — 36415 COLL VENOUS BLD VENIPUNCTURE: CPT | Performed by: HOSPITALIST

## 2025-02-08 PROCEDURE — 94761 N-INVAS EAR/PLS OXIMETRY MLT: CPT

## 2025-02-08 PROCEDURE — 83036 HEMOGLOBIN GLYCOSYLATED A1C: CPT | Performed by: HOSPITALIST

## 2025-02-08 RX ORDER — AMLODIPINE BESYLATE 5 MG/1
5 TABLET ORAL DAILY
Status: DISCONTINUED | OUTPATIENT
Start: 2025-02-08 | End: 2025-02-10 | Stop reason: HOSPADM

## 2025-02-08 RX ORDER — KETOROLAC TROMETHAMINE 10 MG/1
10 TABLET, FILM COATED ORAL ONCE AS NEEDED
Status: COMPLETED | OUTPATIENT
Start: 2025-02-08 | End: 2025-02-08

## 2025-02-08 RX ORDER — DICYCLOMINE HYDROCHLORIDE 10 MG/1
10 CAPSULE ORAL
Status: DISCONTINUED | OUTPATIENT
Start: 2025-02-08 | End: 2025-02-10 | Stop reason: HOSPADM

## 2025-02-08 RX ORDER — SODIUM,POTASSIUM PHOSPHATES 280-250MG
2 POWDER IN PACKET (EA) ORAL EVERY 4 HOURS
Status: COMPLETED | OUTPATIENT
Start: 2025-02-08 | End: 2025-02-08

## 2025-02-08 RX ORDER — ONDANSETRON 4 MG/1
4 TABLET, ORALLY DISINTEGRATING ORAL ONCE
Status: COMPLETED | OUTPATIENT
Start: 2025-02-08 | End: 2025-02-08

## 2025-02-08 RX ORDER — IBUPROFEN 200 MG
24 TABLET ORAL
Status: DISCONTINUED | OUTPATIENT
Start: 2025-02-08 | End: 2025-02-10 | Stop reason: HOSPADM

## 2025-02-08 RX ORDER — GRANULES FOR ORAL 3 G/1
3 POWDER ORAL ONCE
Status: CANCELLED | OUTPATIENT
Start: 2025-02-08 | End: 2025-02-08

## 2025-02-08 RX ORDER — IBUPROFEN 200 MG
16 TABLET ORAL
Status: DISCONTINUED | OUTPATIENT
Start: 2025-02-08 | End: 2025-02-10 | Stop reason: HOSPADM

## 2025-02-08 RX ORDER — SODIUM CHLORIDE 9 MG/ML
INJECTION, SOLUTION INTRAVENOUS CONTINUOUS
Status: DISCONTINUED | OUTPATIENT
Start: 2025-02-08 | End: 2025-02-09

## 2025-02-08 RX ORDER — POLYETHYLENE GLYCOL 3350 17 G/17G
17 POWDER, FOR SOLUTION ORAL DAILY
Status: DISCONTINUED | OUTPATIENT
Start: 2025-02-08 | End: 2025-02-10 | Stop reason: HOSPADM

## 2025-02-08 RX ORDER — GABAPENTIN 100 MG/1
200 CAPSULE ORAL 3 TIMES DAILY PRN
Status: DISCONTINUED | OUTPATIENT
Start: 2025-02-08 | End: 2025-02-09

## 2025-02-08 RX ORDER — LISINOPRIL 20 MG/1
40 TABLET ORAL DAILY
Status: DISCONTINUED | OUTPATIENT
Start: 2025-02-08 | End: 2025-02-10 | Stop reason: HOSPADM

## 2025-02-08 RX ORDER — INSULIN GLARGINE 100 [IU]/ML
14 INJECTION, SOLUTION SUBCUTANEOUS NIGHTLY
Status: DISCONTINUED | OUTPATIENT
Start: 2025-02-08 | End: 2025-02-09

## 2025-02-08 RX ORDER — INSULIN ASPART 100 [IU]/ML
3 INJECTION, SOLUTION INTRAVENOUS; SUBCUTANEOUS
Status: DISCONTINUED | OUTPATIENT
Start: 2025-02-08 | End: 2025-02-09

## 2025-02-08 RX ADMIN — HEPARIN SODIUM 5000 UNITS: 5000 INJECTION INTRAVENOUS; SUBCUTANEOUS at 01:02

## 2025-02-08 RX ADMIN — ONDANSETRON 4 MG: 2 INJECTION INTRAMUSCULAR; INTRAVENOUS at 01:02

## 2025-02-08 RX ADMIN — KETOROLAC TROMETHAMINE 10 MG: 10 TABLET, FILM COATED ORAL at 09:02

## 2025-02-08 RX ADMIN — ACETAMINOPHEN 650 MG: 325 TABLET, FILM COATED ORAL at 08:02

## 2025-02-08 RX ADMIN — INSULIN ASPART 4 UNITS: 100 INJECTION, SOLUTION INTRAVENOUS; SUBCUTANEOUS at 06:02

## 2025-02-08 RX ADMIN — HEPARIN SODIUM 5000 UNITS: 5000 INJECTION INTRAVENOUS; SUBCUTANEOUS at 09:02

## 2025-02-08 RX ADMIN — MORPHINE SULFATE 4 MG: 4 INJECTION, SOLUTION INTRAMUSCULAR; INTRAVENOUS at 01:02

## 2025-02-08 RX ADMIN — HEPARIN SODIUM 5000 UNITS: 5000 INJECTION INTRAVENOUS; SUBCUTANEOUS at 05:02

## 2025-02-08 RX ADMIN — AMLODIPINE BESYLATE 5 MG: 5 TABLET ORAL at 05:02

## 2025-02-08 RX ADMIN — ASPIRIN 81 MG: 81 TABLET, COATED ORAL at 08:02

## 2025-02-08 RX ADMIN — MELATONIN TAB 3 MG 6 MG: 3 TAB at 09:02

## 2025-02-08 RX ADMIN — MORPHINE SULFATE 2 MG: 2 INJECTION, SOLUTION INTRAMUSCULAR; INTRAVENOUS at 05:02

## 2025-02-08 RX ADMIN — INSULIN GLARGINE 14 UNITS: 100 INJECTION, SOLUTION SUBCUTANEOUS at 09:02

## 2025-02-08 RX ADMIN — ONDANSETRON 4 MG: 4 TABLET, ORALLY DISINTEGRATING ORAL at 09:02

## 2025-02-08 RX ADMIN — CEFTRIAXONE SODIUM 2 G: 2 INJECTION, POWDER, FOR SOLUTION INTRAMUSCULAR; INTRAVENOUS at 05:02

## 2025-02-08 RX ADMIN — ATORVASTATIN CALCIUM 10 MG: 10 TABLET, FILM COATED ORAL at 08:02

## 2025-02-08 RX ADMIN — LISINOPRIL 40 MG: 20 TABLET ORAL at 01:02

## 2025-02-08 RX ADMIN — POLYETHYLENE GLYCOL 3350 17 G: 17 POWDER, FOR SOLUTION ORAL at 01:02

## 2025-02-08 RX ADMIN — DICYCLOMINE HYDROCHLORIDE 10 MG: 10 CAPSULE ORAL at 09:02

## 2025-02-08 RX ADMIN — MORPHINE SULFATE 2 MG: 2 INJECTION, SOLUTION INTRAMUSCULAR; INTRAVENOUS at 01:02

## 2025-02-08 RX ADMIN — INSULIN ASPART 3 UNITS: 100 INJECTION, SOLUTION INTRAVENOUS; SUBCUTANEOUS at 12:02

## 2025-02-08 RX ADMIN — ONDANSETRON 4 MG: 2 INJECTION INTRAMUSCULAR; INTRAVENOUS at 05:02

## 2025-02-08 RX ADMIN — GABAPENTIN 200 MG: 100 CAPSULE ORAL at 05:02

## 2025-02-08 RX ADMIN — PANTOPRAZOLE SODIUM 40 MG: 40 INJECTION, POWDER, LYOPHILIZED, FOR SOLUTION INTRAVENOUS at 08:02

## 2025-02-08 RX ADMIN — POTASSIUM & SODIUM PHOSPHATES POWDER PACK 280-160-250 MG 2 PACKET: 280-160-250 PACK at 05:02

## 2025-02-08 RX ADMIN — INSULIN ASPART 3 UNITS: 100 INJECTION, SOLUTION INTRAVENOUS; SUBCUTANEOUS at 01:02

## 2025-02-08 RX ADMIN — DICYCLOMINE HYDROCHLORIDE 10 MG: 10 CAPSULE ORAL at 05:02

## 2025-02-08 RX ADMIN — POTASSIUM & SODIUM PHOSPHATES POWDER PACK 280-160-250 MG 2 PACKET: 280-160-250 PACK at 01:02

## 2025-02-08 RX ADMIN — ACETAMINOPHEN 650 MG: 325 TABLET, FILM COATED ORAL at 05:02

## 2025-02-08 RX ADMIN — INSULIN ASPART 3 UNITS: 100 INJECTION, SOLUTION INTRAVENOUS; SUBCUTANEOUS at 09:02

## 2025-02-08 RX ADMIN — LIDOCAINE 5% 1 PATCH: 700 PATCH TOPICAL at 08:02

## 2025-02-08 NOTE — PLAN OF CARE
Case Management Assessment     PCP: Virgen Monique NP at [facility]   Pharmacy: [facility]    Patient Arrived From: Home   Existing Help at Home: Family    Barriers to Discharge: None     Discharge Plan:    A. Home    B. Home with family   [facility] ([place])  Initial Discharge Assessment       Primary Care Provider: Paulie Pedersen MD    Admission Diagnosis: Pyelonephritis [N12]  Chest pain [R07.9]    Admission Date: 2/7/2025  Expected Discharge Date:     Transition of Care Barriers: None    Payor: HU[employer] MANAGED MEDICARE / Plan: HU[employer] SNP HMO PPO SPECIAL NEEDS / Product Type: Medicare Advantage /     Extended Emergency Contact Information  Primary Emergency Contact: Le Matthews   [facility]  Home Phone: [phone]  Relation: Sister    Discharge Plan A: Home  Discharge Plan B: Home with family      [id number] [place], [place] - [address] AT [facility] [address]  [address]  [place] [facility] [address]  Phone: [phone] Fax: [phone]      Initial Assessment (most recent)       Adult Discharge Assessment - 02/08/25 0753          Discharge Assessment    Assessment Type Discharge Planning Assessment     Confirmed/corrected address, phone number and insurance Yes     Confirmed Demographics Correct on Facesheet     Source of Information patient;health record     Does patient/caregiver understand observation status No     People in Home alone     Do you expect to return to your current living situation? Yes     Do you have help at home or someone to help you manage your care at home? Yes     Who are your caregiver(s) and their phone number(s)? Family     Prior to hospitilization cognitive status: Alert/Oriented     Current cognitive status: Alert/Oriented     Walking or Climbing Stairs Difficulty yes     Walking or Climbing Stairs ambulation difficulty, requires equipment     Dressing/Bathing Difficulty no     Equipment  Currently Used at Home wheelchair;walker, rolling     Readmission within 30 days? No     Patient currently being followed by outpatient case management? No     Do you currently have service(s) that help you manage your care at home? No     Do you take prescription medications? Yes     Do you have prescription coverage? Yes     Do you have any problems affording any of your prescribed medications? No     Is the patient taking medications as prescribed? yes     How do you get to doctors appointments? car, drives self;family or friend will provide     Are you on dialysis? No     Do you take coumadin? No     Discharge Plan A Home     Discharge Plan B Home with family     DME Needed Upon Discharge  none     Discharge Plan discussed with: Patient     Transition of Care Barriers None        Physical Activity    On average, how many days per week do you engage in moderate to strenuous exercise (like a brisk walk)? 0 days     On average, how many minutes do you engage in exercise at this level? 0 min        Financial Resource Strain    How hard is it for you to pay for the very basics like food, housing, medical care, and heating? Not hard at all (P)         Housing Stability    In the last 12 months, was there a time when you were not able to pay the mortgage or rent on time? No (P)      At any time in the past 12 months, were you homeless or living in a shelter (including now)? No (P)         Transportation Needs    Has the lack of transportation kept you from medical appointments, meetings, work or from getting things needed for daily living? No (P)         Food Insecurity    Within the past 12 months, you worried that your food would run out before you got the money to buy more. Never true (P)      Within the past 12 months, the food you bought just didn't last and you didn't have money to get more. Never true (P)         Stress    Do you feel stress - tense, restless, nervous, or anxious, or unable to sleep at night  because your mind is troubled all the time - these days? Not at all (P)         Social Isolation    How often do you feel lonely or isolated from those around you?  Never (P)         Alcohol Use    Q1: How often do you have a drink containing alcohol? Never (P)      Q2: How many drinks containing alcohol do you have on a typical day when you are drinking? Patient does not drink (P)      Q3: How often do you have six or more drinks on one occasion? Never (P)         Utilities    In the past 12 months has the electric, gas, oil, or water company threatened to shut off services in your home? No (P)         Health Literacy    How often do you need to have someone help you when you read instructions, pamphlets, or other written material from your doctor or pharmacy? Never (P)

## 2025-02-08 NOTE — ASSESSMENT & PLAN NOTE
History noted.  Most recent A1c 13.6 performed 11/06/2024.  Patient is prescribed 24 units Tresiba daily    -continue long-acting insulin at reduced dose titrate up when clinically appropriate  -low-dose SSI  -Accu-Cheks  -add prandial aspart/discussed A1c and elevated sugar with patient.  Discussed gastroparesis.  Patient will need outpatient follow-up with diabetic educator

## 2025-02-08 NOTE — PLAN OF CARE
POC reviewed with patient. AAOx4. Stable on room air. VS and physical assessment per flowsheets. Complaints of pain treated with PRN pain medication according to MAR. CBG monitored and insulin administered per order. PRN Zofran administered for n/v. LR infusing at 100 mL/hr.  No other complaints verbalized during shift. No injuries, falls, or trauma occurred during shift. Purposeful rounding completed. Bed low and locked with side rails up x 3 and call light within reach. Safety maintained throughout shift.     Problem: Adult Inpatient Plan of Care  Goal: Plan of Care Review  Outcome: Progressing  Goal: Patient-Specific Goal (Individualized)  Outcome: Progressing  Goal: Absence of Hospital-Acquired Illness or Injury  Outcome: Progressing  Goal: Optimal Comfort and Wellbeing  Outcome: Progressing  Goal: Readiness for Transition of Care  Outcome: Progressing     Problem: Diabetes Comorbidity  Goal: Blood Glucose Level Within Targeted Range  Outcome: Progressing

## 2025-02-08 NOTE — HPI
Annie Matthews is a 69 year old female with a past medical history of HTN, diabetes and hyperlipidemia who presents with worsening abdominal pain that radiates to her left flank with associated nausea and vomiting.  She states she is unable to tolerate anything p.o..  Patient was seen in the ED on 02/01/2025 she underwent CT abdomen at that time that was negative for any acute findings and was discharged with IV antiemetics.  She states her symptoms have persisted and she is feeling worse prompting her to returned to the ED. patient reports fevers, chills and denies chest pain or shortness of breath.    ED workup significant for hyperglycemia, elevated WBC 14 and UA with hematuria, 1+ protein and many bacteria.  Creatinine 1.5 elevated from baseline 0.9.  CT abdomen performed today which showed pyelonephritis.  Patient received IV fluids, antiemetics and was started on IV ceftriaxone.  She was referred to Hospital Medicine and will be admitted for further evaluation and management.

## 2025-02-08 NOTE — H&P
[facility], [place] [facility]  History & Physical    Patient Name: Annie Matthews  MRN: [id number]  Patient Class: OP- Observation  Admission Date: 2/7/2025  Attending Physician: Noelle Yap MD   Primary Care Provider: Paulie Pedersen MD         Patient information was obtained from patient, past medical records, and ER records.     Subjective:     Principal Problem:Pyelonephritis    Chief Complaint:   Chief Complaint   Patient presents with    Abdominal Pain     Lower abd pain radiating to side, +n/v, denies diarrhea, slight constipation. Seen here 1 week ago states no improvement        HPI: Annie Matthews is a 69 year old female with a past medical history of HTN, diabetes and hyperlipidemia who presents with worsening abdominal pain that radiates to her left flank with associated nausea and vomiting.  She states she is unable to tolerate anything p.o..  Patient was seen in the ED on 02/01/2025 she underwent CT abdomen at that time that was negative for any acute findings and was discharged with IV antiemetics.  She states her symptoms have persisted and she is feeling worse prompting her to returned to the ED. patient reports fevers, chills and denies chest pain or shortness of breath.    ED workup significant for hyperglycemia, elevated WBC 14 and UA with hematuria, 1+ protein and many bacteria.  Creatinine 1.5 elevated from baseline 0.9.  CT abdomen performed today which showed pyelonephritis.  Patient received IV fluids, antiemetics and was started on IV ceftriaxone.  She was referred to Hospital Medicine and will be admitted for further evaluation and management.    Past Medical History:   Diagnosis Date    Atrial fibrillation     Diabetes mellitus     Hypertension        Past Surgical History:   Procedure Laterality Date    HYSTERECTOMY         Review of patient's allergies indicates:   Allergen Reactions    Strawberry Hives and Itching    Sulfa (sulfonamide antibiotics)      Tomato Hives    Codeine Nausea And Vomiting       No current facility-administered medications on file prior to encounter.     Current Outpatient Medications on File Prior to Encounter   Medication Sig    aspirin (ECOTRIN) 81 MG EC tablet Take 81 mg by mouth once daily.    atorvastatin (LIPITOR) 10 MG tablet Take 10 mg by mouth once daily.    diclofenac sodium (VOLTAREN) 1 % Gel Apply 2 g topically 3 (three) times daily as needed (muscle spasm pain). Apply 2 grams to affected area 3 times daily as needed    dicyclomine (BENTYL) 20 mg tablet Take 1 tablet (20 mg total) by mouth 3 (three) times daily as needed (abdominal pain).    glipiZIDE (GLUCOTROL) 10 MG tablet Take 10 mg by mouth 2 (two) times daily before meals.    LIDOcaine (LIDODERM) 5 % Apply to affected area. Use as directed. May use 4% lidocaine patch as alternative.    lisinopril (PRINIVIL,ZESTRIL) 40 MG tablet Take 40 mg by mouth once daily.    ondansetron (ZOFRAN-ODT) 4 MG TbDL Take 1 tablet (4 mg total) by mouth every 8 (eight) hours as needed.    pantoprazole (PROTONIX) 20 MG tablet Take 2 tablets (40 mg total) by mouth once daily.    TRESIBA FLEXTOUCH U-100 100 unit/mL (3 mL) insulin pen Inject 24 Units into the skin every evening.    albuterol (PROVENTIL/VENTOLIN HFA) 90 mcg/actuation inhaler Inhale 1-2 puffs into the lungs every 6 (six) hours as needed for Wheezing or Shortness of Breath (Please dispense with spacer).    ibuprofen (ADVIL,MOTRIN) 800 MG tablet Take 1 tablet (800 mg total) by mouth 3 (three) times daily as needed for Pain.    [DISCONTINUED] carvedilol (COREG) 12.5 MG tablet Take 12.5 mg by mouth 2 (two) times daily with meals.    [DISCONTINUED] metformin (GLUCOPHAGE) 500 MG tablet Take 500 mg by mouth 2 (two) times daily with meals.    [DISCONTINUED] promethazine (PHENERGAN) 25 MG tablet Take 1 tablet (25 mg total) by mouth every 6 (six) hours as needed for Nausea.     Family History    Family history is unknown by patient.        Tobacco Use    Smoking status: Never    Smokeless tobacco: Not on file   Substance and Sexual Activity    Alcohol use: No    Drug use: No    Sexual activity: Not on file     Review of Systems   Constitutional:  Positive for fever. Negative for activity change, appetite change and chills.   HENT:  Negative for congestion, sore throat and trouble swallowing.    Eyes:  Negative for photophobia and visual disturbance.   Respiratory:  Negative for cough, chest tightness and shortness of breath.    Cardiovascular:  Negative for chest pain, palpitations and leg swelling.   Gastrointestinal:  Positive for abdominal pain, nausea and vomiting. Negative for diarrhea.   Genitourinary:  Negative for dysuria, flank pain and hematuria.   Musculoskeletal:  Negative for back pain.   Neurological:  Negative for dizziness, weakness and headaches.   Psychiatric/Behavioral:  Negative for confusion.      Objective:     Vital Signs (Most Recent):  Temp: 97.9 °F (36.6 °C) (pt eating ice) (02/07/25 2057)  Pulse: 84 (02/07/25 2057)  Resp: 18 (02/07/25 2050)  BP: (!) 150/71 (02/07/25 2057)  SpO2: 96 % (02/07/25 2057) Vital Signs (24h Range):  Temp:  [97.9 °F (36.6 °C)-99.8 °F (37.7 °C)] 97.9 °F (36.6 °C)  Pulse:  [84-97] 84  Resp:  [18-20] 18  SpO2:  [96 %-97 %] 96 %  BP: (124-150)/(58-71) 150/71     Weight: 90.7 kg (200 lb)  Body mass index is 36.58 kg/m².     Physical Exam  Vitals reviewed.   Constitutional:       Appearance: Normal appearance. She is normal weight. She is ill-appearing.   HENT:      Head: Normocephalic.      Mouth/Throat:      Mouth: Mucous membranes are dry.      Pharynx: Oropharynx is clear.   Eyes:      General: Lids are normal. Gaze aligned appropriately.      Conjunctiva/sclera: Conjunctivae normal.   Cardiovascular:      Rate and Rhythm: Regular rhythm. Tachycardia present.      Pulses: Normal pulses.      Heart sounds: Normal heart sounds.   Pulmonary:      Effort: Pulmonary effort is normal.      Breath sounds:  Normal breath sounds.   Abdominal:      General: Bowel sounds are normal.      Tenderness: There is abdominal tenderness. There is left CVA tenderness.   Musculoskeletal:         General: Normal range of motion.      Cervical back: Normal range of motion.   Skin:     General: Skin is warm and dry.   Neurological:      Mental Status: She is alert and oriented to person, place, and time. Mental status is at baseline.   Psychiatric:         Mood and Affect: Mood normal.                Significant Labs: All pertinent labs within the past 24 hours have been reviewed.  CBC:   Recent Labs   Lab 02/07/25  1550   WBC 14.09*   HGB 11.3*   HCT 34.8*        CMP:   Recent Labs   Lab 02/07/25  1550   *   K 4.4      CO2 24   *   BUN 20   CREATININE 1.5*   CALCIUM 9.1   PROT 7.2   ALBUMIN 2.5*   BILITOT 0.4   ALKPHOS 161*   AST 36   ALT 67*   ANIONGAP 9       Significant Imaging: I have reviewed all pertinent imaging results/findings within the past 24 hours.  Imaging Results              CT Abdomen Pelvis With IV Contrast NO Oral Contrast (Final result)  Result time 02/07/25 17:40:04      Final result by Perez De La Rosa MD (02/07/25 17:40:04)                   Impression:      1. Interval increased mild left perinephric stranding with new small region of left kidney parenchymal hypoattenuation.  Findings could be seen with ascending left urinary tract infection/developing pyelonephritis.  These findings are new compared to recent CT from 02/01/2025.  Correlate with clinical symptoms and urinalysis.  2. No evidence of appendicitis.  3. Postsurgical changes and additional findings as detailed above.      Electronically signed by: Perez De La Rosa MD  Date:    02/07/2025  Time:    17:40               Narrative:    EXAMINATION:  CT ABDOMEN PELVIS WITH IV CONTRAST    CLINICAL HISTORY:  RLQ abdominal pain (Age >= 14y);    TECHNIQUE:  Low dose axial images, sagittal and coronal reformations were obtained from  the lung bases to the pubic symphysis following the IV administration of 100 mL of Omnipaque 350 .  Oral contrast was not given.    COMPARISON:  Recent CT abdomen and pelvis from 02/01/2025.    FINDINGS:  The visualized portion of the heart is unremarkable.  The lung bases are clear.    No significant hepatic abnormalities are identified.  There is no intra-or extrahepatic biliary ductal dilatation.  The gallbladder is unremarkable.  The stomach, pancreas, spleen, and adrenal glands are unremarkable.  Small accessory splenule is noted.    There is mild asymmetric left perinephric stranding which is increased from recent CT.  Small focus of parenchymal hypoattenuation is seen in the superior aspect of the left kidney which was not evident on recent CT.  Kidneys otherwise enhance normally.  No abnormalities are seen along the ureteral courses.  Urinary bladder is nondistended.  Uterus has been removed.    Appendix is visualized and is unremarkable.  The visualized loops of small and large bowel show no evidence of obstruction or inflammation.  There is mild sigmoid diverticulosis.  No free air or free fluid.    Aorta tapers normally.    No acute osseous abnormality identified.  Mild degenerative changes are seen in the spine, most pronounced at the L5-S1 level.  Subcutaneous soft tissues show no significant abnormalities.                                      Assessment/Plan:     * Pyelonephritis  ANN MARIE    Patient presenting with abdominal pain radiating to left flank.  UA with bacteria, protein and hematuria.  CT abdomen showed pyelonephritis.  Patient is started on IV ceftriaxone.  Also reports associated nausea, vomiting and unable to tolerate anything p.o. creatinine elevated at 1.5 with baseline 0.9 likely secondary to nausea and vomiting.    -monitor BMP and replace electrolytes as needed  -hold lisinopril for ANN MARIE and resume when clinically appropriate  -IV hydration  -clear liquids if tolerated advance diet  -prn  IV antiemetics and pain control  -continue IV ceftriaxone; follow cultures      Poorly controlled diabetes mellitus  History noted.  Most recent A1c 13.6 performed 11/06/2024.  Patient is prescribed 24 units Tresiba daily    -continue long-acting insulin at reduced dose titrate up when clinically appropriate  -low-dose SSI  -Accu-Cheks    Hypertension  Patient's blood pressure range in the last 24 hours was: BP  Min: 124/58  Max: 176/72.The patient's inpatient anti-hypertensive regimen is listed below:  Current Antihypertensives   Lisinopril    Plan  - currently holding lisinopril for elevated creatinine -will resume when clinically appropriate        VTE Risk Mitigation (From admission, onward)           Ordered     heparin (porcine) injection 5,000 Units  Every 8 hours         02/07/25 2003     IP VTE HIGH RISK PATIENT  Once         02/07/25 2003     Place sequential compression device  Until discontinued         02/07/25 2003                         On 02/07/2025, patient should be placed in hospital observation services          Mayiln Dominguez NP  Department of Hospital Medicine  [facility] ([place])

## 2025-02-08 NOTE — ED PROVIDER NOTES
Encounter Date: 2/7/2025       History     Chief Complaint   Patient presents with    Abdominal Pain     Lower abd pain radiating to side, +n/v, denies diarrhea, slight constipation. Seen here 1 week ago states no improvement     This is a pleasnt 68 yo woman presenting with persistent nausea and vomiting as well as shaking and chills despite being seen previously for a similar constellation of symptoms recently without any overt cause being identified. She notes she has had difficulty tolerating her normal medications as a result of the nausea and vomiting and has been trying to hydrate with water.  She has not checked her sugar since her previous presentation.   She has never had anything like this in the past she can recall.     The history is provided by the patient and medical records.     Review of patient's allergies indicates:   Allergen Reactions    Strawberry Hives and Itching    Sulfa (sulfonamide antibiotics)     Tomato Hives    Codeine Nausea And Vomiting     Past Medical History:   Diagnosis Date    Atrial fibrillation     Diabetes mellitus     Hypertension      Past Surgical History:   Procedure Laterality Date    HYSTERECTOMY       Family History   Family history unknown: Yes     Social History     Tobacco Use    Smoking status: Never   Substance Use Topics    Alcohol use: No    Drug use: No     Review of Systems  Constitutional-no fever + chills and fatigue  HEENT-no congestion  Eyes-no redness  Respiratory-no shortness of breath  Cardio-no chest pain  GI-- + nausea and vomiting  Endocrine-no cold intolerance  -no difficulty urinating + abdominal pain  MSK-no myalgias  Skin-no rashes  Allergy-no environmental allergy  Neurologic-, no headache  Hematology-no swollen nodes  Behavioral-no confusion   Physical Exam     Initial Vitals [02/07/25 1355]   BP Pulse Resp Temp SpO2   (!) 124/58 97 20 99.8 °F (37.7 °C) 97 %      MAP       --         Physical Exam  Constitutional: uncomfortable appearing 68 yo  woman in mild distress  Eyes: Conjunctivae normal.  ENT       Head: Normocephalic, atraumatic.       Nose: Normal external appearance        Mouth/Throat: no strigulous respirations   Hematological/Lymphatic/Immunilogical: no visible lymphadenopathy   Cardiovascular: Normal rate,   Respiratory: Normal respiratory effort.   Gastrointestinal: rounded, soft, diffusely tender with voluntary guarding in the RLW  Musculoskeletal: Normal range of motion in all extremities. No obvious deformities or swelling.  Neurologic: Alert, oriented. Normal speech and language. No gross focal neurologic deficits are appreciated.  Skin: Skin is warm, dry. No rash noted.  Psychiatric: Mood and affect are normal.    ED Course   Procedures  Labs Reviewed   CBC W/ AUTO DIFFERENTIAL - Abnormal       Result Value    WBC 14.09 (*)     RBC 4.05      Hemoglobin 11.3 (*)     Hematocrit 34.8 (*)     MCV 86      MCH 27.9      MCHC 32.5      RDW 13.3      Platelets 276      MPV 10.3      Immature Granulocytes 0.6 (*)     Gran # (ANC) 10.7 (*)     Immature Grans (Abs) 0.09 (*)     Lymph # 1.9      Mono # 1.4 (*)     Eos # 0.0      Baso # 0.03      nRBC 0      Gran % 76.2 (*)     Lymph % 13.2 (*)     Mono % 9.6      Eosinophil % 0.2      Basophil % 0.2      Differential Method Automated     COMPREHENSIVE METABOLIC PANEL - Abnormal    Sodium 135 (*)     Potassium 4.4      Chloride 102      CO2 24      Glucose 421 (*)     BUN 20      Creatinine 1.5 (*)     Calcium 9.1      Total Protein 7.2      Albumin 2.5 (*)     Total Bilirubin 0.4      Alkaline Phosphatase 161 (*)     AST 36      ALT 67 (*)     eGFR 38 (*)     Anion Gap 9     URINALYSIS, REFLEX TO URINE CULTURE - Abnormal    Specimen UA Urine, Clean Catch      Color, UA Yellow      Appearance, UA Cloudy (*)     pH, UA 6.0      Specific Gravity, UA 1.030      Protein, UA 1+ (*)     Glucose, UA 4+ (*)     Ketones, UA 2+ (*)     Bilirubin (UA) Negative      Occult Blood UA 3+ (*)     Nitrite, UA  Negative      Urobilinogen, UA Negative      Leukocytes, UA 2+ (*)     Narrative:     Specimen Source->Urine   BETA - HYDROXYBUTYRATE, SERUM - Abnormal    Beta-Hydroxybutyrate 0.9 (*)    URINALYSIS MICROSCOPIC - Abnormal    RBC, UA 10 (*)     WBC, UA 45 (*)     WBC Clumps, UA Occasional (*)     Bacteria Many (*)     Yeast, UA Rare (*)     Squam Epithel, UA 5      Hyaline Casts, UA 0      Microscopic Comment SEE COMMENT      Narrative:     Specimen Source->Urine   ISTAT PROCEDURE - Abnormal    POC PH 7.424      POC PCO2 36.2      POC PO2 43      POC HCO3 23.7 (*)     POC BE -1      POC SATURATED O2 80      POC TCO2 25      Sample VENOUS     POCT GLUCOSE - Abnormal    POCT Glucose 359 (*)    POCT GLUCOSE - Abnormal    POCT Glucose 273 (*)    CULTURE, URINE   CLOSTRIDIUM DIFFICILE   LIPASE    Lipase 23     MAGNESIUM    Magnesium 2.2     LACTIC ACID, PLASMA    Lactate (Lactic Acid) 1.0     ISTAT CREATININE    POC Creatinine 0.8      Sample VENOUS            Imaging Results              CT Abdomen Pelvis With IV Contrast NO Oral Contrast (Final result)  Result time 02/07/25 17:40:04      Final result by Perez De La Rosa MD (02/07/25 17:40:04)                   Impression:      1. Interval increased mild left perinephric stranding with new small region of left kidney parenchymal hypoattenuation.  Findings could be seen with ascending left urinary tract infection/developing pyelonephritis.  These findings are new compared to recent CT from 02/01/2025.  Correlate with clinical symptoms and urinalysis.  2. No evidence of appendicitis.  3. Postsurgical changes and additional findings as detailed above.      Electronically signed by: Perez De La Rosa MD  Date:    02/07/2025  Time:    17:40               Narrative:    EXAMINATION:  CT ABDOMEN PELVIS WITH IV CONTRAST    CLINICAL HISTORY:  RLQ abdominal pain (Age >= 14y);    TECHNIQUE:  Low dose axial images, sagittal and coronal reformations were obtained from the lung bases to  the pubic symphysis following the IV administration of 100 mL of Omnipaque 350 .  Oral contrast was not given.    COMPARISON:  Recent CT abdomen and pelvis from 02/01/2025.    FINDINGS:  The visualized portion of the heart is unremarkable.  The lung bases are clear.    No significant hepatic abnormalities are identified.  There is no intra-or extrahepatic biliary ductal dilatation.  The gallbladder is unremarkable.  The stomach, pancreas, spleen, and adrenal glands are unremarkable.  Small accessory splenule is noted.    There is mild asymmetric left perinephric stranding which is increased from recent CT.  Small focus of parenchymal hypoattenuation is seen in the superior aspect of the left kidney which was not evident on recent CT.  Kidneys otherwise enhance normally.  No abnormalities are seen along the ureteral courses.  Urinary bladder is nondistended.  Uterus has been removed.    Appendix is visualized and is unremarkable.  The visualized loops of small and large bowel show no evidence of obstruction or inflammation.  There is mild sigmoid diverticulosis.  No free air or free fluid.    Aorta tapers normally.    No acute osseous abnormality identified.  Mild degenerative changes are seen in the spine, most pronounced at the L5-S1 level.  Subcutaneous soft tissues show no significant abnormalities.                                       Medications   aspirin EC tablet 81 mg (81 mg Oral Given 2/8/25 0817)   atorvastatin tablet 10 mg (10 mg Oral Given 2/8/25 0817)   LIDOcaine 5 % patch 1 patch (1 patch Transdermal Patch Applied 2/8/25 0818)   sodium chloride 0.9% flush 10 mL (has no administration in time range)   albuterol-ipratropium 2.5 mg-0.5 mg/3 mL nebulizer solution 3 mL (has no administration in time range)   melatonin tablet 6 mg (has no administration in time range)   ondansetron injection 4 mg (4 mg Intravenous Given 2/8/25 6631)   prochlorperazine injection Soln 5 mg (0 mg Intravenous Return to Cabinet  2/8/25 0758)   acetaminophen tablet 650 mg (650 mg Oral Given 2/8/25 0817)   naloxone 0.4 mg/mL injection 0.02 mg (has no administration in time range)   heparin (porcine) injection 5,000 Units (5,000 Units Subcutaneous Given 2/8/25 1347)   pantoprazole injection 40 mg (40 mg Intravenous Given 2/8/25 0817)   cefTRIAXone injection 2 g (has no administration in time range)   morphine injection 2 mg (2 mg Intravenous Given 2/8/25 1332)   morphine injection 4 mg (4 mg Intravenous Given 2/8/25 0107)   insulin aspart U-100 pen 0-10 Units (4 Units Subcutaneous Given 2/8/25 0636)   dextrose 50% injection 12.5 g (has no administration in time range)   glucagon (human recombinant) injection 1 mg (has no administration in time range)   polyethylene glycol packet 17 g (17 g Oral Given 2/8/25 1339)   insulin glargine U-100 (Lantus) pen 14 Units (has no administration in time range)   glucose chewable tablet 16 g (has no administration in time range)   glucose chewable tablet 24 g (has no administration in time range)   insulin aspart U-100 pen 3 Units (3 Units Subcutaneous Given 2/8/25 1338)   lisinopriL tablet 40 mg (40 mg Oral Given 2/8/25 1338)   potassium, sodium phosphates 280-160-250 mg packet 2 packet (2 packets Oral Given 2/8/25 1338)   0.9% NaCl infusion (has no administration in time range)   sodium chloride 0.9% bolus 1,000 mL 1,000 mL (0 mLs Intravenous Stopped 2/7/25 2000)   morphine injection 4 mg (4 mg Intravenous Given 2/7/25 1601)   ondansetron injection 4 mg (4 mg Intravenous Given 2/7/25 1601)   insulin regular injection 4 Units 0.04 mL (4 Units Intravenous Given 2/7/25 1754)   sodium chloride 0.9% bolus 1,000 mL 1,000 mL (0 mLs Intravenous Stopped 2/7/25 2037)   iohexoL (OMNIPAQUE 350) injection 100 mL (100 mLs Intravenous Given 2/7/25 1711)   cefTRIAXone injection 2 g (2 g Intravenous Given 2/7/25 1811)   promethazine (PHENERGAN) 12.5 mg in 0.9% NaCl 50 mL IVPB (0 mg Intravenous Stopped 2/7/25 2000)      Medical Decision Making  Abdominal pain represents a profoundly broad differential, this patient's symptoms are nondescript in nature and while unlikely could represent-  Pancreatitis, cholecystitis, appendicitis, gastritis, cystitis, pyleonephritis, PUD, obstructions constipation neoplasm among a myriad of other diagnoses.     A thorough examination and history was undertaken and appropriate diagnostics ordered with a diagnosis identified as below based on summative findings.   Because the broad differential in potential for changes in symptoms and discussion was also undertaken related to the importance of follow-up return or acknowledgement of new or changes in symptoms.       Problems Addressed:  Fatigue, unspecified type: acute illness or injury  Hyperglycemia: chronic illness or injury with exacerbation, progression, or side effects of treatment  Hypertension, unspecified type: chronic illness or injury with exacerbation, progression, or side effects of treatment  Pyelonephritis: acute illness or injury  Right lower quadrant abdominal pain: acute illness or injury  Vomiting, unspecified vomiting type, unspecified whether nausea present: acute illness or injury    Amount and/or Complexity of Data Reviewed  External Data Reviewed: labs, radiology and notes.     Details: Recent evaluation with nausea vomiting and hyperglcyemia   Labs: ordered. Decision-making details documented in ED Course.  Radiology: ordered and independent interpretation performed. Decision-making details documented in ED Course.    Risk  OTC drugs.  Prescription drug management.  Parenteral controlled substances.  Drug therapy requiring intensive monitoring for toxicity.  Decision regarding hospitalization.  Diagnosis or treatment significantly limited by social determinants of health.                                      Clinical Impression:  Final diagnoses:  [N12] Pyelonephritis  [R73.9] Hyperglycemia  [R10.31] Right lower quadrant  abdominal pain  [R53.83] Fatigue, unspecified type  [R11.10] Vomiting, unspecified vomiting type, unspecified whether nausea present  [I10] Hypertension, unspecified type          ED Disposition Condition    Observation Stable                Naseem Rodriguez MD  02/08/25 0107

## 2025-02-08 NOTE — PROGRESS NOTES
[facility] Medicine  Progress Note    Patient Name: Annie Matthews  MRN: [id number]  Patient Class: OP- Observation   Admission Date: 2/7/2025  Length of Stay: 0 days  Attending Physician: Noelle Yap MD  Primary Care Provider: Paulie Pedersen MD        Subjective     Principal Problem:Pyelonephritis        HPI:  Annie Matthews is a 69 year old female with a past medical history of HTN, diabetes and hyperlipidemia who presents with worsening abdominal pain that radiates to her left flank with associated nausea and vomiting.  She states she is unable to tolerate anything p.o..  Patient was seen in the ED on 02/01/2025 she underwent CT abdomen at that time that was negative for any acute findings and was discharged with IV antiemetics.  She states her symptoms have persisted and she is feeling worse prompting her to returned to the ED. patient reports fevers, chills and denies chest pain or shortness of breath.    ED workup significant for hyperglycemia, elevated WBC 14 and UA with hematuria, 1+ protein and many bacteria.  Creatinine 1.5 elevated from baseline 0.9.  CT abdomen performed today which showed pyelonephritis.  Patient received IV fluids, antiemetics and was started on IV ceftriaxone.  She was referred to Hospital Medicine and will be admitted for further evaluation and management.    Overview/Hospital Course:  No notes on file    Interval History:  Patient tolerated liquid diet and requested to have her diet advanced to full solids.  She has been having limited intake for the past 2 weeks due to abdominal pain, nausea and vomiting.  She reports she is feeling better today.  Discussed her high glucose and high A1c.  She feels that her tresiba is the cause of her stomach issues and makes her feel sick.    Review of Systems   Constitutional:  Negative for activity change, appetite change, chills and fever.   HENT:  Negative for congestion, sore throat and trouble  swallowing.    Eyes:  Negative for photophobia and visual disturbance.   Respiratory:  Negative for cough, chest tightness and shortness of breath.    Cardiovascular:  Negative for chest pain, palpitations and leg swelling.   Gastrointestinal:  Positive for abdominal pain. Negative for diarrhea, nausea and vomiting.   Genitourinary:  Negative for dysuria, flank pain and hematuria.   Musculoskeletal:  Negative for back pain.   Neurological:  Negative for dizziness, weakness and headaches.   Psychiatric/Behavioral:  Negative for confusion.      Objective:     Vital Signs (Most Recent):  Temp: 98.8 °F (37.1 °C) (02/08/25 1208)  Pulse: 86 (02/08/25 1208)  Resp: (!) 22 (02/08/25 1332)  BP: (!) 144/88 (02/08/25 1208)  SpO2: (!) 93 % (02/08/25 1208) Vital Signs (24h Range):  Temp:  [97.9 °F (36.6 °C)-100.9 °F (38.3 °C)] 98.8 °F (37.1 °C)  Pulse:  [] 86  Resp:  [18-22] 22  SpO2:  [93 %-98 %] 93 %  BP: (143-178)/(71-88) 144/88     Weight: 92.4 kg (203 lb 11.3 oz)  Body mass index is 37.26 kg/m².    Intake/Output Summary (Last 24 hours) at 2/8/2025 1453  Last data filed at 2/8/2025 0720  Gross per 24 hour   Intake 2775.21 ml   Output 301 ml   Net 2474.21 ml         Physical Exam  Vitals and nursing note reviewed.   Constitutional:       Appearance: Normal appearance. She is normal weight. She is not ill-appearing.   HENT:      Head: Normocephalic.      Mouth/Throat:      Mouth: Mucous membranes are dry.      Pharynx: Oropharynx is clear.   Eyes:      General: Lids are normal. Gaze aligned appropriately.      Conjunctiva/sclera: Conjunctivae normal.   Cardiovascular:      Rate and Rhythm: Regular rhythm. Tachycardia present.      Pulses: Normal pulses.      Heart sounds: Normal heart sounds.   Pulmonary:      Effort: Pulmonary effort is normal.      Breath sounds: Normal breath sounds.   Abdominal:      General: Bowel sounds are normal.      Tenderness: There is no abdominal tenderness. There is no left CVA tenderness.    Musculoskeletal:         General: Normal range of motion.      Cervical back: Normal range of motion.   Skin:     General: Skin is warm and dry.   Neurological:      Mental Status: She is alert and oriented to person, place, and time. Mental status is at baseline.   Psychiatric:         Mood and Affect: Mood normal.             Significant Labs: All pertinent labs within the past 24 hours have been reviewed.  BMP:   Recent Labs   Lab 02/08/25  0655   *      K 4.3      CO2 22*   BUN 15   CREATININE 1.1   CALCIUM 8.8   MG 2.2     CBC:   Recent Labs   Lab 02/07/25  1550 02/08/25  0655   WBC 14.09* 13.08*   HGB 11.3* 10.8*   HCT 34.8* 33.0*    271     CMP:   Recent Labs   Lab 02/07/25  1550 02/08/25  0655   * 139   K 4.4 4.3    106   CO2 24 22*   * 236*   BUN 20 15   CREATININE 1.5* 1.1   CALCIUM 9.1 8.8   PROT 7.2 7.0   ALBUMIN 2.5* 2.3*   BILITOT 0.4 0.3   ALKPHOS 161* 146   AST 36 16   ALT 67* 50*   ANIONGAP 9 11       Significant Imaging: I have reviewed all pertinent imaging results/findings within the past 24 hours.    Assessment and Plan     * Pyelonephritis  ANN MARIE    Patient presenting with abdominal pain radiating to left flank.  UA with bacteria, protein and hematuria.  CT abdomen showed pyelonephritis.  Patient is started on IV ceftriaxone.  Also reports associated nausea, vomiting and unable to tolerate anything p.o. creatinine elevated at 1.5 with baseline 0.9 likely secondary to nausea and vomiting.    -monitor BMP and replace electrolytes as needed  -hold lisinopril for ANN MARIE and resume when clinically appropriate  -IV hydration  -clear liquids if tolerated advance diet  -prn IV antiemetics and pain control  -continue IV ceftriaxone; follow cultures      Poorly controlled diabetes mellitus  History noted.  Most recent A1c 13.6 performed 11/06/2024.  Patient is prescribed 24 units Tresiba daily    -continue long-acting insulin at reduced dose titrate up when  clinically appropriate  -low-dose SSI  -Accu-Cheks  -add prandial aspart/discussed A1c and elevated sugar with patient.  Discussed gastroparesis.  Patient will need outpatient follow-up with diabetic educator    Hypertension  Patient's blood pressure range in the last 24 hours was: BP  Min: 124/58  Max: 176/72.The patient's inpatient anti-hypertensive regimen is listed below:  Current Antihypertensives   Lisinopril    Plan  - currently holding lisinopril for elevated creatinine   -resume 2/8      VTE Risk Mitigation (From admission, onward)           Ordered     heparin (porcine) injection 5,000 Units  Every 8 hours         02/07/25 2003     IP VTE HIGH RISK PATIENT  Once         02/07/25 2003     Place sequential compression device  Until discontinued         02/07/25 2003                    Discharge Planning   TRI:      Code Status: Full Code   Medical Readiness for Discharge Date:   Discharge Plan A: Home                        Torie Brunner DNP  Department of Hospital Medicine   [facility] ([place])

## 2025-02-08 NOTE — PLAN OF CARE
02/08/25 0752   NEFF Message   Medicare Outpatient and Observation Notification regarding financial responsibility Given to patient/caregiver;Explained to patient/caregiver;Signed/date by patient/caregiver   Date NEFF was signed 02/08/25   Time NEFF was signed 0752     Verbal Consent

## 2025-02-08 NOTE — SUBJECTIVE & OBJECTIVE
Past Medical History:   Diagnosis Date    Atrial fibrillation     Diabetes mellitus     Hypertension        Past Surgical History:   Procedure Laterality Date    HYSTERECTOMY         Review of patient's allergies indicates:   Allergen Reactions    Strawberry Hives and Itching    Sulfa (sulfonamide antibiotics)     Tomato Hives    Codeine Nausea And Vomiting       No current facility-administered medications on file prior to encounter.     Current Outpatient Medications on File Prior to Encounter   Medication Sig    aspirin (ECOTRIN) 81 MG EC tablet Take 81 mg by mouth once daily.    atorvastatin (LIPITOR) 10 MG tablet Take 10 mg by mouth once daily.    diclofenac sodium (VOLTAREN) 1 % Gel Apply 2 g topically 3 (three) times daily as needed (muscle spasm pain). Apply 2 grams to affected area 3 times daily as needed    dicyclomine (BENTYL) 20 mg tablet Take 1 tablet (20 mg total) by mouth 3 (three) times daily as needed (abdominal pain).    glipiZIDE (GLUCOTROL) 10 MG tablet Take 10 mg by mouth 2 (two) times daily before meals.    LIDOcaine (LIDODERM) 5 % Apply to affected area. Use as directed. May use 4% lidocaine patch as alternative.    lisinopril (PRINIVIL,ZESTRIL) 40 MG tablet Take 40 mg by mouth once daily.    ondansetron (ZOFRAN-ODT) 4 MG TbDL Take 1 tablet (4 mg total) by mouth every 8 (eight) hours as needed.    pantoprazole (PROTONIX) 20 MG tablet Take 2 tablets (40 mg total) by mouth once daily.    TRESIBA FLEXTOUCH U-100 100 unit/mL (3 mL) insulin pen Inject 24 Units into the skin every evening.    albuterol (PROVENTIL/VENTOLIN HFA) 90 mcg/actuation inhaler Inhale 1-2 puffs into the lungs every 6 (six) hours as needed for Wheezing or Shortness of Breath (Please dispense with spacer).    ibuprofen (ADVIL,MOTRIN) 800 MG tablet Take 1 tablet (800 mg total) by mouth 3 (three) times daily as needed for Pain.    [DISCONTINUED] carvedilol (COREG) 12.5 MG tablet Take 12.5 mg by mouth 2 (two) times daily with  meals.    [DISCONTINUED] metformin (GLUCOPHAGE) 500 MG tablet Take 500 mg by mouth 2 (two) times daily with meals.    [DISCONTINUED] promethazine (PHENERGAN) 25 MG tablet Take 1 tablet (25 mg total) by mouth every 6 (six) hours as needed for Nausea.     Family History    Family history is unknown by patient.       Tobacco Use    Smoking status: Never    Smokeless tobacco: Not on file   Substance and Sexual Activity    Alcohol use: No    Drug use: No    Sexual activity: Not on file     Review of Systems   Constitutional:  Positive for fever. Negative for activity change, appetite change and chills.   HENT:  Negative for congestion, sore throat and trouble swallowing.    Eyes:  Negative for photophobia and visual disturbance.   Respiratory:  Negative for cough, chest tightness and shortness of breath.    Cardiovascular:  Negative for chest pain, palpitations and leg swelling.   Gastrointestinal:  Positive for abdominal pain, nausea and vomiting. Negative for diarrhea.   Genitourinary:  Negative for dysuria, flank pain and hematuria.   Musculoskeletal:  Negative for back pain.   Neurological:  Negative for dizziness, weakness and headaches.   Psychiatric/Behavioral:  Negative for confusion.      Objective:     Vital Signs (Most Recent):  Temp: 97.9 °F (36.6 °C) (pt eating ice) (02/07/25 2057)  Pulse: 84 (02/07/25 2057)  Resp: 18 (02/07/25 2050)  BP: (!) 150/71 (02/07/25 2057)  SpO2: 96 % (02/07/25 2057) Vital Signs (24h Range):  Temp:  [97.9 °F (36.6 °C)-99.8 °F (37.7 °C)] 97.9 °F (36.6 °C)  Pulse:  [84-97] 84  Resp:  [18-20] 18  SpO2:  [96 %-97 %] 96 %  BP: (124-150)/(58-71) 150/71     Weight: 90.7 kg (200 lb)  Body mass index is 36.58 kg/m².     Physical Exam  Vitals reviewed.   Constitutional:       Appearance: Normal appearance. She is normal weight. She is ill-appearing.   HENT:      Head: Normocephalic.      Mouth/Throat:      Mouth: Mucous membranes are dry.      Pharynx: Oropharynx is clear.   Eyes:       General: Lids are normal. Gaze aligned appropriately.      Conjunctiva/sclera: Conjunctivae normal.   Cardiovascular:      Rate and Rhythm: Regular rhythm. Tachycardia present.      Pulses: Normal pulses.      Heart sounds: Normal heart sounds.   Pulmonary:      Effort: Pulmonary effort is normal.      Breath sounds: Normal breath sounds.   Abdominal:      General: Bowel sounds are normal.      Tenderness: There is abdominal tenderness. There is left CVA tenderness.   Musculoskeletal:         General: Normal range of motion.      Cervical back: Normal range of motion.   Skin:     General: Skin is warm and dry.   Neurological:      Mental Status: She is alert and oriented to person, place, and time. Mental status is at baseline.   Psychiatric:         Mood and Affect: Mood normal.                Significant Labs: All pertinent labs within the past 24 hours have been reviewed.  CBC:   Recent Labs   Lab 02/07/25  1550   WBC 14.09*   HGB 11.3*   HCT 34.8*        CMP:   Recent Labs   Lab 02/07/25  1550   *   K 4.4      CO2 24   *   BUN 20   CREATININE 1.5*   CALCIUM 9.1   PROT 7.2   ALBUMIN 2.5*   BILITOT 0.4   ALKPHOS 161*   AST 36   ALT 67*   ANIONGAP 9       Significant Imaging: I have reviewed all pertinent imaging results/findings within the past 24 hours.  Imaging Results              CT Abdomen Pelvis With IV Contrast NO Oral Contrast (Final result)  Result time 02/07/25 17:40:04      Final result by Perez De La Rosa MD (02/07/25 17:40:04)                   Impression:      1. Interval increased mild left perinephric stranding with new small region of left kidney parenchymal hypoattenuation.  Findings could be seen with ascending left urinary tract infection/developing pyelonephritis.  These findings are new compared to recent CT from 02/01/2025.  Correlate with clinical symptoms and urinalysis.  2. No evidence of appendicitis.  3. Postsurgical changes and additional findings as  detailed above.      Electronically signed by: Perez De La Rosa MD  Date:    02/07/2025  Time:    17:40               Narrative:    EXAMINATION:  CT ABDOMEN PELVIS WITH IV CONTRAST    CLINICAL HISTORY:  RLQ abdominal pain (Age >= 14y);    TECHNIQUE:  Low dose axial images, sagittal and coronal reformations were obtained from the lung bases to the pubic symphysis following the IV administration of 100 mL of Omnipaque 350 .  Oral contrast was not given.    COMPARISON:  Recent CT abdomen and pelvis from 02/01/2025.    FINDINGS:  The visualized portion of the heart is unremarkable.  The lung bases are clear.    No significant hepatic abnormalities are identified.  There is no intra-or extrahepatic biliary ductal dilatation.  The gallbladder is unremarkable.  The stomach, pancreas, spleen, and adrenal glands are unremarkable.  Small accessory splenule is noted.    There is mild asymmetric left perinephric stranding which is increased from recent CT.  Small focus of parenchymal hypoattenuation is seen in the superior aspect of the left kidney which was not evident on recent CT.  Kidneys otherwise enhance normally.  No abnormalities are seen along the ureteral courses.  Urinary bladder is nondistended.  Uterus has been removed.    Appendix is visualized and is unremarkable.  The visualized loops of small and large bowel show no evidence of obstruction or inflammation.  There is mild sigmoid diverticulosis.  No free air or free fluid.    Aorta tapers normally.    No acute osseous abnormality identified.  Mild degenerative changes are seen in the spine, most pronounced at the L5-S1 level.  Subcutaneous soft tissues show no significant abnormalities.

## 2025-02-08 NOTE — ASSESSMENT & PLAN NOTE
Patient presenting with abdominal pain radiating to left flank.  UA with bacteria, protein and hematuria.  CT abdomen showed pyelonephritis.  Patient is started on IV ceftriaxone.  Also reports associated nausea, vomiting and unable to tolerate anything p.o..    -IV hydration  -clear liquids if tolerated advance diet  -prn IV antiemetics and pain control  -continue IV ceftriaxone; follow cultures

## 2025-02-08 NOTE — SUBJECTIVE & OBJECTIVE
Interval History:  Patient tolerated liquid diet and requested to have her diet advanced to full solids.  She has been having limited intake for the past 2 weeks due to abdominal pain, nausea and vomiting.  She reports she is feeling better today.  Discussed her high glucose and high A1c.  She feels that her tresiba is the cause of her stomach issues and makes her feel sick.    Review of Systems   Constitutional:  Negative for activity change, appetite change, chills and fever.   HENT:  Negative for congestion, sore throat and trouble swallowing.    Eyes:  Negative for photophobia and visual disturbance.   Respiratory:  Negative for cough, chest tightness and shortness of breath.    Cardiovascular:  Negative for chest pain, palpitations and leg swelling.   Gastrointestinal:  Positive for abdominal pain. Negative for diarrhea, nausea and vomiting.   Genitourinary:  Negative for dysuria, flank pain and hematuria.   Musculoskeletal:  Negative for back pain.   Neurological:  Negative for dizziness, weakness and headaches.   Psychiatric/Behavioral:  Negative for confusion.      Objective:     Vital Signs (Most Recent):  Temp: 98.8 °F (37.1 °C) (02/08/25 1208)  Pulse: 86 (02/08/25 1208)  Resp: (!) 22 (02/08/25 1332)  BP: (!) 144/88 (02/08/25 1208)  SpO2: (!) 93 % (02/08/25 1208) Vital Signs (24h Range):  Temp:  [97.9 °F (36.6 °C)-100.9 °F (38.3 °C)] 98.8 °F (37.1 °C)  Pulse:  [] 86  Resp:  [18-22] 22  SpO2:  [93 %-98 %] 93 %  BP: (143-178)/(71-88) 144/88     Weight: 92.4 kg (203 lb 11.3 oz)  Body mass index is 37.26 kg/m².    Intake/Output Summary (Last 24 hours) at 2/8/2025 1453  Last data filed at 2/8/2025 0720  Gross per 24 hour   Intake 2775.21 ml   Output 301 ml   Net 2474.21 ml         Physical Exam  Vitals and nursing note reviewed.   Constitutional:       Appearance: Normal appearance. She is normal weight. She is not ill-appearing.   HENT:      Head: Normocephalic.      Mouth/Throat:      Mouth: Mucous  membranes are dry.      Pharynx: Oropharynx is clear.   Eyes:      General: Lids are normal. Gaze aligned appropriately.      Conjunctiva/sclera: Conjunctivae normal.   Cardiovascular:      Rate and Rhythm: Regular rhythm. Tachycardia present.      Pulses: Normal pulses.      Heart sounds: Normal heart sounds.   Pulmonary:      Effort: Pulmonary effort is normal.      Breath sounds: Normal breath sounds.   Abdominal:      General: Bowel sounds are normal.      Tenderness: There is no abdominal tenderness. There is no left CVA tenderness.   Musculoskeletal:         General: Normal range of motion.      Cervical back: Normal range of motion.   Skin:     General: Skin is warm and dry.   Neurological:      Mental Status: She is alert and oriented to person, place, and time. Mental status is at baseline.   Psychiatric:         Mood and Affect: Mood normal.             Significant Labs: All pertinent labs within the past 24 hours have been reviewed.  BMP:   Recent Labs   Lab 02/08/25  0655   *      K 4.3      CO2 22*   BUN 15   CREATININE 1.1   CALCIUM 8.8   MG 2.2     CBC:   Recent Labs   Lab 02/07/25  1550 02/08/25  0655   WBC 14.09* 13.08*   HGB 11.3* 10.8*   HCT 34.8* 33.0*    271     CMP:   Recent Labs   Lab 02/07/25  1550 02/08/25  0655   * 139   K 4.4 4.3    106   CO2 24 22*   * 236*   BUN 20 15   CREATININE 1.5* 1.1   CALCIUM 9.1 8.8   PROT 7.2 7.0   ALBUMIN 2.5* 2.3*   BILITOT 0.4 0.3   ALKPHOS 161* 146   AST 36 16   ALT 67* 50*   ANIONGAP 9 11       Significant Imaging: I have reviewed all pertinent imaging results/findings within the past 24 hours.

## 2025-02-08 NOTE — ASSESSMENT & PLAN NOTE
ANN MARIE    Patient presenting with abdominal pain radiating to left flank.  UA with bacteria, protein and hematuria.  CT abdomen showed pyelonephritis.  Patient is started on IV ceftriaxone.  Also reports associated nausea, vomiting and unable to tolerate anything p.o. creatinine elevated at 1.5 with baseline 0.9 likely secondary to nausea and vomiting.    -monitor BMP and replace electrolytes as needed  -hold lisinopril for ANN MARIE and resume when clinically appropriate  -IV hydration  -clear liquids if tolerated advance diet  -prn IV antiemetics and pain control  -continue IV ceftriaxone; follow cultures

## 2025-02-08 NOTE — ASSESSMENT & PLAN NOTE
Patient's blood pressure range in the last 24 hours was: BP  Min: 124/58  Max: 150/71.The patient's inpatient anti-hypertensive regimen is listed below:  Current Antihypertensives  lisinopriL tablet 40 mg, Daily, Oral    Plan  - BP is controlled, no changes needed to their regimen

## 2025-02-08 NOTE — ASSESSMENT & PLAN NOTE
History noted.  Most recent A1c 13.6 performed 11/06/2024.  Patient is prescribed 24 units Tresiba daily    -continue long-acting insulin at reduced dose titrate up when clinically appropriate  -low-dose SSI  -Accu-Cheks

## 2025-02-08 NOTE — ASSESSMENT & PLAN NOTE
Patient's blood pressure range in the last 24 hours was: BP  Min: 124/58  Max: 176/72.The patient's inpatient anti-hypertensive regimen is listed below:  Current Antihypertensives   Lisinopril    Plan  - currently holding lisinopril for elevated creatinine   -resume 2/8

## 2025-02-09 PROBLEM — R10.9 ABDOMINAL PAIN: Status: ACTIVE | Noted: 2025-02-09

## 2025-02-09 LAB
ANION GAP SERPL CALC-SCNC: 9 MMOL/L (ref 8–16)
BACTERIA UR CULT: ABNORMAL
BASOPHILS # BLD AUTO: 0.02 K/UL (ref 0–0.2)
BASOPHILS NFR BLD: 0.2 % (ref 0–1.9)
BUN SERPL-MCNC: 16 MG/DL (ref 8–23)
CALCIUM SERPL-MCNC: 9.1 MG/DL (ref 8.7–10.5)
CHLORIDE SERPL-SCNC: 103 MMOL/L (ref 95–110)
CO2 SERPL-SCNC: 24 MMOL/L (ref 23–29)
CREAT SERPL-MCNC: 1.2 MG/DL (ref 0.5–1.4)
DIFFERENTIAL METHOD BLD: ABNORMAL
EOSINOPHIL # BLD AUTO: 0.2 K/UL (ref 0–0.5)
EOSINOPHIL NFR BLD: 1.6 % (ref 0–8)
ERYTHROCYTE [DISTWIDTH] IN BLOOD BY AUTOMATED COUNT: 13.4 % (ref 11.5–14.5)
EST. GFR  (NO RACE VARIABLE): 50 ML/MIN/1.73 M^2
ESTIMATED AVG GLUCOSE: 355 MG/DL (ref 68–131)
GLUCOSE SERPL-MCNC: 279 MG/DL (ref 70–110)
HBA1C MFR BLD: 14 % (ref 4–5.6)
HCT VFR BLD AUTO: 34.4 % (ref 37–48.5)
HGB BLD-MCNC: 10.9 G/DL (ref 12–16)
IMM GRANULOCYTES # BLD AUTO: 0.07 K/UL (ref 0–0.04)
IMM GRANULOCYTES NFR BLD AUTO: 0.6 % (ref 0–0.5)
LYMPHOCYTES # BLD AUTO: 2.1 K/UL (ref 1–4.8)
LYMPHOCYTES NFR BLD: 18.7 % (ref 18–48)
MCH RBC QN AUTO: 27.6 PG (ref 27–31)
MCHC RBC AUTO-ENTMCNC: 31.7 G/DL (ref 32–36)
MCV RBC AUTO: 87 FL (ref 82–98)
MONOCYTES # BLD AUTO: 1.3 K/UL (ref 0.3–1)
MONOCYTES NFR BLD: 11.6 % (ref 4–15)
NEUTROPHILS # BLD AUTO: 7.4 K/UL (ref 1.8–7.7)
NEUTROPHILS NFR BLD: 67.3 % (ref 38–73)
NRBC BLD-RTO: 0 /100 WBC
PLATELET # BLD AUTO: 299 K/UL (ref 150–450)
PMV BLD AUTO: 10 FL (ref 9.2–12.9)
POCT GLUCOSE: 183 MG/DL (ref 70–110)
POCT GLUCOSE: 247 MG/DL (ref 70–110)
POCT GLUCOSE: 255 MG/DL (ref 70–110)
POCT GLUCOSE: 376 MG/DL (ref 70–110)
POTASSIUM SERPL-SCNC: 4.5 MMOL/L (ref 3.5–5.1)
RBC # BLD AUTO: 3.95 M/UL (ref 4–5.4)
SODIUM SERPL-SCNC: 136 MMOL/L (ref 136–145)
WBC # BLD AUTO: 11.01 K/UL (ref 3.9–12.7)

## 2025-02-09 PROCEDURE — 25000003 PHARM REV CODE 250: Performed by: NURSE PRACTITIONER

## 2025-02-09 PROCEDURE — 63600175 PHARM REV CODE 636 W HCPCS: Performed by: NURSE PRACTITIONER

## 2025-02-09 PROCEDURE — 85025 COMPLETE CBC W/AUTO DIFF WBC: CPT | Performed by: NURSE PRACTITIONER

## 2025-02-09 PROCEDURE — 96372 THER/PROPH/DIAG INJ SC/IM: CPT | Performed by: NURSE PRACTITIONER

## 2025-02-09 PROCEDURE — 96361 HYDRATE IV INFUSION ADD-ON: CPT

## 2025-02-09 PROCEDURE — 21400001 HC TELEMETRY ROOM

## 2025-02-09 PROCEDURE — 83036 HEMOGLOBIN GLYCOSYLATED A1C: CPT | Performed by: NURSE PRACTITIONER

## 2025-02-09 PROCEDURE — 96376 TX/PRO/DX INJ SAME DRUG ADON: CPT

## 2025-02-09 PROCEDURE — 80048 BASIC METABOLIC PNL TOTAL CA: CPT | Performed by: NURSE PRACTITIONER

## 2025-02-09 PROCEDURE — 36415 COLL VENOUS BLD VENIPUNCTURE: CPT | Performed by: NURSE PRACTITIONER

## 2025-02-09 RX ORDER — INSULIN ASPART 100 [IU]/ML
4 INJECTION, SOLUTION INTRAVENOUS; SUBCUTANEOUS
Status: DISCONTINUED | OUTPATIENT
Start: 2025-02-09 | End: 2025-02-10 | Stop reason: HOSPADM

## 2025-02-09 RX ORDER — INSULIN GLARGINE 100 [IU]/ML
16 INJECTION, SOLUTION SUBCUTANEOUS NIGHTLY
Status: DISCONTINUED | OUTPATIENT
Start: 2025-02-09 | End: 2025-02-09

## 2025-02-09 RX ORDER — INSULIN GLARGINE 100 [IU]/ML
20 INJECTION, SOLUTION SUBCUTANEOUS NIGHTLY
Status: DISCONTINUED | OUTPATIENT
Start: 2025-02-09 | End: 2025-02-10 | Stop reason: HOSPADM

## 2025-02-09 RX ORDER — KETOROLAC TROMETHAMINE 10 MG/1
10 TABLET, FILM COATED ORAL EVERY 8 HOURS PRN
Status: DISCONTINUED | OUTPATIENT
Start: 2025-02-09 | End: 2025-02-10 | Stop reason: HOSPADM

## 2025-02-09 RX ORDER — GABAPENTIN 100 MG/1
200 CAPSULE ORAL 3 TIMES DAILY
Status: DISCONTINUED | OUTPATIENT
Start: 2025-02-09 | End: 2025-02-10 | Stop reason: HOSPADM

## 2025-02-09 RX ADMIN — CEFTRIAXONE SODIUM 2 G: 2 INJECTION, POWDER, FOR SOLUTION INTRAMUSCULAR; INTRAVENOUS at 06:02

## 2025-02-09 RX ADMIN — PANTOPRAZOLE SODIUM 40 MG: 40 INJECTION, POWDER, LYOPHILIZED, FOR SOLUTION INTRAVENOUS at 08:02

## 2025-02-09 RX ADMIN — DICYCLOMINE HYDROCHLORIDE 10 MG: 10 CAPSULE ORAL at 04:02

## 2025-02-09 RX ADMIN — ACETAMINOPHEN 650 MG: 325 TABLET, FILM COATED ORAL at 10:02

## 2025-02-09 RX ADMIN — GABAPENTIN 200 MG: 100 CAPSULE ORAL at 03:02

## 2025-02-09 RX ADMIN — AMLODIPINE BESYLATE 5 MG: 5 TABLET ORAL at 08:02

## 2025-02-09 RX ADMIN — MELATONIN TAB 3 MG 6 MG: 3 TAB at 10:02

## 2025-02-09 RX ADMIN — INSULIN ASPART 2 UNITS: 100 INJECTION, SOLUTION INTRAVENOUS; SUBCUTANEOUS at 06:02

## 2025-02-09 RX ADMIN — GABAPENTIN 200 MG: 100 CAPSULE ORAL at 12:02

## 2025-02-09 RX ADMIN — HEPARIN SODIUM 5000 UNITS: 5000 INJECTION INTRAVENOUS; SUBCUTANEOUS at 09:02

## 2025-02-09 RX ADMIN — DICYCLOMINE HYDROCHLORIDE 10 MG: 10 CAPSULE ORAL at 08:02

## 2025-02-09 RX ADMIN — INSULIN ASPART 10 UNITS: 100 INJECTION, SOLUTION INTRAVENOUS; SUBCUTANEOUS at 08:02

## 2025-02-09 RX ADMIN — POLYETHYLENE GLYCOL 3350 17 G: 17 POWDER, FOR SOLUTION ORAL at 08:02

## 2025-02-09 RX ADMIN — DICYCLOMINE HYDROCHLORIDE 10 MG: 10 CAPSULE ORAL at 06:02

## 2025-02-09 RX ADMIN — ONDANSETRON 4 MG: 2 INJECTION INTRAMUSCULAR; INTRAVENOUS at 06:02

## 2025-02-09 RX ADMIN — LISINOPRIL 40 MG: 20 TABLET ORAL at 08:02

## 2025-02-09 RX ADMIN — ATORVASTATIN CALCIUM 10 MG: 10 TABLET, FILM COATED ORAL at 08:02

## 2025-02-09 RX ADMIN — ACETAMINOPHEN 650 MG: 325 TABLET, FILM COATED ORAL at 06:02

## 2025-02-09 RX ADMIN — KETOROLAC TROMETHAMINE 10 MG: 10 TABLET, FILM COATED ORAL at 12:02

## 2025-02-09 RX ADMIN — ASPIRIN 81 MG: 81 TABLET, COATED ORAL at 08:02

## 2025-02-09 RX ADMIN — HEPARIN SODIUM 5000 UNITS: 5000 INJECTION INTRAVENOUS; SUBCUTANEOUS at 06:02

## 2025-02-09 RX ADMIN — KETOROLAC TROMETHAMINE 10 MG: 10 TABLET, FILM COATED ORAL at 06:02

## 2025-02-09 RX ADMIN — INSULIN ASPART 4 UNITS: 100 INJECTION, SOLUTION INTRAVENOUS; SUBCUTANEOUS at 06:02

## 2025-02-09 RX ADMIN — INSULIN ASPART 3 UNITS: 100 INJECTION, SOLUTION INTRAVENOUS; SUBCUTANEOUS at 07:02

## 2025-02-09 RX ADMIN — LIDOCAINE 5% 1 PATCH: 700 PATCH TOPICAL at 08:02

## 2025-02-09 RX ADMIN — GABAPENTIN 200 MG: 100 CAPSULE ORAL at 08:02

## 2025-02-09 RX ADMIN — SODIUM CHLORIDE: 9 INJECTION, SOLUTION INTRAVENOUS at 08:02

## 2025-02-09 RX ADMIN — DICYCLOMINE HYDROCHLORIDE 10 MG: 10 CAPSULE ORAL at 12:02

## 2025-02-09 RX ADMIN — INSULIN GLARGINE 20 UNITS: 100 INJECTION, SOLUTION SUBCUTANEOUS at 08:02

## 2025-02-09 RX ADMIN — INSULIN ASPART 3 UNITS: 100 INJECTION, SOLUTION INTRAVENOUS; SUBCUTANEOUS at 08:02

## 2025-02-09 RX ADMIN — INSULIN ASPART 4 UNITS: 100 INJECTION, SOLUTION INTRAVENOUS; SUBCUTANEOUS at 12:02

## 2025-02-09 NOTE — PROGRESS NOTES
[facility] Surg [facility] Medicine  Progress Note    Patient Name: Annie Matthews  MRN: [id number]  Patient Class: OP- Observation   Admission Date: 2/7/2025  Length of Stay: 0 days  Attending Physician: Noelle Yap MD  Primary Care Provider: Paulie Pedersen MD        Subjective     Principal Problem:Pyelonephritis        HPI:  Annie Matthews is a 69 year old female with a past medical history of HTN, diabetes and hyperlipidemia who presents with worsening abdominal pain that radiates to her left flank with associated nausea and vomiting.  She states she is unable to tolerate anything p.o..  Patient was seen in the ED on 02/01/2025 she underwent CT abdomen at that time that was negative for any acute findings and was discharged with IV antiemetics.  She states her symptoms have persisted and she is feeling worse prompting her to returned to the ED. patient reports fevers, chills and denies chest pain or shortness of breath.    ED workup significant for hyperglycemia, elevated WBC 14 and UA with hematuria, 1+ protein and many bacteria.  Creatinine 1.5 elevated from baseline 0.9.  CT abdomen performed today which showed pyelonephritis.  Patient received IV fluids, antiemetics and was started on IV ceftriaxone.  She was referred to Hospital Medicine and will be admitted for further evaluation and management.    Overview/Hospital Course:  Ms. Chris was admitted with abdominal pain.  Pain has been present for over 2 weeks and this is her 2nd hospital visit.  On this repeat CT abdomen pelvis it was noted that she had pyelonephritis.  She was admitted and started on IV antibiotics.  Her flank pain improved but her abdominal pain continues to affect her.  Bentyl has been started along with gabapentin.     No new subjective & objective note has been filed under this hospital service since the last note was generated.      Assessment and Plan     * Pyelonephritis  ANN MARIE    Patient presenting with  abdominal pain radiating to left flank.  UA with bacteria, protein and hematuria.  CT abdomen showed pyelonephritis.  Patient is started on IV ceftriaxone.  Also reports associated nausea, vomiting and unable to tolerate anything p.o. creatinine elevated at 1.5 with baseline 0.9 likely secondary to nausea and vomiting.    -monitor BMP and replace electrolytes as needed  -hold lisinopril for ANN MARIE and resume when clinically appropriate  -IV hydration  -clear liquids if tolerated advance diet  -prn IV antiemetics and pain control  -continue IV ceftriaxone; follow cultures      Abdominal pain  Chronic    She reports that she sees Dr. Bey outpatient.  Her last GI appointment was January 29th 2024.  Per Dr. Pritchett at [facility], all of her abdominal studies including abdominal motility study were within normal limits.  She was prescribed Bentyl and Zofran.    -very likely that her uncontrolled diabetes is contributing  -A1c is 14        Poorly controlled diabetes mellitus  History noted.  Most recent A1c 13.6 performed 11/06/2024.  Patient is prescribed 24 units Tresiba daily    -continue long-acting insulin at reduced dose titrate up when clinically appropriate  -low-dose SSI  -Accu-Cheks  -add prandial aspart/discussed A1c and elevated sugar with patient.  Discussed gastroparesis.  Patient will need outpatient follow-up with diabetic educator    Hypertension  Patient's blood pressure range in the last 24 hours was: BP  Min: 136/60  Max: 210/83.The patient's inpatient anti-hypertensive regimen is listed below:  Current Antihypertensives  lisinopriL tablet 40 mg, Daily, Oral  amLODIPine tablet 5 mg, Daily, OralLisinopril    Plan  - currently holding lisinopril for elevated creatinine   -resume 2/8  -added amlodipine due to elevated pressures.  This is also noted as a home medication in a previous chart entry.      VTE Risk Mitigation (From admission, onward)           Ordered     heparin (porcine) injection 5,000 Units   Every 8 hours         02/07/25 2003     IP VTE HIGH RISK PATIENT  Once         02/07/25 2003     Place sequential compression device  Until discontinued         02/07/25 2003                    Discharge Planning   TRI:      Code Status: Full Code   Medical Readiness for Discharge Date:   Discharge Plan A: Home                        Torie Brunner DNP  Department of Hospital Medicine   [facility] ([place])

## 2025-02-09 NOTE — ASSESSMENT & PLAN NOTE
Chronic    She reports that she sees Dr. Bey outpatient.  Her last GI appointment was January 29th 2024.  Per Dr. Pritchett at [facility], all of her abdominal studies including abdominal motility study were within normal limits.  She was prescribed Bentyl and Zofran.    -very likely that her uncontrolled diabetes is contributing  -A1c is 14

## 2025-02-09 NOTE — HOSPITAL COURSE
Ms. Matthews was admitted with abdominal pain.  Pain has been present for over 2 weeks and this is her 2nd hospital visit.  On this repeat CT abdomen pelvis it was noted that she had pyelonephritis.  She was admitted and started on IV antibiotics.  Her flank pain improved but her abdominal pain continues to affect her.  Bentyl has been started along with gabapentin.  She notes that Toradol helps the abdominal pain but ibuprofen does not.  She has tolerated a regular diet.  Her glucose is improving.  On discharge day she was seen and examined.  She is tolerating a regular diet.  She does not have any aversion to taking the Lantus or aspart insulin at home.  She would like to continue her inpatient diabetes and abdominal pain regimen at home as she feels that her body is tolerating it well.  Discussed importance of not taking Toradol multiple days in a row as it can cause a GI bleed or kidney issues.  Discussed importance of monitoring her blood glucose.  Patient said she would use her dex com, and her glucometer also.  Referrals placed for endocrine, GI, and PCP providers.  Ms. Matthews will be discharged home.

## 2025-02-09 NOTE — ASSESSMENT & PLAN NOTE
Patient's blood pressure range in the last 24 hours was: BP  Min: 136/60  Max: 210/83.The patient's inpatient anti-hypertensive regimen is listed below:  Current Antihypertensives  lisinopriL tablet 40 mg, Daily, Oral  amLODIPine tablet 5 mg, Daily, OralLisinopril    Plan  - currently holding lisinopril for elevated creatinine   -resume 2/8  -added amlodipine due to elevated pressures.  This is also noted as a home medication in a previous chart entry.

## 2025-02-10 VITALS
HEIGHT: 62 IN | SYSTOLIC BLOOD PRESSURE: 112 MMHG | WEIGHT: 213.38 LBS | RESPIRATION RATE: 18 BRPM | OXYGEN SATURATION: 93 % | TEMPERATURE: 99 F | BODY MASS INDEX: 39.27 KG/M2 | HEART RATE: 76 BPM | DIASTOLIC BLOOD PRESSURE: 59 MMHG

## 2025-02-10 LAB
ANION GAP SERPL CALC-SCNC: 9 MMOL/L (ref 8–16)
BUN SERPL-MCNC: 20 MG/DL (ref 8–23)
CALCIUM SERPL-MCNC: 8.8 MG/DL (ref 8.7–10.5)
CHLORIDE SERPL-SCNC: 105 MMOL/L (ref 95–110)
CO2 SERPL-SCNC: 24 MMOL/L (ref 23–29)
CREAT SERPL-MCNC: 1.3 MG/DL (ref 0.5–1.4)
EST. GFR  (NO RACE VARIABLE): 45 ML/MIN/1.73 M^2
GLUCOSE SERPL-MCNC: 335 MG/DL (ref 70–110)
POCT GLUCOSE: 145 MG/DL (ref 70–110)
POCT GLUCOSE: 184 MG/DL (ref 70–110)
POCT GLUCOSE: 276 MG/DL (ref 70–110)
POTASSIUM SERPL-SCNC: 4.5 MMOL/L (ref 3.5–5.1)
SODIUM SERPL-SCNC: 138 MMOL/L (ref 136–145)

## 2025-02-10 PROCEDURE — 36415 COLL VENOUS BLD VENIPUNCTURE: CPT | Performed by: NURSE PRACTITIONER

## 2025-02-10 PROCEDURE — 63600175 PHARM REV CODE 636 W HCPCS: Performed by: NURSE PRACTITIONER

## 2025-02-10 PROCEDURE — 25000003 PHARM REV CODE 250: Performed by: NURSE PRACTITIONER

## 2025-02-10 PROCEDURE — 80048 BASIC METABOLIC PNL TOTAL CA: CPT | Performed by: NURSE PRACTITIONER

## 2025-02-10 RX ORDER — GLIPIZIDE 10 MG/1
10 TABLET ORAL
Qty: 30 TABLET | Refills: 1 | Status: SHIPPED | OUTPATIENT
Start: 2025-02-11 | End: 2026-02-11

## 2025-02-10 RX ORDER — GLIPIZIDE 10 MG/1
10 TABLET ORAL
Status: DISCONTINUED | OUTPATIENT
Start: 2025-02-10 | End: 2025-02-10 | Stop reason: HOSPADM

## 2025-02-10 RX ORDER — DICYCLOMINE HYDROCHLORIDE 10 MG/1
10 CAPSULE ORAL
Qty: 40 CAPSULE | Refills: 2 | Status: SHIPPED | OUTPATIENT
Start: 2025-02-10 | End: 2025-02-20

## 2025-02-10 RX ORDER — CIPROFLOXACIN 500 MG/1
500 TABLET ORAL EVERY 12 HOURS
Qty: 10 TABLET | Refills: 0 | Status: SHIPPED | OUTPATIENT
Start: 2025-02-11 | End: 2025-02-16

## 2025-02-10 RX ORDER — LIDOCAINE 50 MG/G
PATCH TOPICAL
Qty: 15 PATCH | Refills: 1 | Status: SHIPPED | OUTPATIENT
Start: 2025-02-10

## 2025-02-10 RX ORDER — KETOROLAC TROMETHAMINE 10 MG/1
10 TABLET, FILM COATED ORAL DAILY PRN
Qty: 20 TABLET | Refills: 0 | Status: SHIPPED | OUTPATIENT
Start: 2025-02-10

## 2025-02-10 RX ORDER — GABAPENTIN 100 MG/1
200 CAPSULE ORAL 3 TIMES DAILY
Qty: 90 CAPSULE | Refills: 0 | Status: SHIPPED | OUTPATIENT
Start: 2025-02-10 | End: 2025-03-12

## 2025-02-10 RX ORDER — AMLODIPINE BESYLATE 5 MG/1
5 TABLET ORAL DAILY
Qty: 90 TABLET | Refills: 0 | Status: SHIPPED | OUTPATIENT
Start: 2025-02-11 | End: 2026-02-11

## 2025-02-10 RX ORDER — INSULIN GLARGINE 100 [IU]/ML
20 INJECTION, SOLUTION SUBCUTANEOUS NIGHTLY
Qty: 15 ML | Refills: 0 | Status: SHIPPED | OUTPATIENT
Start: 2025-02-10 | End: 2026-02-10

## 2025-02-10 RX ORDER — PEN NEEDLE, DIABETIC 30 GX3/16"
1 NEEDLE, DISPOSABLE MISCELLANEOUS
Qty: 100 EACH | Refills: 1 | Status: SHIPPED | OUTPATIENT
Start: 2025-02-10

## 2025-02-10 RX ORDER — FLUCONAZOLE 150 MG/1
150 TABLET ORAL
Qty: 2 TABLET | Refills: 0 | Status: SHIPPED | OUTPATIENT
Start: 2025-02-11 | End: 2025-02-17

## 2025-02-10 RX ORDER — INSULIN ASPART 100 [IU]/ML
3 INJECTION, SOLUTION INTRAVENOUS; SUBCUTANEOUS
Qty: 15 ML | Refills: 0 | Status: SHIPPED | OUTPATIENT
Start: 2025-02-10 | End: 2026-02-10

## 2025-02-10 RX ADMIN — INSULIN ASPART 6 UNITS: 100 INJECTION, SOLUTION INTRAVENOUS; SUBCUTANEOUS at 10:02

## 2025-02-10 RX ADMIN — GLIPIZIDE 10 MG: 10 TABLET ORAL at 09:02

## 2025-02-10 RX ADMIN — INSULIN ASPART 4 UNITS: 100 INJECTION, SOLUTION INTRAVENOUS; SUBCUTANEOUS at 09:02

## 2025-02-10 RX ADMIN — AMLODIPINE BESYLATE 5 MG: 5 TABLET ORAL at 09:02

## 2025-02-10 RX ADMIN — DICYCLOMINE HYDROCHLORIDE 10 MG: 10 CAPSULE ORAL at 12:02

## 2025-02-10 RX ADMIN — INSULIN ASPART 2 UNITS: 100 INJECTION, SOLUTION INTRAVENOUS; SUBCUTANEOUS at 12:02

## 2025-02-10 RX ADMIN — KETOROLAC TROMETHAMINE 10 MG: 10 TABLET, FILM COATED ORAL at 01:02

## 2025-02-10 RX ADMIN — ATORVASTATIN CALCIUM 10 MG: 10 TABLET, FILM COATED ORAL at 09:02

## 2025-02-10 RX ADMIN — DICYCLOMINE HYDROCHLORIDE 10 MG: 10 CAPSULE ORAL at 05:02

## 2025-02-10 RX ADMIN — LIDOCAINE 5% 1 PATCH: 700 PATCH TOPICAL at 09:02

## 2025-02-10 RX ADMIN — GABAPENTIN 200 MG: 100 CAPSULE ORAL at 09:02

## 2025-02-10 RX ADMIN — HEPARIN SODIUM 5000 UNITS: 5000 INJECTION INTRAVENOUS; SUBCUTANEOUS at 05:02

## 2025-02-10 RX ADMIN — ASPIRIN 81 MG: 81 TABLET, COATED ORAL at 09:02

## 2025-02-10 RX ADMIN — PANTOPRAZOLE SODIUM 40 MG: 40 INJECTION, POWDER, LYOPHILIZED, FOR SOLUTION INTRAVENOUS at 09:02

## 2025-02-10 RX ADMIN — LISINOPRIL 40 MG: 20 TABLET ORAL at 09:02

## 2025-02-10 RX ADMIN — INSULIN ASPART 4 UNITS: 100 INJECTION, SOLUTION INTRAVENOUS; SUBCUTANEOUS at 12:02

## 2025-02-10 NOTE — PLAN OF CARE
Medicare Message     Important Message from Medicare regarding Discharge Appeal Rights -- Explained to patient/caregiver; Signed/date by patient/caregiver   Date IMM was signed -- 2/10/2025   Time IMM was signed -- 0940

## 2025-02-10 NOTE — DISCHARGE SUMMARY
[facility] Surg [facility] Medicine  Discharge Summary      Patient Name: Annie Matthews  MRN: [id number]  GIANFRANCO: [id number]  Patient Class: IP- Inpatient  Admission Date: 2/7/2025  Hospital Length of Stay: 1 days  Discharge Date and Time:  02/10/2025 2:19 PM  Attending Physician: Noelle Yap MD   Discharging Provider: Torie Brunner DNP  Primary Care Provider: Virgen Monique NP-C    Primary Care Team: Networked reference to record PCT     HPI:   Annie Matthews is a 69 year old female with a past medical history of HTN, diabetes and hyperlipidemia who presents with worsening abdominal pain that radiates to her left flank with associated nausea and vomiting.  She states she is unable to tolerate anything p.o..  Patient was seen in the ED on 02/01/2025 she underwent CT abdomen at that time that was negative for any acute findings and was discharged with IV antiemetics.  She states her symptoms have persisted and she is feeling worse prompting her to returned to the ED. patient reports fevers, chills and denies chest pain or shortness of breath.    ED workup significant for hyperglycemia, elevated WBC 14 and UA with hematuria, 1+ protein and many bacteria.  Creatinine 1.5 elevated from baseline 0.9.  CT abdomen performed today which showed pyelonephritis.  Patient received IV fluids, antiemetics and was started on IV ceftriaxone.  She was referred to Hospital Medicine and will be admitted for further evaluation and management.    * No surgery found *      Hospital Course:   Ms. Matthews was admitted with abdominal pain.  Pain has been present for over 2 weeks and this is her 2nd hospital visit.  On this repeat CT abdomen pelvis it was noted that she had pyelonephritis.  She was admitted and started on IV antibiotics.  Her flank pain improved but her abdominal pain continues to affect her.  Bentyl has been started along with gabapentin.  She notes that Toradol helps the abdominal pain but  ibuprofen does not.  She has tolerated a regular diet.  Her glucose is improving.  On discharge day she was seen and examined.  She is tolerating a regular diet.  She does not have any aversion to taking the Lantus or aspart insulin at home.  She would like to continue her inpatient diabetes and abdominal pain regimen at home as she feels that her body is tolerating it well.  Discussed importance of not taking Toradol multiple days in a row as it can cause a GI bleed or kidney issues.  Discussed importance of monitoring her blood glucose.  Patient said she would use her dex com, and her glucometer also.  Referrals placed for endocrine, GI, and PCP providers.  Ms. Matthews will be discharged home.     Goals of Care Treatment Preferences:  Code Status: Full Code      SDOH Screening:  The patient was screened for utility difficulties, food insecurity, transport difficulties, housing insecurity, and interpersonal safety and there were no concerns identified this admission.     Consults:   Consults (From admission, onward)          Status Ordering Provider     Inpatient consult to Diabetes educator  Once        Provider:  (Not yet assigned)    Acknowledged MAYNOR MCKEON              Final Active Diagnoses:    Diagnosis Date Noted POA    PRINCIPAL PROBLEM:  Pyelonephritis [N12] 02/07/2025 Yes    Abdominal pain [R10.9] 02/09/2025 Yes    Poorly controlled diabetes mellitus [E11.65] 08/22/2018 Yes    Hypertension [I10] 07/22/2015 Yes      Problems Resolved During this Admission:       Discharged Condition: good    Disposition: Home or Self Care    Follow Up:   Follow-up Information       Kenya Monique NP. Call.    Specialty: Family Medicine  Why: Call for a hopsital follow up appointment.  Contact information:  [address]  [place] [address]  [phone]                           Patient Instructions:      Ambulatory referral/consult to Diabetes Education   Standing Status: Future   Referral Priority:  Routine Referral Type: Consultation   Referral Reason: Specialty Services Required   Requested Specialty: Diabetes   Number of Visits Requested: 1 Expiration Date: 02/08/26     Ambulatory referral/consult to Endocrinology   Standing Status: Future   Referral Priority: Routine Referral Type: Consultation   Requested Specialty: Endocrinology   Number of Visits Requested: 1     Ambulatory referral/consult to Gastroenterology   Standing Status: Future   Referral Priority: Routine Referral Type: Consultation   Referral Reason: Specialty Services Required   Requested Specialty: Gastroenterology   Number of Visits Requested: 1     Ambulatory referral/consult to Internal Medicine   Standing Status: Future   Referral Priority: Routine Referral Type: Consultation   Referral Reason: Specialty Services Required   Requested Specialty: Internal Medicine   Number of Visits Requested: 1     Diet diabetic     Notify your health care provider if you experience any of the following:  temperature >100.4     Notify your health care provider if you experience any of the following:  persistent nausea and vomiting or diarrhea     Notify your health care provider if you experience any of the following:  redness, tenderness, or signs of infection (pain, swelling, redness, odor or green/yellow discharge around incision site)     Activity as tolerated       Significant Diagnostic Studies: N/A    Pending Diagnostic Studies:       None           Medications:  Reconciled Home Medications:      Medication List        START taking these medications      amLODIPine 5 MG tablet  Commonly known as: NORVASC  Take 1 tablet (5 mg total) by mouth once daily.  Start taking on: February 11, 2025     ciprofloxacin HCl 500 MG tablet  Commonly known as: CIPRO  Take 1 tablet (500 mg total) by mouth every 12 (twelve) hours. for 5 days  Start taking on: February 11, 2025     dicyclomine 10 MG capsule  Commonly known as: BENTYL  Take 1 capsule (10 mg total) by  "mouth 4 (four) times daily before meals and nightly. for 10 days  Replaces: dicyclomine 20 mg tablet     gabapentin 100 MG capsule  Commonly known as: NEURONTIN  Take 2 capsules (200 mg total) by mouth 3 (three) times daily.     insulin aspart U-100 100 unit/mL (3 mL) Inpn pen  Commonly known as: NovoLOG  Inject 3 Units into the skin 3 (three) times daily with meals. Check glucose before administering. Hold if blood sugar is less than 150.     insulin glargine U-100 (Lantus) 100 unit/mL (3 mL) Inpn pen  Inject 20 Units into the skin every evening.  Replaces: TRESIBA FLEXTOUCH U-100 100 unit/mL (3 mL) insulin pen     ketorolac 10 mg tablet  Commonly known as: TORADOL  Take 1 tablet (10 mg total) by mouth daily as needed for Pain (severe pain 7-10/10. Do not take more than 3 days in a row.).     pen needle, diabetic 31 gauge x 5/16" Ndle  Commonly known as: INSUPEN PEN NEEDLE  1 each by Misc.(Non-Drug; Combo Route) route 4 (four) times daily before meals and nightly.            CHANGE how you take these medications      glipiZIDE 10 MG tablet  Commonly known as: GLUCOTROL  Take 1 tablet (10 mg total) by mouth daily with breakfast.  Start taking on: February 11, 2025  What changed: when to take this            CONTINUE taking these medications      albuterol 90 mcg/actuation inhaler  Commonly known as: PROVENTIL/VENTOLIN HFA  Inhale 1-2 puffs into the lungs every 6 (six) hours as needed for Wheezing or Shortness of Breath (Please dispense with spacer).     aspirin 81 MG EC tablet  Commonly known as: ECOTRIN  Take 81 mg by mouth once daily.     atorvastatin 10 MG tablet  Commonly known as: LIPITOR  Take 10 mg by mouth once daily.     diclofenac sodium 1 % Gel  Commonly known as: VOLTAREN  Apply 2 g topically 3 (three) times daily as needed (muscle spasm pain). Apply 2 grams to affected area 3 times daily as needed     LIDOcaine 5 %  Commonly known as: LIDODERM  Apply to affected area. Use as directed. May use 4% " lidocaine patch as alternative.     lisinopriL 40 MG tablet  Commonly known as: PRINIVIL,ZESTRIL  Take 40 mg by mouth once daily.     ondansetron 4 MG Tbdl  Commonly known as: ZOFRAN-ODT  Take 1 tablet (4 mg total) by mouth every 8 (eight) hours as needed.     pantoprazole 20 MG tablet  Commonly known as: PROTONIX  Take 2 tablets (40 mg total) by mouth once daily.            STOP taking these medications      dicyclomine 20 mg tablet  Commonly known as: BENTYL  Replaced by: dicyclomine 10 MG capsule     ibuprofen 800 MG tablet  Commonly known as: ADVIL,MOTRIN     TRESIBA FLEXTOUCH U-100 100 unit/mL (3 mL) insulin pen  Generic drug: insulin degludec  Replaced by: insulin glargine U-100 (Lantus) 100 unit/mL (3 mL) Inpn pen              Indwelling Lines/Drains at time of discharge:   Lines/Drains/Airways       None                   Time spent on the discharge of patient: 58 minutes         Torie Brunner DNP  Department of Hospital Medicine  [facility])

## 2025-02-10 NOTE — PLAN OF CARE
Problem: Adult Inpatient Plan of Care  Goal: Plan of Care Review  Outcome: Adequate for Care Transition  Goal: Patient-Specific Goal (Individualized)  Outcome: Adequate for Care Transition  Goal: Absence of Hospital-Acquired Illness or Injury  Outcome: Adequate for Care Transition  Goal: Optimal Comfort and Wellbeing  Outcome: Adequate for Care Transition  Goal: Readiness for Transition of Care  Outcome: Adequate for Care Transition     Problem: Infection  Goal: Absence of Infection Signs and Symptoms  Outcome: Adequate for Care Transition     Problem: Diabetes Comorbidity  Goal: Blood Glucose Level Within Targeted Range  Outcome: Adequate for Care Transition     Problem: Pain Acute  Goal: Optimal Pain Control and Function  Outcome: Adequate for Care Transition

## 2025-02-10 NOTE — NURSING
IV removed with catheter intact, patient did DC teaching with the virtual nurses and insisted on walking downstairs to meet her sister.

## 2025-02-10 NOTE — PLAN OF CARE
Problem: Adult Inpatient Plan of Care  Goal: Plan of Care Review  Outcome: Progressing     Problem: Adult Inpatient Plan of Care  Goal: Optimal Comfort and Wellbeing  Outcome: Progressing     Problem: Infection  Goal: Absence of Infection Signs and Symptoms  Outcome: Progressing     Problem: Diabetes Comorbidity  Goal: Blood Glucose Level Within Targeted Range  Outcome: Progressing     Problem: Pain Acute  Goal: Optimal Pain Control and Function  Outcome: Progressing

## 2025-02-10 NOTE — PLAN OF CARE
Case Management Final Discharge Note      Discharge Disposition: Home     New DME ordered / company name: NA    Relevant SDOH / Transition of Care Barriers:  None    Person available to provide assistance at home when needed and their contact information: Self    Scheduled followup appointment: Patient will be contacted for GI     Referrals placed: GI    Transportation: Family after 4 PM.         Patient and family educated on discharge services and updated on DC plan. Bedside RN notified, patient clear to discharge from Case Management Perspective.   [facility] - Med Surg ([place])  Discharge Final Note    Primary Care Provider: Virgen Monique NP-C    Expected Discharge Date: 2/10/2025    Final Discharge Note (most recent)       Final Note - 02/10/25 1427          Final Note    Assessment Type Final Discharge Note (P)      Anticipated Discharge Disposition Home or Self Care (P)      Hospital Resources/Appts/Education Provided Provided patient/caregiver with written discharge plan information;Appointments scheduled and added to AVS (P)         Post-Acute Status    Post-Acute Authorization Other (P)      Other Status No Post-Acute Service Needs (P)      Discharge Delays None known at this time (P)                      Important Message from Medicare  Important Message from Medicare regarding Discharge Appeal Rights: Explained to patient/caregiver, Signed/date by patient/caregiver     Date IMM was signed: 02/10/25  Time IMM was signed: 0940    Contact Info       Kenya Monique NP   Specialty: Family Medicine    [address]  [place] [address]   Phone: [phone]       Next Steps: Call    Instructions: Call for a hopsital follow up appointment.

## 2025-02-10 NOTE — PROGRESS NOTES
"                   Medical Nutrition Therapy        Reason for Assessment: identified at risk by screening criteria (MST >3)   Dx: pyelonephritis  Medical Hx: HTN, diabetes and hyperlipidemia   Nutrition Handout: Planning Healthy Meals    Social Drivers of Health     Food Insecurity: No Food Insecurity (2/8/2025)    Hunger Vital Sign     Worried About Running Out of Food in the Last Year: Never true     Ran Out of Food in the Last Year: Never true   Recent Concern: Food Insecurity - Food Insecurity Present (2/7/2025)    Hunger Vital Sign     Worried About Running Out of Food in the Last Year: Never true     Ran Out of Food in the Last Year: Sometimes true     General Info:   Pt admitted for pyelonephritis, uncontrolled DM with A1c >14, abd pain N/V. Reported feeling better this am, and able to tolerate breakfast. Intermittent nausea but none since yesterday. Pt reports eating 1 meal/day. Not taking meds d/t being in too much pain and feeling week. Provided brief diabetes diet education prior to pt's discharge today. Discussed what carbohydrates are and which foods affect blood glucose. Encouraged pt to try to eat more consistently throughout the day, not snacking late at night and to have more complete meals. Discussed consuming carbohydrates with other non carb foods. Encouraged pt to learn portion sizes of carbs and to consume in moderation. Discussed the importance of avoiding sugar sweetened beverages. Pt reports drinking ginger ale d/t not feeling well. Discussed switching to sugar free/diet beverages but emphasized water consumption. Provided handout and pt voiced understanding. RD will monitor if patient is to remain admitted. Pt appeared will nourished.         Current Diet: Diabetic Diet 2000 kcal  75% intake of meals: grits, eggs, lane      Ht: 5'2"  Wt: 96.8 kg  BMI: 39     Labs: Reviewed   A1C >14; glucose 335(H); eGFR 45!  Meds: Reviewed   Scheduled Meds:   amLODIPine  5 mg Oral Daily    aspirin  81 mg " Oral Daily    atorvastatin  10 mg Oral Daily    cefTRIAXone (Rocephin) IV (PEDS and ADULTS)  2 g Intravenous Q24H    dicyclomine  10 mg Oral QID (AC & HS)    gabapentin  200 mg Oral TID    glipiZIDE  10 mg Oral Daily with breakfast    heparin (porcine)  5,000 Units Subcutaneous Q8H    insulin aspart U-100  4 Units Subcutaneous TIDWM    insulin glargine U-100  20 Units Subcutaneous QHS    LIDOcaine  1 patch Transdermal Daily    lisinopriL  40 mg Oral Daily    pantoprazole  40 mg Intravenous Daily    polyethylene glycol  17 g Oral Daily     Continuous Infusions:  PRN Meds:.  Current Facility-Administered Medications:     acetaminophen, 650 mg, Oral, Q8H PRN    albuterol-ipratropium, 3 mL, Nebulization, Q6H PRN    dextrose 50%, 12.5 g, Intravenous, PRN    glucagon (human recombinant), 1 mg, Intramuscular, PRN    glucose, 16 g, Oral, PRN    glucose, 24 g, Oral, PRN    insulin aspart U-100, 0-10 Units, Subcutaneous, Q6H PRN    ketorolac, 10 mg, Oral, Q8H PRN    melatonin, 6 mg, Oral, Nightly PRN    naloxone, 0.02 mg, Intravenous, PRN    ondansetron, 4 mg, Intravenous, Q6H PRN    prochlorperazine, 5 mg, Intravenous, Q6H PRN     Overall Physical Appearance: normal appearance. Well nourished.      Level of Risk: low     Nutrition Dx: Excessive carbohydrate intake r/t lack of knowledge regarding DM aeb inability to name carbohydrates, which foods affect BG, A1C > 14, and postprandial glucose levels above desired targets.        RD follow-up: N/A pending discharge later today     Thanks,  Jennifer More, RDN, LDN

## 2025-02-12 ENCOUNTER — TELEPHONE (OUTPATIENT)
Dept: ORTHOPEDICS | Facility: CLINIC | Age: 70
End: 2025-02-12
Payer: MEDICARE

## 2025-02-13 LAB
BACTERIA BLD CULT: NORMAL
BACTERIA BLD CULT: NORMAL

## 2025-03-18 ENCOUNTER — OFFICE VISIT (OUTPATIENT)
Dept: INTERNAL MEDICINE | Facility: CLINIC | Age: 70
End: 2025-03-18
Payer: MEDICARE

## 2025-03-18 ENCOUNTER — LAB VISIT (OUTPATIENT)
Dept: LAB | Facility: OTHER | Age: 70
End: 2025-03-18
Attending: INTERNAL MEDICINE
Payer: MEDICARE

## 2025-03-18 VITALS
BODY MASS INDEX: 37.73 KG/M2 | OXYGEN SATURATION: 97 % | HEIGHT: 62 IN | SYSTOLIC BLOOD PRESSURE: 138 MMHG | WEIGHT: 205 LBS | DIASTOLIC BLOOD PRESSURE: 83 MMHG | HEART RATE: 76 BPM

## 2025-03-18 DIAGNOSIS — Z79.4 UNCONTROLLED TYPE 2 DIABETES MELLITUS WITH HYPERGLYCEMIA, WITH LONG-TERM CURRENT USE OF INSULIN: Primary | ICD-10-CM

## 2025-03-18 DIAGNOSIS — E08.22 DIABETES MELLITUS DUE TO UNDERLYING CONDITION WITH STAGE 3B CHRONIC KIDNEY DISEASE, UNSPECIFIED WHETHER LONG TERM INSULIN USE: ICD-10-CM

## 2025-03-18 DIAGNOSIS — R35.0 URINARY FREQUENCY: ICD-10-CM

## 2025-03-18 DIAGNOSIS — E11.65 UNCONTROLLED TYPE 2 DIABETES MELLITUS WITH HYPERGLYCEMIA, WITH LONG-TERM CURRENT USE OF INSULIN: Primary | ICD-10-CM

## 2025-03-18 DIAGNOSIS — D64.9 NORMOCYTIC ANEMIA: ICD-10-CM

## 2025-03-18 DIAGNOSIS — N18.32 STAGE 3B CHRONIC KIDNEY DISEASE: ICD-10-CM

## 2025-03-18 DIAGNOSIS — J45.909 ASTHMA, UNSPECIFIED ASTHMA SEVERITY, UNSPECIFIED WHETHER COMPLICATED, UNSPECIFIED WHETHER PERSISTENT: ICD-10-CM

## 2025-03-18 DIAGNOSIS — E11.65 UNCONTROLLED TYPE 2 DIABETES MELLITUS WITH HYPERGLYCEMIA, WITH LONG-TERM CURRENT USE OF INSULIN: ICD-10-CM

## 2025-03-18 DIAGNOSIS — N18.32 DIABETES MELLITUS DUE TO UNDERLYING CONDITION WITH STAGE 3B CHRONIC KIDNEY DISEASE, UNSPECIFIED WHETHER LONG TERM INSULIN USE: ICD-10-CM

## 2025-03-18 DIAGNOSIS — E78.5 HYPERLIPIDEMIA LDL GOAL <130: ICD-10-CM

## 2025-03-18 DIAGNOSIS — E88.810 METABOLIC SYNDROME: ICD-10-CM

## 2025-03-18 DIAGNOSIS — R10.9 ABDOMINAL PAIN, UNSPECIFIED ABDOMINAL LOCATION: ICD-10-CM

## 2025-03-18 DIAGNOSIS — Z01.89 EARLY HOSPITAL DISCHARGE ASSESSMENT FOLLOWING DELIVERY: ICD-10-CM

## 2025-03-18 DIAGNOSIS — R10.30 LOWER ABDOMINAL PAIN: ICD-10-CM

## 2025-03-18 DIAGNOSIS — Z79.4 UNCONTROLLED TYPE 2 DIABETES MELLITUS WITH HYPERGLYCEMIA, WITH LONG-TERM CURRENT USE OF INSULIN: ICD-10-CM

## 2025-03-18 DIAGNOSIS — E66.9 OBESITY (BMI 35.0-39.9 WITHOUT COMORBIDITY): ICD-10-CM

## 2025-03-18 DIAGNOSIS — I10 HYPERTENSION, UNSPECIFIED TYPE: ICD-10-CM

## 2025-03-18 DIAGNOSIS — E11.65 POORLY CONTROLLED DIABETES MELLITUS: ICD-10-CM

## 2025-03-18 DIAGNOSIS — E11.42 DIABETIC POLYNEUROPATHY ASSOCIATED WITH TYPE 2 DIABETES MELLITUS: ICD-10-CM

## 2025-03-18 LAB
ALBUMIN SERPL BCP-MCNC: 3.5 G/DL (ref 3.5–5.2)
ALP SERPL-CCNC: 78 U/L (ref 40–150)
ALT SERPL W/O P-5'-P-CCNC: 27 U/L (ref 10–44)
ANION GAP SERPL CALC-SCNC: 5 MMOL/L (ref 8–16)
AST SERPL-CCNC: 17 U/L (ref 10–40)
BASOPHILS # BLD AUTO: 0.03 K/UL (ref 0–0.2)
BASOPHILS NFR BLD: 0.3 % (ref 0–1.9)
BILIRUB SERPL-MCNC: 0.4 MG/DL (ref 0.1–1)
BUN SERPL-MCNC: 33 MG/DL (ref 8–23)
CALCIUM SERPL-MCNC: 9.2 MG/DL (ref 8.7–10.5)
CHLORIDE SERPL-SCNC: 109 MMOL/L (ref 95–110)
CO2 SERPL-SCNC: 27 MMOL/L (ref 23–29)
CREAT SERPL-MCNC: 1.3 MG/DL (ref 0.5–1.4)
DIFFERENTIAL METHOD BLD: ABNORMAL
EOSINOPHIL # BLD AUTO: 0.1 K/UL (ref 0–0.5)
EOSINOPHIL NFR BLD: 1 % (ref 0–8)
ERYTHROCYTE [DISTWIDTH] IN BLOOD BY AUTOMATED COUNT: 14.6 % (ref 11.5–14.5)
EST. GFR  (NO RACE VARIABLE): 45 ML/MIN/1.73 M^2
ESTIMATED AVG GLUCOSE: 295 MG/DL (ref 68–131)
GLUCOSE SERPL-MCNC: 100 MG/DL (ref 70–110)
HBA1C MFR BLD: 11.9 % (ref 4–5.6)
HCT VFR BLD AUTO: 40.8 % (ref 37–48.5)
HGB BLD-MCNC: 12.4 G/DL (ref 12–16)
IMM GRANULOCYTES # BLD AUTO: 0.1 K/UL (ref 0–0.04)
IMM GRANULOCYTES NFR BLD AUTO: 1 % (ref 0–0.5)
LYMPHOCYTES # BLD AUTO: 3.9 K/UL (ref 1–4.8)
LYMPHOCYTES NFR BLD: 39.9 % (ref 18–48)
MCH RBC QN AUTO: 27.3 PG (ref 27–31)
MCHC RBC AUTO-ENTMCNC: 30.4 G/DL (ref 32–36)
MCV RBC AUTO: 90 FL (ref 82–98)
MONOCYTES # BLD AUTO: 0.8 K/UL (ref 0.3–1)
MONOCYTES NFR BLD: 8.6 % (ref 4–15)
NEUTROPHILS # BLD AUTO: 4.7 K/UL (ref 1.8–7.7)
NEUTROPHILS NFR BLD: 49.2 % (ref 38–73)
NRBC BLD-RTO: 0 /100 WBC
PLATELET # BLD AUTO: 298 K/UL (ref 150–450)
PMV BLD AUTO: 10 FL (ref 9.2–12.9)
POTASSIUM SERPL-SCNC: 5.2 MMOL/L (ref 3.5–5.1)
PROT SERPL-MCNC: 7.5 G/DL (ref 6–8.4)
RBC # BLD AUTO: 4.55 M/UL (ref 4–5.4)
RETICS/RBC NFR AUTO: 1.7 % (ref 0.5–2.5)
SODIUM SERPL-SCNC: 141 MMOL/L (ref 136–145)
WBC # BLD AUTO: 9.65 K/UL (ref 3.9–12.7)

## 2025-03-18 PROCEDURE — 1160F RVW MEDS BY RX/DR IN RCRD: CPT | Mod: CPTII,S$GLB,, | Performed by: INTERNAL MEDICINE

## 2025-03-18 PROCEDURE — 3008F BODY MASS INDEX DOCD: CPT | Mod: CPTII,S$GLB,, | Performed by: INTERNAL MEDICINE

## 2025-03-18 PROCEDURE — 83540 ASSAY OF IRON: CPT | Performed by: INTERNAL MEDICINE

## 2025-03-18 PROCEDURE — 85045 AUTOMATED RETICULOCYTE COUNT: CPT | Performed by: INTERNAL MEDICINE

## 2025-03-18 PROCEDURE — 3079F DIAST BP 80-89 MM HG: CPT | Mod: CPTII,S$GLB,, | Performed by: INTERNAL MEDICINE

## 2025-03-18 PROCEDURE — G2211 COMPLEX E/M VISIT ADD ON: HCPCS | Mod: S$GLB,,, | Performed by: INTERNAL MEDICINE

## 2025-03-18 PROCEDURE — 36415 COLL VENOUS BLD VENIPUNCTURE: CPT | Performed by: INTERNAL MEDICINE

## 2025-03-18 PROCEDURE — 1125F AMNT PAIN NOTED PAIN PRSNT: CPT | Mod: CPTII,S$GLB,, | Performed by: INTERNAL MEDICINE

## 2025-03-18 PROCEDURE — 1159F MED LIST DOCD IN RCRD: CPT | Mod: CPTII,S$GLB,, | Performed by: INTERNAL MEDICINE

## 2025-03-18 PROCEDURE — 3046F HEMOGLOBIN A1C LEVEL >9.0%: CPT | Mod: CPTII,S$GLB,, | Performed by: INTERNAL MEDICINE

## 2025-03-18 PROCEDURE — 82728 ASSAY OF FERRITIN: CPT | Performed by: INTERNAL MEDICINE

## 2025-03-18 PROCEDURE — 80061 LIPID PANEL: CPT | Performed by: INTERNAL MEDICINE

## 2025-03-18 PROCEDURE — 3075F SYST BP GE 130 - 139MM HG: CPT | Mod: CPTII,S$GLB,, | Performed by: INTERNAL MEDICINE

## 2025-03-18 PROCEDURE — 85025 COMPLETE CBC W/AUTO DIFF WBC: CPT | Performed by: INTERNAL MEDICINE

## 2025-03-18 PROCEDURE — 99999 PR PBB SHADOW E&M-EST. PATIENT-LVL V: CPT | Mod: PBBFAC,,, | Performed by: INTERNAL MEDICINE

## 2025-03-18 PROCEDURE — 4010F ACE/ARB THERAPY RXD/TAKEN: CPT | Mod: CPTII,S$GLB,, | Performed by: INTERNAL MEDICINE

## 2025-03-18 PROCEDURE — 1101F PT FALLS ASSESS-DOCD LE1/YR: CPT | Mod: CPTII,S$GLB,, | Performed by: INTERNAL MEDICINE

## 2025-03-18 PROCEDURE — 99205 OFFICE O/P NEW HI 60 MIN: CPT | Mod: S$GLB,,, | Performed by: INTERNAL MEDICINE

## 2025-03-18 PROCEDURE — 80053 COMPREHEN METABOLIC PANEL: CPT | Performed by: INTERNAL MEDICINE

## 2025-03-18 PROCEDURE — 3288F FALL RISK ASSESSMENT DOCD: CPT | Mod: CPTII,S$GLB,, | Performed by: INTERNAL MEDICINE

## 2025-03-18 PROCEDURE — 83036 HEMOGLOBIN GLYCOSYLATED A1C: CPT | Performed by: INTERNAL MEDICINE

## 2025-03-18 RX ORDER — DICYCLOMINE HYDROCHLORIDE 10 MG/1
10 CAPSULE ORAL DAILY
COMMUNITY
Start: 2025-02-10

## 2025-03-18 RX ORDER — BLOOD-GLUCOSE SENSOR
EACH MISCELLANEOUS
Qty: 10 EACH | Refills: 3 | Status: SHIPPED | OUTPATIENT
Start: 2025-03-18

## 2025-03-18 NOTE — PATIENT INSTRUCTIONS
Follow-up in her PCP clinic in 4 weeks by primary care physician and follow-up in diabetic clinic by endocrinologist in 4 weeks

## 2025-03-18 NOTE — PROGRESS NOTES
Subjective:      Patient ID: Annie Matthews is a 69 y.o. female.    Chief Complaint: Hospital Follow Up      History of Present Illness    CHIEF COMPLAINT:  Patient presents today for follow up after recent hospitalization for abdominal pain and urinary tract infection    RECENT HOSPITALIZATION AND CURRENT SYMPTOMS:  The patient is a 69-year-old woman presenting today for an internal medicine consultation and assessment following her discharge from the emergency room on February 7, 2025, due to uncontrolled diabetes with diabetic ketoacidosis (DKA). She was found to have an A1c greater than 14, blood glucose levels exceeding 315, and positive ketones in both urine and blood. Additionally, she developed abdominal pain secondary to the ketoacidosis and hyperglycemia caused by her uncontrolled diabetes.  The patient initially visited the ER on February 7th with complaints of stomach, side, and back pain. Initial ER scans and urine tests were normal, and she was prescribed pain medication. However, her pain persisted and worsened despite regular Tylenol use, prompting a second ER visit. During this visit, she was diagnosed with a urinary tract infection (UTI) and a potential kidney infection, leading to her hospitalization. She was discharged the day after the [place].  At present, the patient reports that while the pain has significantly improved, it is still present at a mild level, rated 2/10. She also continues to experience chronic nausea. Recently, she had a first-time episode of constipation, which resolved with magnesium supplementation. Although she was previously followed regularly in a diabetes clinic every month, she has not seen her endocrinologist in the past three months due to the absence of a local endocrinologist.  Today, the patient is alert and oriented, with no complaints of mental confusion. She denies nausea or vomiting. She experiences urinary frequency but denies dysuria, urgency, or  flank pain. Her blood pressure is well-controlled, with a reading of 138/83, and her pulse rate is 76 bpm. She checked her blood sugar twice this morning, with readings of 114 and 140.  She denies any chest pain, shortness of breath, lightheadedness, or ankle swelling. However, over the past three days, her blood glucose levels have typically exceeded 300 at bedtime. We discussed strategies for better managing her hyperglycemia at bedtime.  The patient was educated about her uncontrolled diabetes and hyperglycemia. I advised her to continue Lantus 20 units daily, NovoLog 3 units three times daily, plus additional NovoLog every 6 hours as needed if blood glucose exceeds 300. She should also continue taking Glipizide 10 mg daily.  We will check her urinalysis today to evaluate for a possible urinary tract infection and order an A1c and CMP to assess her diabetic treatment progress. I strongly encouraged her to follow up with the diabetes clinic at [facility] for ongoing care. Additionally, I recommended that she continue seeing her primary care provider for chronic disease management. Given her recent history of uncontrolled diabetes, DKA, and abdominal pain, I suggest she visit her PCP clinic regularly every month for the next three months.  This is the patients first visit with me. I reviewed her previous medical records, ER visit notes, laboratory results, and current medications, which took approximately 15 minutes.      MEDICAL HISTORY:  She has occasional asthma and a history of foot fracture in three places with residual tingling. She developed anemia approximately one month ago with no changes in stool color.    MEDICATIONS:  She recently changed from Tresiba to Novolog and Lantus, currently taking 20 units of Lantus at night. Previous GI symptoms including nausea and diarrhea were attributed to Truvada, which have resolved since medication change.    ALLERGIES:  She has allergies to sulfur and  codeine with associated rash and itching, and food allergies to strawberries and tomatoes.      ROS:  General: -fever, -chills, +fatigue, -weight gain, -weight loss   Eyes: -vision changes, -redness, -discharge  ENT: -ear pain, -nasal congestion, -sore throat  Cardiovascular: -chest pain, -palpitations, -lower extremity edema  Respiratory: -cough, -shortness of breath  Gastrointestinal: -abdominal pain, +nausea, +vomiting, -diarrhea, +constipation, -blood in stool  Genitourinary: -dysuria, -hematuria, -frequency  Musculoskeletal: -joint pain, -muscle pain  Skin: -rash, -lesion  Neurological: -headache, -dizziness, -numbness, +tingling  Psychiatric: -anxiety, -depression, -sleep difficulty  Allergic: +allergic reactions, +food allergies         Active Problem List with Overview Notes    Diagnosis Date Noted    Abdominal pain 02/09/2025    Pyelonephritis 02/07/2025    Lumbar pain 12/14/2023    Gastroesophageal reflux disease without esophagitis 06/02/2020    Poorly controlled diabetes mellitus 08/22/2018    Bronchitis 05/05/2018    Wheezing 05/05/2018    Sinusitis 05/05/2018    Bilateral non-suppurative otitis media 05/05/2018    Hypertension 07/22/2015    Valvular heart disease 05/21/2010        MEDICATIONS:  Current Medications[1]    Current Outpatient Medications:     albuterol (PROVENTIL/VENTOLIN HFA) 90 mcg/actuation inhaler, Inhale 1-2 puffs into the lungs every 6 (six) hours as needed for Wheezing or Shortness of Breath (Please dispense with spacer)., Disp: 6.7 g, Rfl: 0    aspirin (ECOTRIN) 81 MG EC tablet, Take 81 mg by mouth once daily., Disp: , Rfl:     dicyclomine (BENTYL) 10 MG capsule, Take 10 mg by mouth once daily., Disp: , Rfl:     gabapentin (NEURONTIN) 100 MG capsule, Take 2 capsules (200 mg total) by mouth 3 (three) times daily., Disp: 90 capsule, Rfl: 0    glipiZIDE (GLUCOTROL) 10 MG tablet, Take 1 tablet (10 mg total) by mouth daily with breakfast., Disp: 30 tablet, Rfl: 1     "insulin aspart U-100 (NOVOLOG) 100 unit/mL (3 mL) InPn pen, Inject 3 Units into the skin 3 (three) times daily with meals. Check glucose before administering. Hold if blood sugar is less than 150., Disp: 15 mL, Rfl: 0    insulin glargine U-100, Lantus, 100 unit/mL (3 mL) SubQ InPn pen, Inject 20 Units into the skin every evening., Disp: 15 mL, Rfl: 0    ondansetron (ZOFRAN-ODT) 4 MG TbDL, Take 1 tablet (4 mg total) by mouth every 8 (eight) hours as needed., Disp: 12 tablet, Rfl: 0    amLODIPine (NORVASC) 5 MG tablet, Take 1 tablet (5 mg total) by mouth once daily. (Patient not taking: Reported on 3/18/2025), Disp: 90 tablet, Rfl: 0    atorvastatin (LIPITOR) 10 MG tablet, Take 10 mg by mouth once daily. (Patient not taking: Reported on 3/18/2025), Disp: , Rfl:     diclofenac sodium (VOLTAREN) 1 % Gel, Apply 2 g topically 3 (three) times daily as needed (muscle spasm pain). Apply 2 grams to affected area 3 times daily as needed, Disp: 100 g, Rfl: 0    LIDOcaine (LIDODERM) 5 %, Apply to affected area. Use as directed. May use 4% lidocaine patch as alternative. (Patient not taking: Reported on 3/18/2025), Disp: 15 patch, Rfl: 1    lisinopril (PRINIVIL,ZESTRIL) 40 MG tablet, Take 40 mg by mouth once daily. (Patient not taking: Reported on 3/18/2025), Disp: , Rfl:     pen needle, diabetic (INSUPEN PEN NEEDLE) 31 gauge x 5/16" Ndle, 1 each by Misc.(Non-Drug; Combo Route) route 4 (four) times daily before meals and nightly., Disp: 100 each, Rfl: 1  ALLERGIES:  Review of patient's allergies indicates:   Allergen Reactions    Strawberry Hives and Itching    Sulfa (sulfonamide antibiotics)     Tomato Hives    Codeine Nausea And Vomiting        Past Medical History:   Diagnosis Date    Atrial fibrillation     Diabetes mellitus     Diabetes mellitus, type 2     Hypertension      Past Surgical History:   Procedure Laterality Date    HYSTERECTOMY       Social History     Social History Narrative    Not on file " "    Family History   Problem Relation Name Age of Onset    Heart failure Mother      Heart attack Father      Cancer Sister         Vitals:    03/18/25 1418   BP: 138/83   Pulse: 76   SpO2: 97%   Weight: 93 kg (205 lb 0.4 oz)   Height: 5' 2" (1.575 m)   PainSc:   2   PainLoc: Nose       Review of Systems     Lab Results   Component Value Date    HGBA1C 14.0 (H) 02/09/2025    HGBA1C 14.0 (H) 02/08/2025    HGBA1C 13.6 (H) 11/06/2024     No results found for: "MICALBCREAT"  Lab Results   Component Value Date    LDLCALC 158 (H) 04/02/2024    LDLCALC 67 10/06/2022    CHOL 236 (H) 04/02/2024    HDL 41 04/02/2024    TRIG 187 (H) 04/02/2024       Lab Results   Component Value Date     02/10/2025    K 4.5 02/10/2025     02/10/2025    CO2 24 02/10/2025     (H) 02/10/2025    BUN 20 02/10/2025    CREATININE 1.3 02/10/2025    CALCIUM 8.8 02/10/2025    PROT 7.0 02/08/2025    ALBUMIN 2.3 (L) 02/08/2025    BILITOT 0.3 02/08/2025    ALKPHOS 146 02/08/2025    AST 16 02/08/2025    ALT 50 (H) 02/08/2025    ANIONGAP 9 02/10/2025    ESTGFRAFRICA >60 12/21/2021    EGFRNONAA 53 (A) 12/21/2021    WBC 11.01 02/09/2025    HGB 10.9 (L) 02/09/2025    HGB 10.8 (L) 02/08/2025    HCT 34.4 (L) 02/09/2025    MCV 87 02/09/2025     02/09/2025    TSH 4.88 04/09/2021    HEPCAB Negative 02/01/2025       No results found for: "LH", "FSH", "TOTALTESTOST", "PROGESTERONE", "ESTRADIOL", "XMLJTZNJ29MI", "IWJJQYVU44", "FERRITIN", "IRON", "TRANSFERRIN", "TIBC", "FESATURATED", "ZINC"    Objective:   Physical Exam   Physical Exam    General: No acute distress. Well-developed. Well-nourished.+ obesity  Eyes: EOMI. Sclerae anicteric.  HENT: Normocephalic. Atraumatic. Nares patent. Moist oral mucosa.  Ears: Bilateral TMs clear. Bilateral EACs clear.  Cardiovascular: Regular rate. Regular rhythm. No murmurs. No rubs. No gallops. Normal S1, S2.  Respiratory: Normal respiratory effort. Clear to auscultation bilaterally. No rales. No rhonchi. " No wheezing.  Abdomen: Soft. Non-tender. Non-distended. Normoactive bowel sounds.  Musculoskeletal: No  obvious deformity. Tenderness in ankle.  Extremities: No lower extremity edema.  Neurological: Alert & oriented x3. No slurred speech. Normal gait.  Psychiatric: Normal mood. Normal affect. Good insight. Good judgment.  Skin: Warm. Dry. No rash.         Assessment:     1. Uncontrolled type 2 diabetes mellitus with hyperglycemia, with long-term current use of insulin    2. Poorly controlled diabetes mellitus    3. Hypertension, unspecified type    4. Abdominal pain, unspecified abdominal location    5. Lower abdominal pain    6. Obesity (BMI 35.0-39.9 without comorbidity)    7. Metabolic syndrome    8. Hyperlipidemia LDL goal <130    9. Stage 3b chronic kidney disease    10. Diabetes mellitus due to underlying condition with stage 3b chronic kidney disease, unspecified whether long term insulin use    11. Diabetic polyneuropathy associated with type 2 diabetes mellitus    12. Urinary frequency    13. Normocytic anemia    14. Early hospital discharge assessment following delivery    15. Asthma, unspecified asthma severity, unspecified whether complicated, unspecified whether persistent      Plan:   Assessment & Plan   Uncontrolled type 2 diabetes mellitus with hyperglycemia, with long-term current use of insulin  -     Hemoglobin A1C and BMP; Future; Expected date: 03/18/2025        - continue Lantus insulin 20 units at night        - continue NovoLog insulin 3 units 3 times a day and q.8 hours as needed with blood sugars over 300       - continue glipizide 10 mg daily with breakfast       - D Cam for blood sugar monitoring  Poorly controlled diabetes mellitus  -     Ambulatory referral/consult to diabetic clinic by endocrinology  -     Comprehensive Metabolic Panel; Future; Expected date: 03/18/2025  -     Hemoglobin A1C; Future; Expected date: 03/18/2025  -     Diabetic Eye Screening Photo; Future    Hypertension,  unspecified type  -     controled  - continue amlodipine 5 mg daily and lisinopril 40 mg daily  - advised the patient taking low sodium diet and to do healthy lifestyle modification for overweight reduction    Abdominal pain, unspecified abdominal location  -     Ambulatory referral/consult to Internal Medicine       - improved and advised the patient stopped taking Bentyl and titrate controlled hypoglycemia associated with diabetes to avoid ketone acidosis and hyperglycemia  Lower abdominal pain    Obesity (BMI 35.0-39.9 without comorbidity)  - advised the patient to take a low-calorie diet low-fat diet low sugar diet and to do healthy lifestyle modification and try to do exercise tolerated for overweight reduction  Metabolic syndrome  - comprehensive treatment plan such as weight reduction, control hypertension, hyperlipidemia and diabetes  Hyperlipidemia LDL goal <130  -     Lipid Panel; Future; Expected date: 03/18/2025       - continue Lipitor 10 mg daily  Stage 3b chronic kidney disease  -     Comprehensive Metabolic Panel; Future; Expected date: 03/18/2025        - continue current insulin for diabetic control and continue lisinopril 40 mg daily for reducing diabetic nephropathy prognosis   Diabetes mellitus due to underlying condition with stage 3b chronic kidney disease, unspecified whether long term insulin use  -     Lipid Panel; Future; Expected date: 03/18/2025  -     Diabetic Eye Screening Photo; Future        - continue current insulin unknown normalized and follow-up diabetic clinic for better control uncontrolled diabetes  Diabetic polyneuropathy associated with type 2 diabetes mellitus  - continue Neurontin 200 mg 3 times a day for symptomatic treatment  - better controlled diabetic hyperlipidemia  Urinary frequency  -     Urinalysis; Future; Expected date: 03/18/2025    Normocytic anemia  -     CBC Auto Differential; Future; Expected date: 03/18/2025       -      check iron panel and  reticulocyte  Early hospital discharge assessment following delivery  -     Comprehensive Metabolic Panel; Future; Expected date: 03/18/2025       Assessment & Plan    IMPRESSION:  - Assessed recent hospitalization for UTI and kidney infection.  - Evaluated current glucose control, noting improvement from hospital levels but still suboptimal.  - Considered anemia as potential cause of fatigue, based on recent lab results.    TYPE 2 DIABETES MELLITUS WITH DIABETIC POLYNEUROPATHY:  - Explained that elevated glucose levels can be influenced by factors beyond diet, including pain and stress.  - Noted improvement in recent blood sugar readings (114 at night, 141 in morning) from previous levels as high as 300 at night.  - Instructed patient to check glucose levels before bedtime (10 PM).  - Continued Lantus 20 units at night and Novolog 3 units 3 times daily, with an additional 3 units at bedtime if glucose is elevated (around 300) at 10 PM check.  - Ordered A1C labs to assess current diabetes control, noting previous A1C was 14 when admitted to hospital.  - Referred patient to endocrinologist for specialized management and requested patient to schedule follow-up visit.  - Planned to prescribe Dexcom continuous glucose monitor for better management; requested patient to provide office with fax number for prescription.    OBESITY, MORBID:  - Acknowledged the need for nutritional counseling.    CHRONIC KIDNEY DISEASE, STAGE 3A:  - Reviewed recent hospitalization for kidney-related issues and UTI.  - Hospital tests confirmed no current infection but high blood sugar.  - Ordered urine and labs to assess current kidney function.  - Continued amlodipine for blood pressure management, which is important for kidney health.    URINARY FREQUENCY AND RULE OUT WITH POSSIBLE UNDERLYING URINARY TRACT INFECTION :  - Patient recently visited ER due to pain in stomach, side, and back, where a scan and urine sample were taken.  -  Diagnosed with UTI and potential kidney infection, leading to hospital admission.  - Patient reports no current urinary symptoms but still has some pain, though less severe than before.  - check urinalysis and WBC to rule out urinary tract infection       ASTHMA:  - Patient reports occasional asthma symptoms.  - Lung exam was clear.  - Continued Preventil for asthma management as needed.    ANEMIA:  - Ordered labs to check anemia status.  - Patient's anemia started one month ago.  - Patient reports feeling tired, which may be related to anemia.    POLYP NEUROPATHY OF LEFT LOWER LIMB:  - Patient reports tingling in foot after previous fracture.  - Continued medication OF NEURONTIN 200 mg 3 times a day for nerve pain.    PAIN IN ANKLE AND FOOT JOINTS:  - Patient has a history of foot fracture in 3 places.  - Examined ankle, which was tender.  - Continued Tylenol gel for joint pain management.    LONG TERM USE OF INSULIN AND DIABETES MANAGEMENT:  - Patient reports high blood sugar at night, sometimes reaching 300.  - Current blood sugar are improved but not ideal.  - Patient uses a Dexcom continuous glucose monitor.    MEDICATION SIDE EFFECTS:  - Discussed   potential side effects of Bentyl, including constipation.         Orders Placed This Encounter    Comprehensive Metabolic Panel    CBC Auto Differential    Hemoglobin A1C    Lipid Panel    Urinalysis    Ferritin    Iron and TIBC    Reticulocytes    Diabetic Eye Screening Photo       Follow up in about 4 weeks (around 4/15/2025).     Patient Instructions   Follow-up in her PCP clinic in 4 weeks by primary care physician and follow-up in diabetic clinic by endocrinologist in 4 weeks    [unfilled]   Tests to Keep You Healthy    Mammogram: Met on 12/27/2024  Eye Exam: SCHEDULED  Colon Cancer Screening: DUE  Last Blood Pressure <= 139/89 (3/18/2025): Yes  Last HbA1c < 8 (02/09/2025): NO      Visit Checklist (as applicable):  1. Status of new and prior  symptoms discussed? yes  2. Imaging reviewed/ ordered as appropriate? yes  3. Lab study reviewed/ ordered as appropriate? yes  4. Plan for work-up and treatment discussed with patient? yes  5. Potential medication side-effects and monitoring plan discussed? yes  6. Review of outside medical records was performed and pertinent details are summarized in the HPI above? yes     Time spent on this encounter: 60 minutes. This includes face to face time and non-face to face time preparing to see the patient (eg, review of tests), obtaining and/or reviewing separately obtained history, documenting clinical information in the electronic or other health record, independently interpreting results (not separately reported) and communicating results to the patient/family/caregiver, or care coordination (not separately reported). Also patient education regarding chronic and acute medical conditions reviewed. Patient handouts given if appropriate.     Each patient to whom he or she provides medical services by telemedicine is:  (1) informed of the relationship between the physician and patient and the respective role of any other health care provider with respect to management of the patient; and (2) notified that he or she may decline to receive medical services by telemedicine and may withdraw from such care at any time.     This note was generated with the assistance of ambient listening technology. Verbal consent was obtained by the patient and accompanying visitor(s) for the recording of patient appointment to facilitate this note. I attest to having reviewed and edited the generated note for accuracy, though some syntax or spelling errors may persist. Please contact the author of this note for any clarification.       Toño Martinez MD  [facility]                               [1]    Current Outpatient Medications:     albuterol (PROVENTIL/VENTOLIN HFA) 90 mcg/actuation inhaler, Inhale 1-2 puffs into the  "lungs every 6 (six) hours as needed for Wheezing or Shortness of Breath (Please dispense with spacer)., Disp: 6.7 g, Rfl: 0    aspirin (ECOTRIN) 81 MG EC tablet, Take 81 mg by mouth once daily., Disp: , Rfl:     dicyclomine (BENTYL) 10 MG capsule, Take 10 mg by mouth once daily., Disp: , Rfl:     gabapentin (NEURONTIN) 100 MG capsule, Take 2 capsules (200 mg total) by mouth 3 (three) times daily., Disp: 90 capsule, Rfl: 0    glipiZIDE (GLUCOTROL) 10 MG tablet, Take 1 tablet (10 mg total) by mouth daily with breakfast., Disp: 30 tablet, Rfl: 1    insulin aspart U-100 (NOVOLOG) 100 unit/mL (3 mL) InPn pen, Inject 3 Units into the skin 3 (three) times daily with meals. Check glucose before administering. Hold if blood sugar is less than 150., Disp: 15 mL, Rfl: 0    insulin glargine U-100, Lantus, 100 unit/mL (3 mL) SubQ InPn pen, Inject 20 Units into the skin every evening., Disp: 15 mL, Rfl: 0    ondansetron (ZOFRAN-ODT) 4 MG TbDL, Take 1 tablet (4 mg total) by mouth every 8 (eight) hours as needed., Disp: 12 tablet, Rfl: 0    amLODIPine (NORVASC) 5 MG tablet, Take 1 tablet (5 mg total) by mouth once daily. (Patient not taking: Reported on 3/18/2025), Disp: 90 tablet, Rfl: 0    atorvastatin (LIPITOR) 10 MG tablet, Take 10 mg by mouth once daily. (Patient not taking: Reported on 3/18/2025), Disp: , Rfl:     diclofenac sodium (VOLTAREN) 1 % Gel, Apply 2 g topically 3 (three) times daily as needed (muscle spasm pain). Apply 2 grams to affected area 3 times daily as needed, Disp: 100 g, Rfl: 0    LIDOcaine (LIDODERM) 5 %, Apply to affected area. Use as directed. May use 4% lidocaine patch as alternative. (Patient not taking: Reported on 3/18/2025), Disp: 15 patch, Rfl: 1    lisinopril (PRINIVIL,ZESTRIL) 40 MG tablet, Take 40 mg by mouth once daily. (Patient not taking: Reported on 3/18/2025), Disp: , Rfl:     pen needle, diabetic (INSUPEN PEN NEEDLE) 31 gauge x 5/16" Ndle, 1 each by Misc.(Non-Drug; Combo " Route) route 4 (four) times daily before meals and nightly., Disp: 100 each, Rfl: 1

## 2025-03-19 ENCOUNTER — RESULTS FOLLOW-UP (OUTPATIENT)
Dept: INTERNAL MEDICINE | Facility: CLINIC | Age: 70
End: 2025-03-19

## 2025-03-19 LAB
CHOLEST SERPL-MCNC: 213 MG/DL (ref 120–199)
CHOLEST/HDLC SERPL: 4.4 {RATIO} (ref 2–5)
FERRITIN SERPL-MCNC: 415 NG/ML (ref 20–300)
HDLC SERPL-MCNC: 48 MG/DL (ref 40–75)
HDLC SERPL: 22.5 % (ref 20–50)
IRON SERPL-MCNC: 100 UG/DL (ref 30–160)
LDLC SERPL CALC-MCNC: 144.6 MG/DL (ref 63–159)
NONHDLC SERPL-MCNC: 165 MG/DL
SATURATED IRON: 34 % (ref 20–50)
TOTAL IRON BINDING CAPACITY: 295 UG/DL (ref 250–450)
TRANSFERRIN SERPL-MCNC: 199 MG/DL (ref 200–375)
TRIGL SERPL-MCNC: 102 MG/DL (ref 30–150)

## 2025-04-01 DIAGNOSIS — E11.65 POORLY CONTROLLED DIABETES MELLITUS: Primary | ICD-10-CM

## 2025-04-01 NOTE — TELEPHONE ENCOUNTER
Refill Routing Note   Medication(s) are not appropriate for processing by [facility] for the following reason(s):        Non-participating provider    [facility] action(s):  Route               Appointments  past 12m or future 3m with PCP    Date Provider   Last Visit   3/18/2025 Toño Martinez MD   Next Visit   4/23/2025 Toño Martinez MD   ED visits in past 90 days: 1        Note composed:4:57 PM 04/01/2025

## 2025-04-02 ENCOUNTER — TELEPHONE (OUTPATIENT)
Dept: INTERNAL MEDICINE | Facility: CLINIC | Age: 70
End: 2025-04-02
Payer: MEDICARE

## 2025-04-02 RX ORDER — PEN NEEDLE, DIABETIC 30 GX3/16"
1 NEEDLE, DISPOSABLE MISCELLANEOUS
Qty: 100 EACH | Refills: 1 | Status: SHIPPED | OUTPATIENT
Start: 2025-04-02

## 2025-04-02 NOTE — TELEPHONE ENCOUNTER
----- Message from Med Assistant Oralia sent at 3/28/2025  3:33 PM CDT -----  Regarding: Dex Con Prescription  Good Afternoon, Patient requesting to be contacted in order to discuss receiving her Dex con prescription.Please call home number. Thanks

## 2025-04-09 ENCOUNTER — PATIENT OUTREACH (OUTPATIENT)
Dept: ADMINISTRATIVE | Facility: HOSPITAL | Age: 70
End: 2025-04-09
Payer: MEDICARE

## 2025-04-09 DIAGNOSIS — E11.9 TYPE 2 DIABETES MELLITUS WITHOUT COMPLICATION, WITHOUT LONG-TERM CURRENT USE OF INSULIN: Primary | ICD-10-CM

## 2025-04-23 ENCOUNTER — CLINICAL SUPPORT (OUTPATIENT)
Dept: INTERNAL MEDICINE | Facility: CLINIC | Age: 70
End: 2025-04-23
Attending: INTERNAL MEDICINE
Payer: MEDICARE

## 2025-04-23 ENCOUNTER — LAB VISIT (OUTPATIENT)
Dept: LAB | Facility: OTHER | Age: 70
End: 2025-04-23
Attending: INTERNAL MEDICINE
Payer: MEDICARE

## 2025-04-23 ENCOUNTER — OFFICE VISIT (OUTPATIENT)
Dept: INTERNAL MEDICINE | Facility: CLINIC | Age: 70
End: 2025-04-23
Payer: MEDICARE

## 2025-04-23 VITALS
SYSTOLIC BLOOD PRESSURE: 132 MMHG | HEART RATE: 89 BPM | OXYGEN SATURATION: 93 % | BODY MASS INDEX: 37.73 KG/M2 | DIASTOLIC BLOOD PRESSURE: 69 MMHG | WEIGHT: 205 LBS | HEIGHT: 62 IN

## 2025-04-23 DIAGNOSIS — M54.50 ACUTE LEFT-SIDED LOW BACK PAIN WITHOUT SCIATICA: ICD-10-CM

## 2025-04-23 DIAGNOSIS — E08.22 DIABETES MELLITUS DUE TO UNDERLYING CONDITION WITH STAGE 3B CHRONIC KIDNEY DISEASE, UNSPECIFIED WHETHER LONG TERM INSULIN USE: ICD-10-CM

## 2025-04-23 DIAGNOSIS — E11.9 TYPE 2 DIABETES MELLITUS WITHOUT COMPLICATION, WITHOUT LONG-TERM CURRENT USE OF INSULIN: ICD-10-CM

## 2025-04-23 DIAGNOSIS — E88.82 CLASS 2 OBESITY DUE TO DISRUPTION OF MC4R PATHWAY WITH SERIOUS COMORBIDITY AND BODY MASS INDEX (BMI) OF 37.0 TO 37.9 IN ADULT: ICD-10-CM

## 2025-04-23 DIAGNOSIS — Z87.81 HISTORY OF ANKLE FRACTURE: ICD-10-CM

## 2025-04-23 DIAGNOSIS — E11.65 UNCONTROLLED DIABETES MELLITUS WITH HYPERGLYCEMIA, WITH LONG-TERM CURRENT USE OF INSULIN: ICD-10-CM

## 2025-04-23 DIAGNOSIS — E01.0 THYROMEGALY: ICD-10-CM

## 2025-04-23 DIAGNOSIS — E08.22 DIABETES MELLITUS DUE TO UNDERLYING CONDITION WITH STAGE 3B CHRONIC KIDNEY DISEASE, WITH LONG-TERM CURRENT USE OF INSULIN: Primary | ICD-10-CM

## 2025-04-23 DIAGNOSIS — N18.32 DIABETES MELLITUS DUE TO UNDERLYING CONDITION WITH STAGE 3B CHRONIC KIDNEY DISEASE, UNSPECIFIED WHETHER LONG TERM INSULIN USE: ICD-10-CM

## 2025-04-23 DIAGNOSIS — Z79.4 UNCONTROLLED DIABETES MELLITUS WITH HYPERGLYCEMIA, WITH LONG-TERM CURRENT USE OF INSULIN: ICD-10-CM

## 2025-04-23 DIAGNOSIS — N18.32 STAGE 3B CHRONIC KIDNEY DISEASE: ICD-10-CM

## 2025-04-23 DIAGNOSIS — J45.909 ASTHMA, UNSPECIFIED ASTHMA SEVERITY, UNSPECIFIED WHETHER COMPLICATED, UNSPECIFIED WHETHER PERSISTENT: ICD-10-CM

## 2025-04-23 DIAGNOSIS — E11.42 DIABETIC POLYNEUROPATHY ASSOCIATED WITH TYPE 2 DIABETES MELLITUS: ICD-10-CM

## 2025-04-23 DIAGNOSIS — E66.812 CLASS 2 OBESITY DUE TO DISRUPTION OF MC4R PATHWAY WITH SERIOUS COMORBIDITY AND BODY MASS INDEX (BMI) OF 37.0 TO 37.9 IN ADULT: ICD-10-CM

## 2025-04-23 DIAGNOSIS — Z13.820 SCREENING FOR OSTEOPOROSIS: ICD-10-CM

## 2025-04-23 DIAGNOSIS — E11.65 POORLY CONTROLLED DIABETES MELLITUS: ICD-10-CM

## 2025-04-23 DIAGNOSIS — Z15.2 CLASS 2 OBESITY DUE TO DISRUPTION OF MC4R PATHWAY WITH SERIOUS COMORBIDITY AND BODY MASS INDEX (BMI) OF 37.0 TO 37.9 IN ADULT: ICD-10-CM

## 2025-04-23 DIAGNOSIS — M85.872 OTHER SPECIFIED DISORDERS OF BONE DENSITY AND STRUCTURE, LEFT ANKLE AND FOOT: ICD-10-CM

## 2025-04-23 DIAGNOSIS — K59.00 CONSTIPATION, UNSPECIFIED CONSTIPATION TYPE: ICD-10-CM

## 2025-04-23 DIAGNOSIS — N18.32 DIABETES MELLITUS DUE TO UNDERLYING CONDITION WITH STAGE 3B CHRONIC KIDNEY DISEASE, WITH LONG-TERM CURRENT USE OF INSULIN: Primary | ICD-10-CM

## 2025-04-23 DIAGNOSIS — Z79.4 DIABETES MELLITUS DUE TO UNDERLYING CONDITION WITH STAGE 3B CHRONIC KIDNEY DISEASE, WITH LONG-TERM CURRENT USE OF INSULIN: Primary | ICD-10-CM

## 2025-04-23 DIAGNOSIS — I10 PRIMARY HYPERTENSION: ICD-10-CM

## 2025-04-23 DIAGNOSIS — E78.5 HYPERLIPIDEMIA LDL GOAL <130: ICD-10-CM

## 2025-04-23 LAB
ALBUMIN/CREAT UR: 38.1 UG/MG
CREAT UR-MCNC: 63 MG/DL (ref 15–325)
MICROALBUMIN UR-MCNC: 24 UG/ML (ref ?–5000)

## 2025-04-23 PROCEDURE — 2024F 7 FLD RTA PHOTO EVC RTNOPTHY: CPT | Mod: CPTII,S$GLB,, | Performed by: OPTOMETRIST

## 2025-04-23 PROCEDURE — 82570 ASSAY OF URINE CREATININE: CPT

## 2025-04-23 PROCEDURE — 92228 IMG RTA DETC/MNTR DS PHY/QHP: CPT | Mod: 26,S$GLB,, | Performed by: OPTOMETRIST

## 2025-04-23 PROCEDURE — 99999 PR PBB SHADOW E&M-EST. PATIENT-LVL V: CPT | Mod: PBBFAC,,, | Performed by: INTERNAL MEDICINE

## 2025-04-23 RX ORDER — AMLODIPINE BESYLATE 10 MG/1
10 TABLET ORAL DAILY
Qty: 90 TABLET | Refills: 3 | Status: SHIPPED | OUTPATIENT
Start: 2025-04-23 | End: 2026-04-23

## 2025-04-23 RX ORDER — TIZANIDINE 2 MG/1
2 TABLET ORAL
Qty: 40 TABLET | Refills: 1 | Status: SHIPPED | OUTPATIENT
Start: 2025-04-23 | End: 2025-05-03

## 2025-04-23 NOTE — PROGRESS NOTES
Annie Matthews is a 69 y.o. female here for a diabetic eye screening with non-dilated fundus photos per Dr Martinez.    Patient cooperative?: Yes  Small pupils?: No  Last eye exam: unknown    For exam results, see Encounter Report.

## 2025-04-23 NOTE — PROGRESS NOTES
Subjective:      Patient ID: Annie Matthews is a 69 y.o. female.    Chief Complaint: Follow-up and Diabetes      History of Present Illness    CHIEF COMPLAINT:  Patient presents today for back spasms and pain.  The patient is a 69-year-old Black female who presents for follow-up of her annual wellness laboratory studies and management of multiple comorbidities, with a particular focus on her uncontrolled type 2 diabetes. Recent lab results show an improvement in glycemic control, with her HbA1c reduced from 14.0% to 11.9%. Although improved, her diabetes remains poorly controlled. The goal is to further reduce her HbA1c to below 7.0%.  Her lipid profile is well-controlled on atorvastatin (Lipitor) 10 mg daily, with normal triglycerides, LDL, and HDL levels. She denies any muscle pain and tolerates atorvastatin well. Comprehensive metabolic panel (CMP) results reveal normalized liver function compared to prior labs, but a decline in renal function with GFR decreasing from 55 to 44.  Complete blood count (CBC) results show resolution of previously noted anemia and normalization of WBC and platelet counts compared to two months ago.  All laboratory results were reviewed with the patient, and she was educated and counseled accordingly during todays visit.  New Complaints:  Back pain: She reports new-onset lower back pain, rated 5/10. She denies any recent trauma, history of disc disease, or prior injuries aside from one episode during a previous hospitalization.  Ankle pain and swelling: She continues to experience daily pain and swelling in her left ankle, which has a history of a prior fracture in three locations that required surgical intervention. Recently, the pain has worsened, affecting her ability to ambulate. She has attempted self-treatment with Epsom salt and alcohol soaks, which offered minimal relief.  Throat and neck symptoms: She reports throat itching and associated neck tenderness, describing a  "sensation of a "knot" in the area, which causes her to scratch for relief.  Osteoporosis concern: Due to her history of left ankle fracture and ongoing symptoms, she expresses concern about possible osteoporosis.  She went have diabetic eye examination today for evaluation of diabetic retinopathy.    MEDICATIONS:  She takes Lantus insulin 20 units nightly and 9 units divided into three doses with meals. She also takes Amlodipine 5 mg daily for blood pressure and Lipitor for cholesterol management.    BLOOD PRESSURE MANAGEMENT:  She has home blood pressure monitoring equipment but only checks when experiencing dizziness. She denies performing daily measurements.          ROS:  General: -fever, -chills, -fatigue, -weight gain, -weight loss  Eyes: -vision changes, -redness, -discharge  ENT: -ear pain, -nasal congestion, +sore throat  Cardiovascular: -chest pain, -palpitations, +lower extremity edema  Respiratory: -cough, -shortness of breath  Gastrointestinal: -abdominal pain, -nausea, -vomiting, -diarrhea, -constipation, -blood in stool  Genitourinary: -dysuria, -hematuria, -frequency  Musculoskeletal: +joint pain, -muscle pain, +muscle spasms, +back pain, +joint swelling, +limb swelling, +pain with movement, +limb pain  Skin: -rash, -lesion  Neurological: -headache, +dizziness, -numbness, -tingling  Psychiatric: -anxiety, -depression, -sleep difficulty         Active Problem List with Overview Notes    Diagnosis Date Noted    Abdominal pain 02/09/2025    Pyelonephritis 02/07/2025    Lumbar pain 12/14/2023    Gastroesophageal reflux disease without esophagitis 06/02/2020    Poorly controlled diabetes mellitus 08/22/2018    Bronchitis 05/05/2018    Wheezing 05/05/2018    Sinusitis 05/05/2018    Bilateral non-suppurative otitis media 05/05/2018    Hypertension 07/22/2015    Valvular heart disease 05/21/2010        MEDICATIONS:  Current Medications[1]    Current Outpatient Medications:     albuterol (PROVENTIL/VENTOLIN " "HFA) 90 mcg/actuation inhaler, Inhale 1-2 puffs into the lungs every 6 (six) hours as needed for Wheezing or Shortness of Breath (Please dispense with spacer)., Disp: 6.7 g, Rfl: 0    aspirin (ECOTRIN) 81 MG EC tablet, Take 81 mg by mouth once daily., Disp: , Rfl:     blood-glucose sensor (DEXCOM G6 SENSOR) Gracie, Patient to check blood sugar up to every 5 minutes, Disp: 10 each, Rfl: 3    dicyclomine (BENTYL) 10 MG capsule, Take 10 mg by mouth once daily., Disp: , Rfl:     gabapentin (NEURONTIN) 100 MG capsule, Take 2 capsules (200 mg total) by mouth 3 (three) times daily., Disp: 90 capsule, Rfl: 0    glipiZIDE (GLUCOTROL) 10 MG tablet, Take 1 tablet (10 mg total) by mouth daily with breakfast., Disp: 30 tablet, Rfl: 1    insulin aspart U-100 (NOVOLOG) 100 unit/mL (3 mL) InPn pen, Inject 3 Units into the skin 3 (three) times daily with meals. Check glucose before administering. Hold if blood sugar is less than 150., Disp: 15 mL, Rfl: 0    insulin glargine U-100, Lantus, 100 unit/mL (3 mL) SubQ InPn pen, Inject 20 Units into the skin every evening., Disp: 15 mL, Rfl: 0    lactulose (CHRONULAC) 10 gram/15 mL solution, take 15ml (1 tablespoonful) by mouth as directed for test preperation, Disp: 15 mL, Rfl: 0    lisinopril (PRINIVIL,ZESTRIL) 40 MG tablet, Take 40 mg by mouth once daily., Disp: , Rfl:     ondansetron (ZOFRAN-ODT) 4 MG TbDL, Take 1 tablet (4 mg total) by mouth every 8 (eight) hours as needed., Disp: 12 tablet, Rfl: 0    pen needle, diabetic (BD ULTRA-FINE SHORT PEN NEEDLE) 31 gauge x 5/16" Ndle, 1 each by Misc.(Non-Drug; Combo Route) route 4 (four) times daily before meals and nightly., Disp: 100 each, Rfl: 1    amLODIPine (NORVASC) 10 MG tablet, Take 1 tablet (10 mg total) by mouth once daily., Disp: 90 tablet, Rfl: 3    atorvastatin (LIPITOR) 10 MG tablet, Take 10 mg by mouth once daily. (Patient not taking: Reported on 4/23/2025), Disp: , Rfl:     diclofenac sodium (VOLTAREN) 1 % Gel, Apply 2 g " "topically 3 (three) times daily as needed (muscle spasm pain). Apply 2 grams to affected area 3 times daily as needed, Disp: 100 g, Rfl: 0    LIDOcaine (LIDODERM) 5 %, Apply to affected area. Use as directed. May use 4% lidocaine patch as alternative. (Patient not taking: Reported on 4/23/2025), Disp: 15 patch, Rfl: 1    tiZANidine (ZANAFLEX) 2 MG tablet, Take 1 tablet (2 mg total) by mouth every 6 to 8 hours as needed. Do not stop abruptly if taking regularly for > 2 weeks, Disp: 40 tablet, Rfl: 1  ALLERGIES:  Review of patient's allergies indicates:   Allergen Reactions    Strawberry Hives and Itching    Sulfa (sulfonamide antibiotics)     Tomato Hives    Codeine Nausea And Vomiting        Past Medical History:   Diagnosis Date    Atrial fibrillation     Diabetes mellitus     Diabetes mellitus, type 2     Hypertension      Past Surgical History:   Procedure Laterality Date    HYSTERECTOMY       Social History     Social History Narrative    Not on file     Family History   Problem Relation Name Age of Onset    Heart failure Mother      Heart attack Father      Cancer Sister         Vitals:    04/23/25 1356 04/23/25 1423   BP: (!) 146/69 132/69   Pulse: 89    SpO2: (!) 93%    Weight: 93 kg (205 lb 0.4 oz)    Height: 5' 2" (1.575 m)    PainSc:   6    PainLoc: Foot        Review of Systems     Lab Results   Component Value Date    HGBA1C 11.9 (H) 03/18/2025    HGBA1C 14.0 (H) 02/09/2025    HGBA1C 14.0 (H) 02/08/2025     No results found for: "MICALBCREAT"  Lab Results   Component Value Date    LDLCALC 144.6 03/18/2025    LDLCALC 158 (H) 04/02/2024    CHOL 213 (H) 03/18/2025    HDL 48 03/18/2025    TRIG 102 03/18/2025       Lab Results   Component Value Date     03/18/2025    K 5.2 (H) 03/18/2025     03/18/2025    CO2 27 03/18/2025     03/18/2025    BUN 33 (H) 03/18/2025    CREATININE 1.3 03/18/2025    CALCIUM 9.2 03/18/2025    PROT 7.5 03/18/2025    ALBUMIN 3.5 03/18/2025    BILITOT 0.4 03/18/2025 "    ALKPHOS 78 03/18/2025    AST 17 03/18/2025    ALT 27 03/18/2025    ANIONGAP 5 (L) 03/18/2025    ESTGFRAFRICA >60 12/21/2021    EGFRNONAA 53 (A) 12/21/2021    WBC 9.65 03/18/2025    HGB 12.4 03/18/2025    HGB 10.9 (L) 02/09/2025    HCT 40.8 03/18/2025    MCV 90 03/18/2025     03/18/2025    TSH 4.88 04/09/2021    HEPCAB Negative 02/01/2025       Lab Results   Component Value Date    FERRITIN 415 (H) 03/18/2025    IRON 100 03/18/2025    TRANSFERRIN 199 (L) 03/18/2025    TIBC 295 03/18/2025    FESATURATED 34 03/18/2025       Objective:   Physical Exam   Physical Exam    General: No acute distress. Well-developed. Well-nourished.+ obesity  Eyes: EOMI. Sclerae anicteric.  HENT: Normocephalic. Atraumatic. Nares patent. Moist oral mucosa. Pharyngeal tenderness.  Ears: Bilateral TMs clear. Bilateral EACs clear.  Cardiovascular: Regular rate. Regular rhythm. No murmurs. No rubs. No gallops. Normal S1, S2.  Respiratory: Normal respiratory effort. Clear to auscultation bilaterally. No rales. No rhonchi. No wheezing.  Abdomen: Soft. Non-tender. Non-distended. Normoactive bowel sounds.  Musculoskeletal: No  obvious deformity.+ left side paraspinal muscle spasm and tenderness  Extremities: No lower extremity edema. Swollen ankle.  Neurological: Alert & oriented x3. No slurred speech. Normal gait.  Psychiatric: Normal mood. Normal affect. Good insight. Good judgment.  Skin: Warm. Dry. No rash. Scar on ankle.         Assessment:     1. Diabetes mellitus due to underlying condition with stage 3b chronic kidney disease, with long-term current use of insulin    2. Stage 3b chronic kidney disease    3. Uncontrolled diabetes mellitus with hyperglycemia, with long-term current use of insulin    4. 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 Thyromegaly    6. Acute left-sided low back pain without sciatica    7. Primary hypertension    8.  Diabetic polyneuropathy associated with type 2 diabetes mellitus    9. Hyperlipidemia LDL goal <130    10. Constipation, unspecified constipation type    11. Asthma, unspecified asthma severity, unspecified whether complicated, unspecified whether persistent    12. History of ankle fracture    13. Screening for osteoporosis    14. Other specified disorders of bone density and structure, left ankle and foot      Plan:   Assessment & Plan   Diabetes mellitus due to underlying condition with stage 3b chronic kidney disease, with long-term current use of insulin  - educated counseled patient's for diabetic kidney disease management plan  - encouraged the patient drink more liquid to avoid dehydration  - tight control diabetes and better controlled hypertension  Stage 3b chronic kidney disease  - educated counseled patient's for diabetic kidney disease management plan  - encouraged the patient drink more liquid to avoid dehydration  - tight control diabetes and better controlled hypertension  Uncontrolled diabetes mellitus with hyperglycemia, with long-term current use of insulin  -A1c level 11.9 reduced from 14  - continue taking glipizide 10 mg daily  - increased dose of Lantus from 20-25 units daily for better control diabetes  - will repeat A1c level in next visit  - educated and counseled patient's for taking low glucose diet and low-calorie diet for overweight reduction  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 Do not stop abruptly if taking regularly for > 2 weeks  Dispense: 40 tablet; Refill: 1  - educated and counseled patient today for lower back management as see education material provided below  Primary hypertension  -     increased dose of amLODIPine (NORVASC) for 5 mg to  10 MG tablet; Take 1 tablet (10 mg total) by mouth once daily.  Dispense: 90 tablet; Refill: 3  - continue lisinopril 40 mg daily  - the goal was keep a systolic blood pressure 120/130 and diastolic blood pressure 70-80 for better slow down diabetic kidney disease progression  Diabetic polyneuropathy associated with type 2 diabetes mellitus  - continue taking Neurontin 100 mg daily  Hyperlipidemia LDL goal <130  - well-controlled continue taking current Lipitor 10 mg daily and taking low-fat diet  Constipation, unspecified constipation type  - continue taking lactulose 15 cc daily as needed  Asthma, unspecified asthma severity, unspecified whether complicated, unspecified whether persistent  - in remission  - continue using albuterol inhalers 2 puffs Q 6 hours as needed  History of ankle fracture  -     DXA Bone Density Axial Skeleton 1 or more sites; Future; Expected date: 04/23/2025    Screening for osteoporosis  -     DXA Bone Density Axial Skeleton 1 or more sites; Future; Expected date: 04/23/2025    Other specified disorders of bone density and structure, left ankle and foot  -     DXA Bone Density Axial Skeleton 1 or more sites; Future; Expected date: 04/23/2025       Assessment & Plan    E66.01 Obesity, morbid  I48.91 Unspecified atrial fibrillation  E11.65 Type 2 diabetes mellitus with hyperglycemia  I10 Essential (primary) hypertension  E78.5 Hyperlipidemia, unspecified  N18.9 Chronic kidney disease, unspecified  M54.50 Low back pain, unspecified  M25.572 Pain in left ankle and joints of left foot  S99.919S Unspecified injury  of unspecified ankle, sequela  R07.0 Pain in throat  E87.5 Hyperkalemia  Z79.4 Long term (current) use of insulin  Z86.2 Personal history of diseases of the blood and blood-forming organs and certain disorders involving the immune mechanism    IMPRESSION:  Anemia improved to normal levels.  Cholesterol levels improved but still abnormal.  Renal function declined compared to two months ago.  Liver function tests improved.  Blood sugar levels improved but not yet optimal.  BP remains elevated despite current medication.  Back pain and spasms reported.  Swelling in surgically repaired ankle.  Lump in throat area, recommending further investigation.    OBESITY, MORBID:  - Acknowledged patient's weight loss efforts and encouraged continued progress.  - Noted improvement in cholesterol levels from 300 to 213, though still abnormal.  - Observed normalization of triglycerides from previously high levels of 300-200, and normal bad cholesterol levels, indicating effectiveness of Lipitor.  - Recommend continuation of Lipitor for cholesterol management and advised ongoing efforts to lose weight.    UNSPECIFIED ATRIAL FIBRILLATION:  - Noted irregular blood pressure monitoring at home, with patient only checking when feeling dizzy despite having a blood pressure kit.  - Current blood pressure remains elevated despite medication (amlodipine 5mg daily).  - Increased amlodipine dosage from 5mg to 10mg daily for better blood pressure control.  - Will provide a new prescription for the increased medication.    TYPE 2 DIABETES MELLITUS:  - Blood sugar levels have improved from 14 to 11, 9 in the past 2 months, but further improvement is needed.  - Current insulin regimen: 20 units of Lantus at night and 9 units in 3 separate doses during the day.  - Increased Lantus insulin from 20 units to 25 units at night.    HYPERTENSION:  - Blood pressure remains slightly elevated despite current medication.    HYPERLIPIDEMIA:  - Cholesterol  levels have improved from 300 four years ago to 213 currently, with normalized triglycerides and LDL.    CHRONIC KIDNEY DISEASE:  - Noted decline in kidney function compared to 2 months ago, with mildly elevated potassium levels.  - Decreased kidney function may be related to inadequate hydration.  - Explained importance of hydration and its relationship to potassium levels and renal function.  - Recommend increasing water intake.    LOW BACK PAIN:  - Patient reports current back pain and occasional spasms, with pain level at 5 out of 10.  - No history of back injury or disc problems.  - Prescribed muscle relaxants for pain management.    LEFT ANKLE PAIN AND INJURY:  - Patient reports persistent swelling and pain in left ankle, with history of fracture in 3 places and surgical intervention.  - Observed swelling and surgical scar.  - Patient uses Epsom salt and green alcohol for pain relief.  - Suggested need for physical therapy and bone density test.    THROAT PAIN:  - Patient reports throat itching and tenderness.  - Examination found throat to be fine, but detected a small lump in the area.  - Ordered thyroid ultrasound to evaluate observed lump.    HYPERKALEMIA:  - Mildly elevated potassium levels associated with kidney function.  - Management addressed under Chronic Kidney D  isease section.    HISTORY OF ANEMIA:  - Patient had anemia 2 months ago.  - Current labs show resolution of anemia with normal iron levels.    FOLLOW-UP:  - Patient to continue drinking plenty of water to support renal function and continue efforts to lose weight.         Orders Placed This Encounter    DXA Bone Density Axial Skeleton 1 or more sites    US Soft Tissue Head Neck    T4, Free    TSH    tiZANidine (ZANAFLEX) 2 MG tablet    amLODIPine (NORVASC) 10 MG tablet       Follow up in about 6 weeks (around 6/4/2025).     There are  Patient Instructions on file for this visit.  [unfilled]   Patient Education     Low back pain in  "adults   The Basics   Written by the doctors and editors at [facility]   How worried should I be about low back pain? --   Almost everyone gets back pain at some point. But even when the pain is severe, it usually goes away on its own within a few weeks. It is rare to need urgent care or surgery.  See your doctor or nurse if you have back pain and you:   Recently had a fall or an injury to your back   Have numbness or weakness in your legs   Have problems with bladder or bowel control   Have unexplained weight loss   Have a fever or feel sick in other ways   Take steroid medicine, such as prednisone, regularly   Have diabetes or a medical problem that weakens your immune system   Have a history of cancer or osteoporosis  You should also see a doctor if your back pain:   Is so severe you cannot do simple tasks   Does not start to improve within 4 weeks  What are the parts of the back? --   The back is made up of (figure 1):   Vertebrae - These are the bones of the spine. Each has a hole in the center. The vertebrae are stacked on top of each other, and the holes form a hollow tube called the "spinal canal." The spinal cord passes through this tube and is protected by the vertebrae.   Spinal cord and nerves - The spinal cord is the bundle of nerves that connects the brain to the rest of the body. It runs through the vertebrae. Nerves branch from the spinal cord and pass in between the vertebrae. From there, they connect to the arms, legs, and organs.   Discs - Rubbery discs sit in between each of the vertebrae. They add cushion and allow movement.   Muscles, tendons, and ligaments - These support the vertebrae and are used to stand upright as well as bend and flex the body. They are also called the "soft tissues" of the neck and back.  What causes low back pain? --   Many different things can cause low back pain. Doctors usually do not know the exact cause.  Back pain can happen if you strain a muscle. This is often " "what happens when a person "throws out" their back. This means pain that starts suddenly after physical activity, like lifting something heavy or bending over.  Back pain can also happen if you have:   Damaged, bulging, or torn discs   Arthritis affecting the joints of the spine   Bony growths on the vertebrae that crowd nearby nerves   A vertebra out of place   Narrowing in the spinal canal   A tumor or infection (but this is very rare)  Will I need tests? --   Not usually. Most cases of back pain go away within a few weeks. Doctors typically only order an imaging test if there are signs of something unusual. Imaging tests create pictures of the inside of the body.  The doctor will examine you and ask about your symptoms. This tells your doctor or nurse a lot about the cause of your pain. For example:   If the pain started after you did something specific, like lifting a heavy object or twisting your back, you might have a muscle strain.   If the pain spreads down the back of 1 thigh, 1 of the nerves that go to your leg might be getting pinched by a bulging or torn disc.   If your pain goes all of the way down both legs, you might have a narrowed spinal canal. This is most often due to bony growths on the spine.  How is back pain treated? --   Most people with an episode of low back pain do not have a serious medical problem, and can try simple treatments like:   Staying active - The best thing you can do is stay as active as possible. People with low back pain recover faster if they stay active. If your pain is severe, you might need to rest for a day or 2. But it's important to get back to walking and moving as soon as possible. While you should avoid heavy lifting and sports while your back hurts, try to keep doing your normal daily activities.   Heat - It might help to use a heating pad or heated wrap. But avoid high heat settings to prevent skin burns.   Medicines - You can try pain medicines that you can get " "without a prescription. Doctors usually suggest first trying a nonsteroidal antiinflammatory drug, or "NSAID." These include ibuprofen (sample brand names: Advil, Motrin) and naproxen (sample brand name: Aleve). They might work better than acetaminophen (sample brand name: Tylenol) for back pain.  If non-prescription medicines do not help, tell your doctor or nurse. Doctors can prescribe a medicine to relax the muscles (called a "muscle relaxant"). But these are not generally used in people older than 65. In older people, they can cause side effects such as trouble urinating or confusion.   Treatments to help with symptoms - Some treatments might help you feel better for a little while. They include:   Spinal manipulation - This is when a chiropractor, physical therapist, or other professional moves or "adjusts" the joints of your back. If you want to try this, talk to your doctor or nurse first.   Acupuncture - This is when someone who knows traditional Chinese medicine inserts tiny needles into your body to block pain signals.   Massage - A massage therapist massages the muscles and other soft tissues in your back.  While back pain usually goes away within a few weeks, some people have pain for longer. In this case, additional treatments might include:   Self-care - This involves being aware of your pain. Rest when you need to, but it's important to stay active as much as you can. Things like applying heat and doing gentle stretches can help you feel better, too.   Physical therapy - A physical therapist is an exercise expert who can teach you stretches and movements to help strengthen your muscles. The goal is to relieve pain and help you get back to your normal activities.  You can try walking, swimming, or using an exercise bike. Some people also find that jarne chi or yoga helps with back pain. Finding activities you enjoy can help you stay active.   Reducing stress - It might help to try "mindfulness-based " "stress reduction." This involves going to a group program to practice relaxation and meditation. If your back pain is making you feel anxious or depressed, talk to your doctor or nurse. There are other treatments that can help with these problems.  Some people wonder if injections (shots) can help to relieve back pain. In some cases, doctors might recommend a shot of medicine to numb the area or reduce swelling. But this has only been proven to work in specific situations.  Only a small number of people need surgery to treat back pain.  How can I prevent getting back pain again? --   The best thing you can do is to stay active. Doing exercises to strengthen and stretch your back can help. You should also:   Learn to lift using your legs instead of your back.   Avoid sitting or standing in the same position for too long.  Having back pain can be frustrating and scary. But it can help to know that doing these things can lower your risk of having another episode.  All topics are updated as new evidence becomes available and our peer review process is complete.  This topic retrieved from [employer] on: Aug 16, 2024.  Topic 54779 Version 28.0  Release: 32.6.2 - C32.227  © 2024 UpToDate, Inc. and/or its affiliates. All rights reserved.  figure 1: Anatomy of the back     This drawing shows the different parts of the back. Back pain can be caused by problems with the muscles, ligaments, discs, bones (vertebrae), or nerves.  Graphic 27141 Version 6.0  Consumer Information Use and Disclaimer   Disclaimer: This generalized information is a limited summary of diagnosis, treatment, and/or medication information. It is not meant to be comprehensive and should be used as a tool to help the user understand and/or assess potential diagnostic and treatment options. It does NOT include all information about conditions, treatments, medications, side effects, or risks that may apply to a specific patient. It is not intended to be medical " advice or a substitute for the medical advice, diagnosis, or treatment of a health care provider based on the health care provider's examination and assessment of a patient's specific and unique circumstances. Patients must speak with a health care provider for complete information about their health, medical questions, and treatment options, including any risks or benefits regarding use of medications. This information does not endorse any treatments or medications as safe, effective, or approved for treating a specific patient. UpToDate, Inc. and its affiliates disclaim any warranty or liability relating to this information or the use thereof.The use of this information is governed by the Terms of Use, available at https://www.gDecide.com/en/know/clinical-effectiveness-terms. 2024© UpToDate, Inc. and its affiliates and/or licensors. All rights reserved.  Copyright   © 2024 UpToDate, Inc. and/or its affiliates. All rights reserved.      Visit Checklist (as applicable):  1. Status of new and prior symptoms discussed? yes  2. Imaging reviewed/ ordered as appropriate? yes  3. Lab study reviewed/ ordered as appropriate? yes  4. Plan for work-up and treatment discussed with patient? yes  5. Potential medication side-effects and monitoring plan discussed? yes  6. Review of outside medical records was performed and pertinent details are summarized in the HPI above? yes     Time spent on this encounter: 45 minutes. This includes face to face time and non-face to face time preparing to see the patient (eg, review of tests), obtaining and/or reviewing separately obtained history, documenting clinical information in the electronic or other health record, independently interpreting results (not separately reported) and communicating results to the patient/family/caregiver, or care coordination (not separately reported). Also patient education regarding chronic and acute medical conditions reviewed. Patient handouts given  if appropriate.     Each patient to whom he or she provides medical services by telemedicine is:  (1) informed of the relationship between the physician and patient and the respective role of any other health care provider with respect to management of the patient; and (2) notified that he or she may decline to receive medical services by telemedicine and may withdraw from such care at any time.     This note was generated with the assistance of ambient listening technology. Verbal consent was obtained by the patient and accompanying visitor(s) for the recording of patient appointment to facilitate this note. I attest to having reviewed and edited the generated note for accuracy, though some syntax or spelling errors may persist. Please contact the author of this note for any clarification.       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 The patient will return to see me as these issues will be followed longitudinally.                     [1]   Current Outpatient Medications:     albuterol (PROVENTIL/VENTOLIN HFA) 90 mcg/actuation inhaler, Inhale  "1-2 puffs into the lungs every 6 (six) hours as needed for Wheezing or Shortness of Breath (Please dispense with spacer)., Disp: 6.7 g, Rfl: 0    aspirin (ECOTRIN) 81 MG EC tablet, Take 81 mg by mouth once daily., Disp: , Rfl:     blood-glucose sensor (DEXCOM G6 SENSOR) Gracie, Patient to check blood sugar up to every 5 minutes, Disp: 10 each, Rfl: 3    dicyclomine (BENTYL) 10 MG capsule, Take 10 mg by mouth once daily., Disp: , Rfl:     gabapentin (NEURONTIN) 100 MG capsule, Take 2 capsules (200 mg total) by mouth 3 (three) times daily., Disp: 90 capsule, Rfl: 0    glipiZIDE (GLUCOTROL) 10 MG tablet, Take 1 tablet (10 mg total) by mouth daily with breakfast., Disp: 30 tablet, Rfl: 1    insulin aspart U-100 (NOVOLOG) 100 unit/mL (3 mL) InPn pen, Inject 3 Units into the skin 3 (three) times daily with meals. Check glucose before administering. Hold if blood sugar is less than 150., Disp: 15 mL, Rfl: 0    insulin glargine U-100, Lantus, 100 unit/mL (3 mL) SubQ InPn pen, Inject 20 Units into the skin every evening., Disp: 15 mL, Rfl: 0    lactulose (CHRONULAC) 10 gram/15 mL solution, take 15ml (1 tablespoonful) by mouth as directed for test preperation, Disp: 15 mL, Rfl: 0    lisinopril (PRINIVIL,ZESTRIL) 40 MG tablet, Take 40 mg by mouth once daily., Disp: , Rfl:     ondansetron (ZOFRAN-ODT) 4 MG TbDL, Take 1 tablet (4 mg total) by mouth every 8 (eight) hours as needed., Disp: 12 tablet, Rfl: 0    pen needle, diabetic (BD ULTRA-FINE SHORT PEN NEEDLE) 31 gauge x 5/16" Ndle, 1 each by Misc.(Non-Drug; Combo Route) route 4 (four) times daily before meals and nightly., Disp: 100 each, Rfl: 1    amLODIPine (NORVASC) 10 MG tablet, Take 1 tablet (10 mg total) by mouth once daily., Disp: 90 tablet, Rfl: 3    atorvastatin (LIPITOR) 10 MG tablet, Take 10 mg by mouth once daily. (Patient not taking: Reported on 4/23/2025), Disp: , Rfl:     diclofenac sodium (VOLTAREN) 1 % Gel, Apply 2 g topically 3 (three) times daily as needed " (muscle spasm pain). Apply 2 grams to affected area 3 times daily as needed, Disp: 100 g, Rfl: 0    LIDOcaine (LIDODERM) 5 %, Apply to affected area. Use as directed. May use 4% lidocaine patch as alternative. (Patient not taking: Reported on 4/23/2025), Disp: 15 patch, Rfl: 1    tiZANidine (ZANAFLEX) 2 MG tablet, Take 1 tablet (2 mg total) by mouth every 6 to 8 hours as needed. Do not stop abruptly if taking regularly for > 2 weeks, Disp: 40 tablet, Rfl: 1

## 2025-04-24 ENCOUNTER — RESULTS FOLLOW-UP (OUTPATIENT)
Dept: INTERNAL MEDICINE | Facility: CLINIC | Age: 70
End: 2025-04-24

## 2025-04-25 NOTE — PROGRESS NOTES
"Subjective:      Patient ID: Annie Matthews is a 69 y.o. female.    Chief Complaint:  Diabetes    History of Present Illness  Annie Matthews is a 69 y.o. female with history significant for HTN, valvular heart disease, T2DM, GERD and is here for evaluation and management of T2DM.  This is their first visit with me.      She was seen in the ED 2025 for hyperglycemia and abdominal pain found to be pyelonephritis. She was treated with IV antibiotics and MDI.    With regards to type 2 diabetes:    Diagnosed: approximately   FH: sister  Known complications:  DKA : maricruz  RN : maricruz   Eye Exam: : 2025;  no laser surgery or DR  PN : maricruz- left foot numbness  Foot exam: Most Recent Foot Exam Date: Not Found  Nephropathy : denies  CAD : denies but has history of A-fib with a stent placed  Denies history of pancreatitis & personal/family history of medullary thyroid cancer.     Current regimen:  Glipizide 10 mg daily  Lantus 20 units nightly (had headaches on Lantus 25 units)  Novolog 6 units with meals    Compliant  Rotating injection sites. Denies contusions or lipohypertrophy.  Bolus timin minutes before meals    Other medications tried:  Jardiance- frequent UTI and yeast infections  Metformin- diarrhea    Glucose Monitor: Dexcom G7  >4 times a day testing  Log reviewed: unable to connect patient to clinic account and view Dexcom report  Viewed blood sugar trends in the past 24 hours on patient's phone  Majority of blood glucose "high" with <1% TIR    Hypoglycemia:  Denies  Knows how to correct with 15 grams of carbs- juice, coke, or a peppermint.     Symptoms:  Polyuria denies  Polydipsia denies  Unintentional/unexplained weight changes : denies  Vision changes : denies  Snoring +     Diet/Exercise:  Eats 2 meals a day (skips breakfast). Avoids fried foots  Breakfast: hardboiled eggs and lane  Lunch: cheese and crackers   Dinner: chicken and vegetables (corn, cabbage, spinach)  Snacks : " "chips, cashews, watermelon, grapes, melon  Drinks : water, sweet tea and lemonade   Exercise : denies due to ankle pain  Recent illness, injury, steroids: denies  Allergic to strawberries    Diabetes education: denies    Diabetes Management Status    Hemoglobin A1C   Date Value Ref Range Status   03/18/2025 11.9 (H) 4.0 - 5.6 % Final     Comment:     ADA Screening Guidelines:  5.7-6.4%  Consistent with prediabetes  >or=6.5%  Consistent with diabetes    High levels of fetal hemoglobin interfere with the HbA1C  assay. Heterozygous hemoglobin variants (HbS, HgC, etc)do  not significantly interfere with this assay.   However, presence of multiple variants may affect accuracy.     02/09/2025 14.0 (H) 4.0 - 5.6 % Final     Comment:     ADA Screening Guidelines:  5.7-6.4%  Consistent with prediabetes  >or=6.5%  Consistent with diabetes    High levels of fetal hemoglobin interfere with the HbA1C  assay. Heterozygous hemoglobin variants (HbS, HgC, etc)do  not significantly interfere with this assay.   However, presence of multiple variants may affect accuracy.     02/08/2025 14.0 (H) 4.0 - 5.6 % Final     Comment:     ADA Screening Guidelines:  5.7-6.4%  Consistent with prediabetes  >or=6.5%  Consistent with diabetes    High levels of fetal hemoglobin interfere with the HbA1C  assay. Heterozygous hemoglobin variants (HbS, HgC, etc)do  not significantly interfere with this assay.   However, presence of multiple variants may affect accuracy.         Statin: Taking Atorvastatin 10 mg- stopped taking due to "normal" cholesterol  ACE/ARB: Taking Lisinopril 40 mg  Screening or Prevention Patient's value Goal Complete/Controlled?   HgA1C Testing and Control   Lab Results   Component Value Date    HGBA1C 11.9 (H) 03/18/2025      Annually/Less than 8% No   Lipid profile : 03/18/2025 Annually Yes   LDL control Lab Results   Component Value Date    LDLCALC 144.6 03/18/2025    Annually/Less than 100 mg/dl  No   Nephropathy screening Lab " Results   Component Value Date    LABMICR 24.0 04/23/2025     Lab Results   Component Value Date    PROTEINUA Trace (A) 03/18/2025    Annually Yes   Blood pressure BP Readings from Last 1 Encounters:   05/14/25 122/86    Less than 140/90 Yes   Dilated retinal exam : 04/23/2025 Annually Yes   Foot exam   Most Recent Foot Exam Date: Not Found Annually No     FIB-4 Calculation: 0.74 at 3/18/2025  3:06 PM  Calculated from:  SGOT/AST: 17 U/L at 3/18/2025  3:06 PM  SGPT/ALT: 27 U/L at 3/18/2025  3:06 PM  Platelets: 298 K/uL at 3/18/2025  3:06 PM  Age: 69 years     FIB-4 below 1.30 is considered as low-risk for advanced fibrosis  FIB-4 over 2.67 is considered as high-risk for advanced fibrosis  FIB-4 values between 1.30 and 2.67 are considered as intermediate-risk of advanced fibrosis for ages 36-64.     For ages > 64 the cut-off for low-risk goes to < 2.  This is a screening tool and clinical judgement should be used in the interpretation of these results.    Kidney Failure Risk Equation (Tangri)    Kidney Failure Risk at 2 years: 0.4%    Kidney Failure Risk at 5 years: 1.4%    Lab Results   Component Value Date    MICALBCREAT 38.1 (H) 04/23/2025    CREATININE 1.3 03/18/2025       With regards to osteopenia:    BMD 05/01/2025 ([facility])  The L1 to L4 vertebral bone mineral density is equal to 1.487 g/cm squared with a T score of 2.4.     The left femoral neck bone mineral density is equal to 0.888 g/cm squared with a T score of -1.1.     The right femoral neck bone mineral density is equal to 0.871 g/cm squared with a T score of -1.2.     The total hip bone mineral density is equal to 1.091 g/cm squared with a T score of 0.7.     There is a 5.9% risk of a major osteoporotic fracture and a 0.5% risk of hip fracture in the next 10 years (FRAX).     Impression:  Osteopenia.        Review of Systems  As above    Objective:   Physical Exam  Constitutional:       Appearance: Normal appearance. She is obese.   Cardiovascular:      " Rate and Rhythm: Normal rate.      Comments: No edema  Pulmonary:      Effort: Pulmonary effort is normal.   Musculoskeletal:      Comments: Dexcom G7 on lateral left arm   Neurological:      Mental Status: She is alert.   Psychiatric:         Mood and Affect: Mood normal.         Behavior: Behavior normal.         Visit Vitals  /86 (BP Location: Left arm, Patient Position: Sitting)   Ht 5' 2" (1.575 m)   Wt 94.3 kg (208 lb 0.1 oz)   BMI 38.04 kg/m²       Body mass index is 38.04 kg/m².    Lab Review:   Lab Results   Component Value Date    HGBA1C 11.9 (H) 03/18/2025    HGBA1C 14.0 (H) 02/09/2025    HGBA1C 14.0 (H) 02/08/2025       Lab Results   Component Value Date    CHOL 213 (H) 03/18/2025    HDL 48 03/18/2025    LDLCALC 144.6 03/18/2025    TRIG 102 03/18/2025    CHOLHDL 22.5 03/18/2025     Lab Results   Component Value Date     03/18/2025    K 5.2 (H) 03/18/2025     03/18/2025    CO2 27 03/18/2025     03/18/2025    BUN 33 (H) 03/18/2025    CREATININE 1.3 03/18/2025    CALCIUM 9.2 03/18/2025    PROT 7.5 03/18/2025    ALBUMIN 3.5 03/18/2025    BILITOT 0.4 03/18/2025    ALKPHOS 78 03/18/2025    AST 17 03/18/2025    ALT 27 03/18/2025    ANIONGAP 5 (L) 03/18/2025    ESTGFRAFRICA >60 12/21/2021    EGFRNONAA 53 (A) 12/21/2021    TSH 3.078 04/23/2025     No results found for: "[id number]"  Assessment and Plan     1. Hyperlipidemia due to type 2 diabetes mellitus        2. Poorly controlled diabetes mellitus  Ambulatory referral/consult to Endocrinology    Ambulatory referral/consult to Diabetes Education    Comprehensive Metabolic Panel      3. Hypertension, unspecified type  Ambulatory referral/consult to Endocrinology      4. Abdominal pain, unspecified abdominal location  Ambulatory referral/consult to Endocrinology      5. Osteopenia of necks of both femurs            Poorly controlled diabetes mellitus   - Labs : check CMP   - A1c goal : <7.5%   - Last A1C 11.9%   - Reviewed logs/CGM: no " "logs/report to review. However, patient stays "high" 99% per Dexcom on phone.   - Referral to Diabetes Education to connect patient to clinic account and to review lifestyle management   - Reviewed diabetic diet   - Despite hyperglycemia, discussed risk of hypoglycemia on insulin and SFU   - Recommend starting GLP1 agonist such as Mounjaro to assist with diabetes management and weight loss. Will check CMP to assess for kidney function.    - If kidney function stable or improves, plan to start Mounjaro 2.5 mg weekly   - If kidney function declines, will increase insulin regimen    Medication:    - Discontinue Glipizide 10 mg daily   - Increase Lantus to 22 units nightly   - Continue Novolog 6 units with meals + MDC (150/25)     - Avoid SGLT2 inhibitors ( infections) and Metformin (GI upset)     - Reviewed goals of therapy are to get the best control we can without hypoglycemia.   - Reviewed patient's current insulin regimen. Clarified proper insulin dose and timing in relation to meals, etc. Insulin injection sites and proper rotation instructed.     - Advised frequent self blood glucose monitoring.  Patient encouraged to document glucose results and bring them to every clinic visit.   - Hypoglycemia precautions discussed. Instructed on precautions before driving.     - Call for Bg repeatedly < 70 or > 180.    - Close adherence to lifestyle changes recommended.    - Periodic follow ups for eye evaluations, foot care and dental care suggested.      Hypertension   - On ACEI   - Controlled.   - Blood pressure goals discussed with patient.      Hyperlipidemia due to type 2 diabetes mellitus   - LDL goal <70   - Not controlled   - Resume Atorvastatin 10 mg daily per ADA recommendations      Osteopenia of necks of both femurs   - Calcium and Vitamin D RDD provided.   - Weight-bearing exercise as tolerated   - Fall precautions made in the home.   - Repeat DEXA scan 05/2027      Follow up in about 2 months (around " 7/14/2025).    Visit today included increased complexity associated with the care of the problems addressed and managing the longitudinal care of the patient due to the serious and/or complex managed problems.    Luda Brown PA-C

## 2025-04-29 ENCOUNTER — HOSPITAL ENCOUNTER (OUTPATIENT)
Dept: RADIOLOGY | Facility: OTHER | Age: 70
Discharge: HOME | End: 2025-04-29
Attending: INTERNAL MEDICINE
Payer: MEDICARE

## 2025-04-29 DIAGNOSIS — E01.0 THYROMEGALY: ICD-10-CM

## 2025-04-29 PROCEDURE — 76536 US EXAM OF HEAD AND NECK: CPT | Mod: 26,,, | Performed by: RADIOLOGY

## 2025-04-29 PROCEDURE — 76536 US EXAM OF HEAD AND NECK: CPT | Mod: TC

## 2025-05-01 ENCOUNTER — HOSPITAL ENCOUNTER (OUTPATIENT)
Dept: RADIOLOGY | Facility: OTHER | Age: 70
Discharge: HOME | End: 2025-05-01
Attending: INTERNAL MEDICINE
Payer: MEDICARE

## 2025-05-01 DIAGNOSIS — Z87.81 HISTORY OF ANKLE FRACTURE: ICD-10-CM

## 2025-05-01 DIAGNOSIS — M85.872 OTHER SPECIFIED DISORDERS OF BONE DENSITY AND STRUCTURE, LEFT ANKLE AND FOOT: ICD-10-CM

## 2025-05-01 DIAGNOSIS — Z13.820 SCREENING FOR OSTEOPOROSIS: ICD-10-CM

## 2025-05-01 DIAGNOSIS — M25.571 ACUTE RIGHT ANKLE PAIN: ICD-10-CM

## 2025-05-01 DIAGNOSIS — M25.572 ACUTE LEFT ANKLE PAIN: ICD-10-CM

## 2025-05-01 DIAGNOSIS — M25.572 ACUTE LEFT ANKLE PAIN: Primary | ICD-10-CM

## 2025-05-01 PROCEDURE — 73610 X-RAY EXAM OF ANKLE: CPT | Mod: 26,LT,, | Performed by: INTERNAL MEDICINE

## 2025-05-01 PROCEDURE — 73610 X-RAY EXAM OF ANKLE: CPT | Mod: TC,FY,LT

## 2025-05-01 PROCEDURE — 77080 DXA BONE DENSITY AXIAL: CPT | Mod: 26,,, | Performed by: INTERNAL MEDICINE

## 2025-05-01 PROCEDURE — 77080 DXA BONE DENSITY AXIAL: CPT | Mod: TC

## 2025-05-01 NOTE — PROGRESS NOTES
Patient has significant right ankle pain without clear etiology.  X-ray order today for etiology evaluation

## 2025-05-02 ENCOUNTER — RESULTS FOLLOW-UP (OUTPATIENT)
Dept: INTERNAL MEDICINE | Facility: CLINIC | Age: 70
End: 2025-05-02

## 2025-05-14 ENCOUNTER — OFFICE VISIT (OUTPATIENT)
Dept: ENDOCRINOLOGY | Facility: CLINIC | Age: 70
End: 2025-05-14
Payer: MEDICARE

## 2025-05-14 VITALS
SYSTOLIC BLOOD PRESSURE: 122 MMHG | DIASTOLIC BLOOD PRESSURE: 86 MMHG | HEIGHT: 62 IN | WEIGHT: 208 LBS | BODY MASS INDEX: 38.28 KG/M2

## 2025-05-14 DIAGNOSIS — R10.9 ABDOMINAL PAIN, UNSPECIFIED ABDOMINAL LOCATION: ICD-10-CM

## 2025-05-14 DIAGNOSIS — M85.852 OSTEOPENIA OF NECKS OF BOTH FEMURS: ICD-10-CM

## 2025-05-14 DIAGNOSIS — M85.851 OSTEOPENIA OF NECKS OF BOTH FEMURS: ICD-10-CM

## 2025-05-14 DIAGNOSIS — I10 HYPERTENSION, UNSPECIFIED TYPE: ICD-10-CM

## 2025-05-14 DIAGNOSIS — E11.69 HYPERLIPIDEMIA DUE TO TYPE 2 DIABETES MELLITUS: Primary | ICD-10-CM

## 2025-05-14 DIAGNOSIS — E78.5 HYPERLIPIDEMIA DUE TO TYPE 2 DIABETES MELLITUS: Primary | ICD-10-CM

## 2025-05-14 DIAGNOSIS — E11.65 POORLY CONTROLLED DIABETES MELLITUS: ICD-10-CM

## 2025-05-14 PROCEDURE — 99999 PR PBB SHADOW E&M-EST. PATIENT-LVL V: CPT | Mod: PBBFAC,,,

## 2025-05-14 NOTE — ASSESSMENT & PLAN NOTE
" - Labs : check CMP   - A1c goal : <7.5%   - Last A1C 11.9%   - Reviewed logs/CGM: no logs/report to review. However, patient stays "high" 99% per Dexcom on phone.   - Referral to Diabetes Education to connect patient to clinic account and to review lifestyle management   - Reviewed diabetic diet   - Despite hyperglycemia, discussed risk of hypoglycemia on insulin and SFU   - Recommend starting GLP1 agonist such as Mounjaro to assist with diabetes management and weight loss. Will check CMP to assess for kidney function.    - If kidney function stable or improves, plan to start Mounjaro 2.5 mg weekly   - If kidney function declines, will increase insulin regimen    Medication:    - Discontinue Glipizide 10 mg daily   - Increase Lantus to 22 units nightly   - Continue Novolog 6 units with meals + MDC (150/25)     - Avoid SGLT2 inhibitors ( infections) and Metformin (GI upset)     - Reviewed goals of therapy are to get the best control we can without hypoglycemia.   - Reviewed patient's current insulin regimen. Clarified proper insulin dose and timing in relation to meals, etc. Insulin injection sites and proper rotation instructed.     - Advised frequent self blood glucose monitoring.  Patient encouraged to document glucose results and bring them to every clinic visit.   - Hypoglycemia precautions discussed. Instructed on precautions before driving.     - Call for Bg repeatedly < 70 or > 180.    - Close adherence to lifestyle changes recommended.    - Periodic follow ups for eye evaluations, foot care and dental care suggested.    "

## 2025-05-14 NOTE — PATIENT INSTRUCTIONS
Diabetes  See Diabetes Education- please bring your Dexcom sensor and  if you have it  Please have your Dexcom username and password    Medications:  Stop Glipizide   Increase Lantus to 22 units nightly  Continue Novolog 6 units 10-15 minutes before meals + sliding scale only if blood sugar is above >150  Add correction scale if needed.  Blood sugar 150 to 200 add 2 units  Blood sugar 201 to 250 add 4 units  Blood sugar 251 to 300 add 6 units  Blood sugar 301 to 350 add 8 units  Blood sugar greater than 350 add 10 units    Recommended daily calcium through diet: 1200 mg    Estimated Calcium Content of Foods:  Produce  Serving Size Estimated Calcium*    [place]lyce greens, frozen 8 oz 360 mg   Broccoli juan alberto 8 oz 200 mg   Kale, frozen 8 oz 180 mg   Soy Beans, green, boiled 8 oz 175 mg   [place], cooked, boiled 8 oz 160 mg   Figs, dried 2 figs 65 mg   Broccoli, fresh, cooked 8 oz 60 mg   Oranges 1 whole 55 mg   Seafood Serving Size Estimated Calcium*    Sardines, canned with bones 3 oz 325 mg   [place], canned with bones 3 oz 180 mg   Shrimp, canned 3 oz 125 mg   Dairy Serving Size Estimated Calcium*    Ricotta, part-skim 4 oz 335 mg   Yogurt, plain, low-fat 6 oz 310 mg   Milk, skim, low-fat, whole 8 oz 300 mg   Yogurt with fruit, low-fat 6 oz 260 mg   Mozzarella, part-skim 1 oz 210 mg   Cheddar 1 oz 205 mg   Yogurt, Greek 6 oz 200 mg   American Cheese 1 oz 195 mg   Feta Cheese 4 oz 140 mg   Cottage Cheese, 2% 4 oz 105 mg   Frozen yogurt, vanilla 8 oz 105 mg   Ice Cream, vanilla 8 oz 85 mg   Parmesan 1 tbsp 55 mg   Fortified Food Serving Size Estimated Calcium*   [place] milk, rice milk or soy milk, fortified 8 oz 300 mg   Orange juice and other fruit juices, fortified 8 oz 300 mg   Tofu, prepared with calcium 4 oz 205 mg   Waffle, frozen, fortified 2 pieces 200 mg   Oatmeal, fortified 1 packet 140 mg   English muffin, fortified 1 muffin 100 mg   Cereal, fortified 8 oz 100-1,000 mg   Other Serving Size Estimated  Calcium*   Mac & cheese, frozen 1 package 325 mg   Pizza, cheese, frozen 1 serving 115 mg   Pudding, chocolate, prepared with 2% milk 4 oz 160 mg   Beans, baked, canned 4 oz 160 mg   *The calcium content listed for most foods is estimated and can vary due to multiple factors. Check the food label to determine how much calcium is in a particular product.  If you read the nutrition label for a food source, it lists the % calcium in that food.  For an 8 oz glass of milk, for example, the label states calcium 30%.  This is equivalent to 300 mg of calcium (multiply the listed number by 10).   **Table from the National Osteopor[employer] Foundation

## 2025-05-14 NOTE — ASSESSMENT & PLAN NOTE
- Calcium and Vitamin D RDD provided.   - Weight-bearing exercise as tolerated   - Fall precautions made in the home.   - Repeat DEXA scan 05/2027

## 2025-05-16 ENCOUNTER — TELEPHONE (OUTPATIENT)
Dept: INTERNAL MEDICINE | Facility: CLINIC | Age: 70
End: 2025-05-16
Payer: MEDICARE

## 2025-05-16 NOTE — TELEPHONE ENCOUNTER
Patient would like Omni Pod 5 instead of Dexcom G6 as she believes the Dexcom G6 isn't accurate. Please advise.

## 2025-05-16 NOTE — TELEPHONE ENCOUNTER
----- Message from Hermes sent at 5/16/2025  9:38 AM CDT -----  Contact: pt @ [phone]  Requesting an RX refill or new RX. Is this a refill or new RX: refill RX name and strength (copy/paste from chart):  omni pod 5 Is this a 30 day or 90 day RX: Pharmacy name and phone # (copy/paste from chart):  [facility] [address]Vhbgx: [phone] Fax: [phone] The doctors have asked that we provide their patients with the following 2 reminders -- prescription refills can take up to 72 hours, and a friendly reminder that in the future you can use your [facility] account to request refills: yes

## 2025-05-16 NOTE — TELEPHONE ENCOUNTER
Message left for patient to return my call to clarify which Omni Pod 5 she'd like as it was not in here chart. When I did an external search to attach the medication to this message, different Omni Pod 5's populated.

## 2025-05-19 ENCOUNTER — CLINICAL SUPPORT (OUTPATIENT)
Dept: DIABETES | Facility: CLINIC | Age: 70
End: 2025-05-19
Payer: MEDICARE

## 2025-05-19 VITALS — BODY MASS INDEX: 37.86 KG/M2 | WEIGHT: 207 LBS

## 2025-05-19 DIAGNOSIS — E11.65 POORLY CONTROLLED DIABETES MELLITUS: ICD-10-CM

## 2025-05-19 PROCEDURE — G0108 DIAB MANAGE TRN  PER INDIV: HCPCS | Mod: S$GLB,,, | Performed by: FAMILY MEDICINE

## 2025-05-19 PROCEDURE — 99999 PR PBB SHADOW E&M-EST. PATIENT-LVL I: CPT | Mod: PBBFAC,,,

## 2025-05-19 RX ORDER — INSULIN PMP CART,AUT,G6/7,CNTR
EACH SUBCUTANEOUS
Status: CANCELLED | OUTPATIENT
Start: 2025-05-19

## 2025-05-21 NOTE — PROGRESS NOTES
Diabetes Care Specialist Progress Note  Author: Mary Ellen Rubi RD  Date: 5/21/2025    Intake    Program Intake  Reason for Diabetes Program Visit:: Initial Diabetes Assessment  Current diabetes risk level:: high  In the last month, have you used the ER or been admitted to the hospital: No    Current Diabetes Treatment: Insulin  Method of insulin delivery?: Injections  Injection Type: Pens    Continuous Glucose Monitoring  Patient has CGM: Yes  Personal CGM type:: Dexcom G7    Lab Results   Component Value Date    HGBA1C 11.9 (H) 03/18/2025       Weight: 93.9 kg (207 lb 0.2 oz)       Body mass index is 37.86 kg/m².    Lifestyle Coping Support & Clinical    Lifestyle/Coping/Support  Compared to other people your age, how would you rate your health?: Fair  Learning Barriers:: None  Area of need?: Deferred         Diabetes Self-Management Skills Assessment    Medication Skills Assessment  Patient is able to identify current diabetes medications, dosages, and appropriate timing of medications.: no (Takes 3 units or 6 units on Novolog, takes 22 units of Lantus before bed)  Patient reports problems or concerns with current medication regimen.: no  Patient is  aware that some diabetes medications can cause low blood sugar?: Yes  Medication Skills Assessment Completed:: Yes  Assessment indicates:: Knowledge deficit, Instruction Needed  Area of need?: Yes    Diabetes Disease Process/Treatment Options  Diabetes Type?: Type II  Area of need?: Deferred    Nutrition/Healthy Eating  Nutrition/Healthy Eating Skills Assessment Completed:: No  Deffered due to:: Time  Area of need?: Deferred    Physical Activity/Exercise  Physical Activity/Exercise Skills Assessment Completed: : No  Deffered due to:: Time  Area of need?: Deferred    Home Blood Glucose Monitoring  Personal CGM type:: Dexcom G7  Home Blood Glucose Monitoring Skills Assessment Completed: : Yes  Assessment indicates:: Knowledge deficit  Area of need?: Yes    Acute  Complications  Have you ever had hyperglycemia (high  or more)?: yes  Acute Complications Skills Assessment Completed: : Yes  Assessment indicates:: Knowledge deficit, Instruction Needed  Area of need?: Yes    Chronic Complications  Chronic Complications Skills Assessment Completed: : No  Deferred due to:: Time  Area of need?: Deferred      Assessment Summary and Plan    Based on today's diabetes care assessment, the following areas of need were identified:      Identified Areas of Need      Medication/Current Diabetes Treatment: Yes-see care plan   Lifestyle Coping/Support: Deferred   Diabetes Disease Process/Treatment Options: Deferred   Nutrition/Healthy Eating: Deferred    Physical Activity/Exercise: Deferred    Home Blood Glucose Monitoring: Yes -Has a dexcom, report reviewed. BG are globally elevated, Most above 350. She is not wearing her sensor at this time, states gets error readings often so she removed it. May be due to hyperglycemia.    Acute Complications: Yes -Reviewed s/s of hyperglycemia. She is symptomatic. Stressed need to take insulin as ordered to improve BG.    Chronic Complications: Deferred       Today's interventions were provided through individual discussion, instruction, and written materials were provided.      Patient verbalized understanding of instruction and written materials.  Pt was able to return back demonstration of instructions today. Patient understood key points, needs reinforcement and further instruction.     Diabetes Self-Management Care Plan:    Today's Diabetes Self-Management Care Plan was developed with Annie's input. Annie has agreed to work toward the following goal(s) to improve his/her overall diabetes control.      There are no recently modified care plans to display for this patient.      Follow Up Plan     No follow-ups on file.    Today's care plan and follow up schedule was discussed with patient.  Annie verbalized understanding of the care plan,  goals, and agrees to follow up plan.        The patient was encouraged to communicate with his/her health care provider/physician and care team regarding his/her condition(s) and treatment.  I provided the patient with my contact information today and encouraged to contact me via phone or [facility] Patient Portal as needed.     Length of Visit   Total Time: 60 Minutes

## 2025-05-23 ENCOUNTER — OFFICE VISIT (OUTPATIENT)
Dept: INTERNAL MEDICINE | Facility: CLINIC | Age: 70
End: 2025-05-23
Payer: MEDICARE

## 2025-05-23 VITALS
SYSTOLIC BLOOD PRESSURE: 120 MMHG | HEART RATE: 89 BPM | OXYGEN SATURATION: 98 % | WEIGHT: 205.5 LBS | BODY MASS INDEX: 37.81 KG/M2 | HEIGHT: 62 IN | DIASTOLIC BLOOD PRESSURE: 80 MMHG

## 2025-05-23 DIAGNOSIS — E11.65 UNCONTROLLED TYPE 2 DIABETES MELLITUS WITH HYPERGLYCEMIA, WITH LONG-TERM CURRENT USE OF INSULIN: ICD-10-CM

## 2025-05-23 DIAGNOSIS — M85.851 OSTEOPENIA OF NECKS OF BOTH FEMURS: ICD-10-CM

## 2025-05-23 DIAGNOSIS — E11.42 DIABETIC POLYNEUROPATHY ASSOCIATED WITH TYPE 2 DIABETES MELLITUS: ICD-10-CM

## 2025-05-23 DIAGNOSIS — E11.69 HYPERLIPIDEMIA DUE TO TYPE 2 DIABETES MELLITUS: ICD-10-CM

## 2025-05-23 DIAGNOSIS — M19.072 PRIMARY OSTEOARTHRITIS OF LEFT FOOT: ICD-10-CM

## 2025-05-23 DIAGNOSIS — R10.9 ABDOMINAL PAIN, UNSPECIFIED ABDOMINAL LOCATION: ICD-10-CM

## 2025-05-23 DIAGNOSIS — Z79.4 DIABETES MELLITUS DUE TO UNDERLYING CONDITION WITH STAGE 3B CHRONIC KIDNEY DISEASE, WITH LONG-TERM CURRENT USE OF INSULIN: ICD-10-CM

## 2025-05-23 DIAGNOSIS — N18.32 DIABETES MELLITUS DUE TO UNDERLYING CONDITION WITH STAGE 3B CHRONIC KIDNEY DISEASE, WITH LONG-TERM CURRENT USE OF INSULIN: ICD-10-CM

## 2025-05-23 DIAGNOSIS — Z15.2 CLASS 2 OBESITY DUE TO DISRUPTION OF MC4R PATHWAY WITH SERIOUS COMORBIDITY AND BODY MASS INDEX (BMI) OF 37.0 TO 37.9 IN ADULT: ICD-10-CM

## 2025-05-23 DIAGNOSIS — E04.1 THYROID NODULE GREATER THAN OR EQUAL TO 1 CM IN DIAMETER INCIDENTALLY NOTED ON IMAGING STUDY: Primary | ICD-10-CM

## 2025-05-23 DIAGNOSIS — Z79.4 UNCONTROLLED TYPE 2 DIABETES MELLITUS WITH HYPERGLYCEMIA, WITH LONG-TERM CURRENT USE OF INSULIN: ICD-10-CM

## 2025-05-23 DIAGNOSIS — I10 PRIMARY HYPERTENSION: ICD-10-CM

## 2025-05-23 DIAGNOSIS — E88.82 CLASS 2 OBESITY DUE TO DISRUPTION OF MC4R PATHWAY WITH SERIOUS COMORBIDITY AND BODY MASS INDEX (BMI) OF 37.0 TO 37.9 IN ADULT: ICD-10-CM

## 2025-05-23 DIAGNOSIS — E66.812 CLASS 2 OBESITY DUE TO DISRUPTION OF MC4R PATHWAY WITH SERIOUS COMORBIDITY AND BODY MASS INDEX (BMI) OF 37.0 TO 37.9 IN ADULT: ICD-10-CM

## 2025-05-23 DIAGNOSIS — R11.0 NAUSEA: ICD-10-CM

## 2025-05-23 DIAGNOSIS — E08.22 DIABETES MELLITUS DUE TO UNDERLYING CONDITION WITH STAGE 3B CHRONIC KIDNEY DISEASE, WITH LONG-TERM CURRENT USE OF INSULIN: ICD-10-CM

## 2025-05-23 DIAGNOSIS — E78.5 HYPERLIPIDEMIA DUE TO TYPE 2 DIABETES MELLITUS: ICD-10-CM

## 2025-05-23 DIAGNOSIS — M85.852 OSTEOPENIA OF NECKS OF BOTH FEMURS: ICD-10-CM

## 2025-05-23 PROCEDURE — G2211 COMPLEX E/M VISIT ADD ON: HCPCS | Mod: S$GLB,,, | Performed by: INTERNAL MEDICINE

## 2025-05-23 PROCEDURE — 3008F BODY MASS INDEX DOCD: CPT | Mod: CPTII,S$GLB,, | Performed by: INTERNAL MEDICINE

## 2025-05-23 PROCEDURE — 99215 OFFICE O/P EST HI 40 MIN: CPT | Mod: S$GLB,,, | Performed by: INTERNAL MEDICINE

## 2025-05-23 PROCEDURE — 3066F NEPHROPATHY DOC TX: CPT | Mod: CPTII,S$GLB,, | Performed by: INTERNAL MEDICINE

## 2025-05-23 PROCEDURE — 3060F POS MICROALBUMINURIA REV: CPT | Mod: CPTII,S$GLB,, | Performed by: INTERNAL MEDICINE

## 2025-05-23 PROCEDURE — 3046F HEMOGLOBIN A1C LEVEL >9.0%: CPT | Mod: CPTII,S$GLB,, | Performed by: INTERNAL MEDICINE

## 2025-05-23 PROCEDURE — 1159F MED LIST DOCD IN RCRD: CPT | Mod: CPTII,S$GLB,, | Performed by: INTERNAL MEDICINE

## 2025-05-23 PROCEDURE — 3079F DIAST BP 80-89 MM HG: CPT | Mod: CPTII,S$GLB,, | Performed by: INTERNAL MEDICINE

## 2025-05-23 PROCEDURE — 1160F RVW MEDS BY RX/DR IN RCRD: CPT | Mod: CPTII,S$GLB,, | Performed by: INTERNAL MEDICINE

## 2025-05-23 PROCEDURE — 1101F PT FALLS ASSESS-DOCD LE1/YR: CPT | Mod: CPTII,S$GLB,, | Performed by: INTERNAL MEDICINE

## 2025-05-23 PROCEDURE — 4010F ACE/ARB THERAPY RXD/TAKEN: CPT | Mod: CPTII,S$GLB,, | Performed by: INTERNAL MEDICINE

## 2025-05-23 PROCEDURE — 99999 PR PBB SHADOW E&M-EST. PATIENT-LVL III: CPT | Mod: PBBFAC,,, | Performed by: INTERNAL MEDICINE

## 2025-05-23 PROCEDURE — 3288F FALL RISK ASSESSMENT DOCD: CPT | Mod: CPTII,S$GLB,, | Performed by: INTERNAL MEDICINE

## 2025-05-23 PROCEDURE — 3074F SYST BP LT 130 MM HG: CPT | Mod: CPTII,S$GLB,, | Performed by: INTERNAL MEDICINE

## 2025-05-23 PROCEDURE — 1126F AMNT PAIN NOTED NONE PRSNT: CPT | Mod: CPTII,S$GLB,, | Performed by: INTERNAL MEDICINE

## 2025-05-23 RX ORDER — INSULIN ASPART 100 [IU]/ML
3 INJECTION, SOLUTION INTRAVENOUS; SUBCUTANEOUS
Qty: 15 ML | Refills: 6 | Status: SHIPPED | OUTPATIENT
Start: 2025-05-23 | End: 2026-05-23

## 2025-05-23 RX ORDER — ONDANSETRON 4 MG/1
4 TABLET, ORALLY DISINTEGRATING ORAL EVERY 8 HOURS PRN
Qty: 20 TABLET | Refills: 2 | Status: SHIPPED | OUTPATIENT
Start: 2025-05-23

## 2025-05-23 RX ORDER — DICYCLOMINE HYDROCHLORIDE 10 MG/1
10 CAPSULE ORAL DAILY
Qty: 30 CAPSULE | Refills: 3 | Status: SHIPPED | OUTPATIENT
Start: 2025-05-23

## 2025-05-23 RX ORDER — INSULIN GLARGINE 100 [IU]/ML
25 INJECTION, SOLUTION SUBCUTANEOUS NIGHTLY
Qty: 15 ML | Refills: 6 | Status: SHIPPED | OUTPATIENT
Start: 2025-05-23 | End: 2026-05-23

## 2025-05-23 NOTE — PROGRESS NOTES
Subjective:      Patient ID: Annie Matthews is a 69 y.o. female.    Chief Complaint: Diabetes (Follow up )      History of Present Illness    CHIEF COMPLAINT:  Follow-up for diabetes management and review of recent laboratory and imaging results.    HISTORY OF PRESENT ILLNESS:  This is a 69-year-old female presenting for follow-up of uncontrolled type 2 diabetes mellitus with associated chronic kidney disease stage IIIB and to review results from her annual wellness examination, including laboratory studies, bone density scan, thyroid ultrasound, and right foot X-ray.  Her HbA1c has improved from 14% to 11%, reflecting progress in diabetic control. She has been actively engaged in care with a nutritionist and a diabetic clinic, and is currently using a NovoLog sliding scale in addition to Lantus (insulin glargine) for glucose management. The patient reports stable renal function with a GFR of 44, normal liver enzymes, electrolytes, and CBC showing resolution of prior anemia with normal WBC and platelet counts.  Her lipid profile demonstrates improvement, though her LDL remains above goal (<100 mg/dL) on atorvastatin 10 mg daily.  Thyroid function tests (TSH and Free T4) are within normal limits; however, thyroid ultrasound reveals multiple small nodules measuring 1.1 to 1.2 cm, all below the threshold (1.5 cm) typically requiring FNA biopsy. The patient reports mild throat itching, but no compressive symptoms.  Bone density scan is consistent with osteopenia.  All lab and imaging results have been independently reviewed and interpreted by me, and discussed in detail with the patient during today's visit.  She recently experienced severe GI symptoms (vomiting and diarrhea) on Thursday following an insulin dose adjustment. She is currently using Lantus 25 units daily, but the nutritionist has recommended reducing the dose to 22 units. She also reports connectivity issues with her continuous glucose monitor  (CGM), with frequent lost signal messages.  A recent diabetic eye examination suggested possible diabetic retinopathy, but a follow-up with ophthalmology found no evidence of retinopathy, contradicting the initial assessment. The patient also denies any symptoms of diabetic neuropathy.  Additionally, she complains of ongoing right ankle pain and stiffness, related to a previous ankle fracture (in two locations) treated surgically with metal plate placement several years ago. She continues to experience discomfort, which may benefit from further orthopedic evaluation.    ASSESSMENT:  Type 2 Diabetes Mellitus, Uncontrolled - Improved A1c, currently 11%. Continued insulin therapy and monitoring required.  Chronic Kidney Disease, Stage IIIB - Stable with GFR of 44.  Hyperlipidemia - Improved, though LDL remains above goal on atorvastatin 10 mg.  Thyroid Nodules - Multiple small nodules <1.5 cm, euthyroid; ongoing monitoring recommended.  Osteopenia - Confirmed on bone density scan; consider calcium/vitamin D supplementation and lifestyle modifications.  History of Ankle Fracture with Hardware - Persistent symptoms; may benefit from orthopedic follow-up.  Gastrointestinal Symptoms - Suspected insulin-related or dietary; monitor for recurrence.  Technical Issues with CGM - Troubleshooting and device reassessment recommended.    PLAN:  Adjust insulin dosing per nutritionists recommendations.  Continue current statin; consider uptitration if LDL remains above target.  Monitor thyroid nodules with follow-up ultrasound in 12 months.  Encourage weight-bearing exercise and vitamin D/calcium supplementation for osteopenia.  Refer to orthopedics if ankle pain persists.  Follow up in diabetic clinic and ophthalmology as scheduled.  Address CGM connectivity issues--coordinate with device support or diabetes educator.      ROS:  General: -fever, -chills, -fatigue, -weight gain, -weight loss  Eyes: -vision changes, -redness,  "-discharge  ENT: -ear pain, -nasal congestion, -sore throat  Cardiovascular: -chest pain, -palpitations, -lower extremity edema  Respiratory: -cough, -shortness of breath  Gastrointestinal: +abdominal pain, +nausea, +vomiting, +diarrhea, -constipation, -blood in stool  Genitourinary: -dysuria, -hematuria, -frequency  Musculoskeletal: +joint pain, -muscle pain, +joint stiffness, +limb pain  Skin: -rash, -lesion  Neurological: -headache, -dizziness, -numbness, -tingling  Psychiatric: -anxiety, -depression, -sleep difficulty  Endocrine: +neck lumps         Active Problem List with Overview Notes    Diagnosis Date Noted    Hyperlipidemia due to type 2 diabetes mellitus 05/14/2025    Osteopenia of necks of both femurs 05/14/2025    Abdominal pain 02/09/2025    Pyelonephritis 02/07/2025    Lumbar pain 12/14/2023    Gastroesophageal reflux disease without esophagitis 06/02/2020    Poorly controlled diabetes mellitus 08/22/2018    Bronchitis 05/05/2018    Wheezing 05/05/2018    Sinusitis 05/05/2018    Bilateral non-suppurative otitis media 05/05/2018    Hypertension 07/22/2015    Valvular heart disease 05/21/2010        MEDICATIONS:  Current Medications[1]    ALLERGIES:  Review of patient's allergies indicates:   Allergen Reactions    Strawberry Hives and Itching    Sulfa (sulfonamide antibiotics)     Tomato Hives    Codeine Nausea And Vomiting        Past Medical History:   Diagnosis Date    Atrial fibrillation     Diabetes mellitus     Diabetes mellitus, type 2     Hypertension      Past Surgical History:   Procedure Laterality Date    HYSTERECTOMY       Social History     Social History Narrative    Not on file     Family History   Problem Relation Name Age of Onset    Heart failure Mother      Heart attack Father      Cancer Sister         Vitals:    05/23/25 1340   BP: 120/80   Pulse: 89   SpO2: 98%   Weight: 93.2 kg (205 lb 7.5 oz)   Height: 5' 2" (1.575 m)   PainSc: 0-No pain       Review of " Systems     Lab Results   Component Value Date    HGBA1C 11.9 (H) 03/18/2025    HGBA1C 14.0 (H) 02/09/2025    HGBA1C 14.0 (H) 02/08/2025     Lab Results   Component Value Date    MICALBCREAT 38.1 (H) 04/23/2025     Lab Results   Component Value Date    LDLCALC 144.6 03/18/2025    LDLCALC 158 (H) 04/02/2024    CHOL 213 (H) 03/18/2025    HDL 48 03/18/2025    TRIG 102 03/18/2025       Lab Results   Component Value Date     03/18/2025    K 5.2 (H) 03/18/2025     03/18/2025    CO2 27 03/18/2025     03/18/2025    BUN 33 (H) 03/18/2025    CREATININE 1.3 03/18/2025    CALCIUM 9.2 03/18/2025    PROT 7.5 03/18/2025    ALBUMIN 3.5 03/18/2025    BILITOT 0.4 03/18/2025    ALKPHOS 78 03/18/2025    AST 17 03/18/2025    ALT 27 03/18/2025    ANIONGAP 5 (L) 03/18/2025    ESTGFRAFRICA >60 12/21/2021    EGFRNONAA 53 (A) 12/21/2021    WBC 9.65 03/18/2025    HGB 12.4 03/18/2025    HGB 10.9 (L) 02/09/2025    HCT 40.8 03/18/2025    MCV 90 03/18/2025     03/18/2025    TSH 3.078 04/23/2025    HEPCAB Negative 02/01/2025       Lab Results   Component Value Date    FERRITIN 415 (H) 03/18/2025    IRON 100 03/18/2025    TRANSFERRIN 199 (L) 03/18/2025    TIBC 295 03/18/2025    FESATURATED 34 03/18/2025       Objective:   Physical Exam   Physical Exam    General: No acute distress. Well-developed. Well-nourished.+ obesity  Eyes: EOMI. Sclerae anicteric.  HENT: Normocephalic. Atraumatic. Nares patent. Moist oral mucosa.  Ears: Bilateral TMs clear. Bilateral EACs clear.  Cardiovascular: Regular rate. Regular rhythm. No murmurs. No rubs. No gallops. Normal S1, S2.  Respiratory: Normal respiratory effort. Clear to auscultation bilaterally. No rales. No rhonchi. No wheezing.  Abdomen: Soft. Non-tender. Non-distended. Normoactive bowel sounds.  Musculoskeletal: No  obvious deformity.  Extremities: No lower extremity edema.  Neurological: Alert & oriented x3. No slurred speech. Normal gait.  Psychiatric: Normal mood. Normal  affect. Good insight. Good judgment.  Skin: Warm. Dry. No rash.  Neck: Thyroid nodules present. Thyroid enlarged.         Assessment:     1. Thyroid nodule greater than or equal to 1 cm in diameter incidentally noted on imaging study    2. Osteopenia of necks of both femurs    3. Abdominal pain, unspecified abdominal location    4. Nausea    5. Uncontrolled type 2 diabetes mellitus with hyperglycemia, with long-term current use of insulin    6. Diabetes mellitus due to underlying condition with stage 3b chronic kidney disease, with long-term current use of insulin    7. Hyperlipidemia due to type 2 diabetes mellitus    8. Primary hypertension    9. 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 Primary osteoarthritis of left foot    11. Diabetic polyneuropathy associated with type 2 diabetes mellitus      Plan:   Assessment & Plan   Thyroid nodule greater than or equal to 1 cm in diameter incidentally noted on imaging study  - multiple nodules with size 1.1 to 1.2 cm in size  - has normal thyroid function study in TSH and free T4  - we will repeat ultrasound study for thyroid nodules progression in 1 year and if over 1.5 cm in size we will refer her to fine-needle biopsy evaluation  Osteopenia of necks of both femurs  - has not evaluation from DEXA study results  - advised the patient to start taking vitamin D3 1000 units daily and calcium 1000 mg daily  Abdominal pain, unspecified abdominal location  - prescribed Bentyl for abdominal pain may need  Nausea  - prescribed Zofran for nausea symptomatic treatment  Uncontrolled type 2 diabetes mellitus with hyperglycemia, with long-term current use of insulin  - continue following diabetic clinic and use desiccans for glucose monitoring  - continue taking glipizide 10 mg daily  - continues on Lantus 25 units daily  - continue using insulin NovoLog sliding scale treatment  - we will  repeat A1c and BMP in next visit  Diabetes mellitus due to underlying condition with stage 3b chronic kidney disease, with long-term current use of insulin  - continue treat diabetes as mentioned above  - avoid NSAIDs due to renal dysfunction  - monitor BMP in next visit  - encourage patient is to drink more liquid to avoid dehydration  Hyperlipidemia due to type 2 diabetes mellitus  - increased dose Lipitor from 10-20 mg daily after dinner for better control LDL level  - goal to keep LDL level less than 100  Primary hypertension  - well-controlled continue current lisinopril 40 mg daily, and amlodipine 10 mg daily  - advised the patient taking low-salt diet  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 and body mass index (BMI) of 37.0 to 37.9 in adult  - educated counseled patient's for healthy lifestyle modification taking low-calorie diet and to do exercise tolerated for overweight reduction  Primary osteoarthritis of left foot  - continue Voltaren gel  - prescribed and start taking tramadol PRN for pain control  Diabetic polyneuropathy associated with type 2 diabetes mellitus  - tight-controlled diabetes   - continue Neurontin 100 mg daily  Assessment & Plan    E11.9 Type 2 diabetes mellitus without complications  E78.5 Hyperlipidemia, unspecified  E04.2 Nontoxic multinodular goiter  M19.272 Secondary osteoarthritis, left ankle and foot  Z79.4 Long term (current) use of insulin  Z96.698 Presence of other orthopedic joint implants    IMPRESSION:  A1C improved from 14 to 11, indicating better glycemic control.  Renal function stable.  Thyroid function normal.  Multiple small thyroid nodules (1.1-1.2 cm), deemed benign with low malignancy risk.  Osteoarthritis potential cause of ankle pain, possibly related to hardware from previous fracture.  Bone density fragile but not osteoporotic.  Cholesterol levels improved but still suboptimal, requiring medication adjustment.    TYPE 2  "DIABETES MELLITUS:  - Monitored the patient's A1C, which has improved from 14 to 11, indicating better control of blood sugar.  - Patient reports high blood sugar and issues with signal loss from monitoring device.  - Discussed trying Omnipod for better diabetes management.  - Adjusted Lantus dosage to 25 units and continued NovoLog insulin as per sliding scale provided by endocrinologist.  - Prescribed refills of both Lantus and NovoLog.  - Advised the patient to avoid sweet foods and reduce bread intake to help manage glucose levels.  - Ordered CMP to be reviewed by endocrinologist.    HYPERLIPIDEMIA:  - Monitored cholesterol levels, which are better but not at target range.  - Patient is currently taking Atorvastatin (Lipitor).  - Doubled Atorvastatin dosage for better cholesterol control.    MULTINODULAR GOITER:  - Monitored thyroid with multiple small nodules, currently measuring 1.1 to 1.2 cm and benign with low risk for malignancy.  - These do not exceed the 1.5 cm threshold for biopsy.  - Plan to continue monitoring thyroid nodules and consider biopsy if growth occurs.  - Educated the patient on thyroid nodules, current size, and threshold for biopsy.    OSTEOARTHRITIS OF LEFT ANKLE:  - Monitored the patient's ankle pain and stiffness; hardware from previous ankle fracture is likely causing osteoarthritis symptoms.  - No fracture detected.  - Noted that soaking in Epsom salt provides no relief.  - Prescribed Tylenol (acetaminophen) for ankle pain to be taken as needed.  - Advised against ibuprofen due to kidney concerns.    GENERAL HEALTH MANAGEMENT:  - Discussed importance of calcium and vitamin D for bone health and fracture prevention.  - Prescribed calcium supplement (1 tablet daily) and vitamin D supplement (1 capsule daily).  - Started antiemetic medication to be taken as needed.  - Clarified meaning of "abnormal" lab results, explaining values can be above or below normal range.              Follow up " in about 2 months (around 7/23/2025).     There are  Patient Instructions on file for this visit.  [unfilled]      Calorie Counting Diet   About this topic   Calories are in the food that you eat and the liquids that you drink. Your body uses calories to give your body energy. Then, you can use the energy to do your daily activities. If you eat more calories than your body needs, your body will store this as body fat. Over time, this can lead to weight gain. To lose weight, burn calories by being active and eat less calories than what your body needs. The more active you are the more calories you burn.  General   Calorie counting is when you keep track of how many calories you eat each day. This helps you not eat too many or too few calories. You need to be aware of the calorie content of the food you eat. Read food labels carefully. They will show you how many calories are in a serving. Reading the labels will help you make healthy food choices.       What will the results be?   You can increase or lower your body weight to the desired level.  What lifestyle changes are needed?   You will need to learn what foods to eat more of and what foods to limit. Your doctor or dietitian can help make a meal plan that works for you.  What changes to diet are needed?   Lower your calorie intake if you want to lose weight.  Raise your calorie intake if you want to gain weight.  Who should use this diet?   This diet is right for those who need to control their weight.  What foods are good to eat?   Eat whole grain foods and foods high in fiber.  Choose many different fruits and vegetables. Fresh or frozen is best. Otherwise, choose fruits canned in juice or water, not in syrup. Choose vegetables canned without salt.  Eat more lean meats like chicken, fish, or turkey. Eat less red meat.  Choose low-fat dairy products.  What foods should be limited or avoided?   Stay away from foods high in sugars. These include foods such as  candies, ice cream, table sugars, and high-fructose corn syrup found in sodas and juice drinks.  Cut back on solid fats like butter or margarine.  Eat less fatty or processed foods like fried foods and chips. Use good fats found in fish, nuts, avocados, and oils, like olive oil and canola oil.  What problems could happen?   Too many calories can cause you to become overweight.  Too few calories can cause you to become underweight and have poor nutrition.  When do I need to call the doctor?   Your health problem is not better or you are feeling worse  You have questions about this diet  Helpful tips   Ask your doctor or dietitian for specific types of food groups and serving sizes. Ask your doctor or dietitian how many calories per day are right for you. Be sure to talk about exercise. You may need more calories on the days that you exercise.  Last Reviewed Date   2021-03-12  Consumer Information Use and Disclaimer   This generalized information is a limited summary of diagnosis, treatment, and/or medication information. It is not meant to be comprehensive and should be used as a tool to help the user understand and/or assess potential diagnostic and treatment options. It does NOT include all information about conditions, treatments, medications, side effects, or risks that may apply to a specific patient. It is not intended to be medical advice or a substitute for the medical advice, diagnosis, or treatment of a health care provider based on the health care provider's examination and assessment of a patients specific and unique circumstances. Patients must speak with a health care provider for complete information about their health, medical questions, and treatment options, including any risks or benefits regarding use of medications. This information does not endorse any treatments or medications as safe, effective, or approved for treating a specific patient. [employer] and its affiliates disclaim any  warranty or liability relating to this information or the use thereof. The use of this information is governed by the Terms of Use, available at https://www.wolterskluwer.com/en/know/clinical-effectiveness-terms   Copyright   Copyright © 2024 [employer] and its affiliates and/or licensors. All rights reserved.      Visit Checklist (as applicable):  1. Status of new and prior symptoms discussed? yes  2. Imaging reviewed/ ordered as appropriate? yes  3. Lab study reviewed/ ordered as appropriate? yes  4. Plan for work-up and treatment discussed with patient? yes  5. Potential medication side-effects and monitoring plan discussed? yes  6. Review of outside medical records was performed and pertinent details are summarized in the HPI above? yes     Time spent on this encounter: 50 minutes. This includes face to face time and non-face to face time preparing to see the patient (eg, review of tests), obtaining and/or reviewing separately obtained history, documenting clinical information in the electronic or other health record, independently interpreting results (not separately reported) and communicating results to the patient/family/caregiver, or care coordination (not separately reported). Also patient education regarding chronic and acute medical conditions reviewed. Patient handouts given if appropriate.     Each patient to whom he or she provides medical services by telemedicine is:  (1) informed of the relationship between the physician and patient and the respective role of any other health care provider with respect to management of the patient; and (2) notified that he or she may decline to receive medical services by telemedicine and may withdraw from such care at any time.     This note was generated with the assistance of ambient listening technology. Verbal consent was obtained by the patient and accompanying visitor(s) for the recording of patient appointment to facilitate this note. I attest to having  reviewed and edited the generated note for accuracy, though some syntax or spelling errors may persist. Please contact the author of this note for any clarification.       Toño Martinez MD  [facility]      Visit today is associated with current or anticipated ongoing medical care related to this patient's single serious condition/complex condition of  Thyroid nodule greater than or equal to 1 cm in diameter incidentally noted on imaging study    Osteopenia of necks of both femurs    Abdominal pain, unspecified abdominal location    Nausea    Uncontrolled type 2 diabetes mellitus with hyperglycemia, with long-term current use of insulin    Diabetes mellitus due to underlying condition with stage 3b chronic kidney disease, with long-term current use of insulin    Hyperlipidemia due to type 2 diabetes mellitus    Primary hypertension    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 The patient will return to see me as these issues will be followed longitudinally.                           [1]    Current Outpatient Medications:     amLODIPine (NORVASC) 10 MG tablet, Take 1 tablet (10 mg total) by mouth once daily., Disp: 90 tablet, Rfl: 3    aspirin (ECOTRIN) 81 MG EC tablet, Take 81 mg by mouth once daily., Disp: , Rfl:     atorvastatin (LIPITOR) 10 MG tablet, Take 10 mg by mouth once daily., Disp: , Rfl:     blood-glucose sensor (DEXCOM G6 SENSOR) Gracie, Patient to check blood sugar up to every 5 minutes, Disp: 10 each, Rfl: 3    diclofenac sodium (VOLTAREN) 1 % Gel, Apply 2 g topically 3 (three) times daily as needed (muscle spasm pain). Apply 2 grams to affected area 3 times daily as needed, Disp: 100 g, Rfl: 0    dicyclomine (BENTYL) 10 MG capsule, Take 10 mg by mouth once daily., Disp: , Rfl:      "glipiZIDE (GLUCOTROL) 10 MG tablet, Take 1 tablet (10 mg total) by mouth daily with breakfast., Disp: 30 tablet, Rfl: 1    insulin aspart U-100 (NOVOLOG) 100 unit/mL (3 mL) InPn pen, Inject 3 Units into the skin 3 (three) times daily with meals. Check glucose before administering. Hold if blood sugar is less than 150., Disp: 15 mL, Rfl: 0    insulin glargine U-100, Lantus, 100 unit/mL (3 mL) SubQ InPn pen, Inject 20 Units into the skin every evening., Disp: 15 mL, Rfl: 0    lactulose (CHRONULAC) 10 gram/15 mL solution, take 15ml (1 tablespoonful) by mouth as directed for test preperation, Disp: 15 mL, Rfl: 0    LIDOcaine (LIDODERM) 5 %, Apply to affected area. Use as directed. May use 4% lidocaine patch as alternative., Disp: 15 patch, Rfl: 1    lisinopril (PRINIVIL,ZESTRIL) 40 MG tablet, Take 40 mg by mouth once daily., Disp: , Rfl:     ondansetron (ZOFRAN-ODT) 4 MG TbDL, Take 1 tablet (4 mg total) by mouth every 8 (eight) hours as needed., Disp: 12 tablet, Rfl: 0    pen needle, diabetic (BD ULTRA-FINE SHORT PEN NEEDLE) 31 gauge x 5/16" Ndle, 1 each by Misc.(Non-Drug; Combo Route) route 4 (four) times daily before meals and nightly., Disp: 100 each, Rfl: 1    albuterol (PROVENTIL/VENTOLIN HFA) 90 mcg/actuation inhaler, Inhale 1-2 puffs into the lungs every 6 (six) hours as needed for Wheezing or Shortness of Breath (Please dispense with spacer)., Disp: 6.7 g, Rfl: 0    gabapentin (NEURONTIN) 100 MG capsule, Take 2 capsules (200 mg total) by mouth 3 (three) times daily., Disp: 90 capsule, Rfl: 0  "

## 2025-05-30 DIAGNOSIS — E11.65 POORLY CONTROLLED DIABETES MELLITUS: Primary | ICD-10-CM

## 2025-06-20 DIAGNOSIS — E11.9 TYPE 2 DIABETES MELLITUS WITHOUT COMPLICATION, WITHOUT LONG-TERM CURRENT USE OF INSULIN: Primary | ICD-10-CM

## 2025-06-24 NOTE — PROGRESS NOTES
"Subjective:      Patient ID: Annie Matthews is a 69 y.o. female.    Chief Complaint:  Diabetes and Follow-up    History of Present Illness  Annie Matthews is a 69 y.o. female with history significant for HTN, valvular heart disease, T2DM, GERD and is here for management of T2DM. Last seen by me 2025.    Interval history:  No major changes since the last visit. She states she fell down while cleaning her car last week. She was returning the vacuum hose and tripped. She injured her left hand and is still experiencing lower back pain. However, she denied a fracture.     With regards to type 2 diabetes:    Diagnosed: approximately   FH: sister  Known complications:  DKA : denies  RN : denies   Eye Exam: : 2025;  no laser surgery or DR  PN : denies- left foot numbness  Foot exam: Most Recent Foot Exam Date: Not Found  Nephropathy : denies  CAD : denies but has history of A-fib with a stent placed  Denies history of pancreatitis & personal/family history of medullary thyroid cancer.     Current regimen:  Lantus 22 units nightly  Novolog 6 units with meals + MDC (150/25). Gives 3 units for light meals like salads    Compliant  Rotating injection sites (arms and thighs). Denies contusions or lipohypertrophy.  Bolus timin minutes before meals    Other medications tried:  Jardiance- frequent UTI and yeast infections  Metformin- diarrhea    Glucose Monitor: Dexcom G7  >4 times a day testing  Log reviewed: unable to connect patient to clinic account and view Dexcom report  Viewed blood sugar trends in the past 24 hours on patient's phone  Majority of blood glucose "high"     Hypoglycemia:  Denies  Knows how to correct with 15 grams of carbs- juice, coke, or a peppermint.     Symptoms:  Polyuria denies  Polydipsia denies  Unintentional/unexplained weight changes : denies  Vision changes : denies  Snoring +     Diet/Exercise:  Eats 2 meals a day (skips breakfast). Avoids fried foods. Sick of salads " and chicken  Snacks : occasionally chips/pretzels   Drinks : water  Exercise : denies but has subscription to [employer], has chronic left ankle pain and stiffness; brother has a pool and she will try to start water aerobics  Recent illness, injury, steroids: denies  Allergic to strawberries    Diabetes education: 05/2025 with JAIR Rubi RD    Diabetes Management Status    Hemoglobin A1C   Date Value Ref Range Status   03/18/2025 11.9 (H) 4.0 - 5.6 % Final     Comment:     ADA Screening Guidelines:  5.7-6.4%  Consistent with prediabetes  >or=6.5%  Consistent with diabetes    High levels of fetal hemoglobin interfere with the HbA1C  assay. Heterozygous hemoglobin variants (HbS, HgC, etc)do  not significantly interfere with this assay.   However, presence of multiple variants may affect accuracy.     02/09/2025 14.0 (H) 4.0 - 5.6 % Final     Comment:     ADA Screening Guidelines:  5.7-6.4%  Consistent with prediabetes  >or=6.5%  Consistent with diabetes    High levels of fetal hemoglobin interfere with the HbA1C  assay. Heterozygous hemoglobin variants (HbS, HgC, etc)do  not significantly interfere with this assay.   However, presence of multiple variants may affect accuracy.     02/08/2025 14.0 (H) 4.0 - 5.6 % Final     Comment:     ADA Screening Guidelines:  5.7-6.4%  Consistent with prediabetes  >or=6.5%  Consistent with diabetes    High levels of fetal hemoglobin interfere with the HbA1C  assay. Heterozygous hemoglobin variants (HbS, HgC, etc)do  not significantly interfere with this assay.   However, presence of multiple variants may affect accuracy.       Hemoglobin A1c   Date Value Ref Range Status   07/10/2025 13.9 (H) 4.0 - 5.6 % Final     Comment:     ADA Screening Guidelines:  5.7-6.4%  Consistent with prediabetes  >=6.5%  Consistent with diabetes    High levels of fetal hemoglobin interfere with the HbA1C  assay. Heterozygous hemoglobin variants (HbS, HgC, etc)do  not significantly interfere with this  assay.   However, presence of multiple variants may affect accuracy.       Statin: Taking Atorvastatin 10 mg daily  ACE/ARB: Taking Lisinopril 40 mg daily + Amlodipine 10 mg daily  Screening or Prevention Patient's value Goal Complete/Controlled?   HgA1C Testing and Control   Lab Results   Component Value Date    HGBA1C 13.9 (H) 07/10/2025      Annually/Less than 8% No   Lipid profile : 03/18/2025 Annually Yes   LDL control Lab Results   Component Value Date    LDLCALC 144.6 03/18/2025    Annually/Less than 100 mg/dl  No   Nephropathy screening Lab Results   Component Value Date    LABMICR 103.0 07/10/2025     Lab Results   Component Value Date    PROTEINUA Trace (A) 03/18/2025    Annually Yes   Blood pressure BP Readings from Last 1 Encounters:   07/15/25 126/80    Less than 140/90 Yes   Dilated retinal exam : 04/23/2025 Annually Yes   Foot exam   Most Recent Foot Exam Date: Not Found Annually No     FIB-4 Calculation: 0.74 at 3/18/2025  3:06 PM  Calculated from:  SGOT/AST: 17 U/L at 3/18/2025  3:06 PM  SGPT/ALT: 27 U/L at 3/18/2025  3:06 PM  Platelets: 298 K/uL at 3/18/2025  3:06 PM  Age: 69 years     FIB-4 below 1.30 is considered as low-risk for advanced fibrosis  FIB-4 over 2.67 is considered as high-risk for advanced fibrosis  FIB-4 values between 1.30 and 2.67 are considered as intermediate-risk of advanced fibrosis for ages 36-64.     For ages > 64 the cut-off for low-risk goes to < 2.  This is a screening tool and clinical judgement should be used in the interpretation of these results.    Kidney Failure Risk Equation (Tangri)    Kidney Failure Risk at 2 years: 0.7%    Kidney Failure Risk at 5 years: 2.1%    Lab Results   Component Value Date    MICALBCREAT 92.8 (H) 07/10/2025    CREATININE 1.3 07/10/2025       With regards to osteopenia:    BMD 05/01/2025 ([place])  The L1 to L4 vertebral bone mineral density is equal to 1.487 g/cm squared with a T score of 2.4.     The left femoral neck bone mineral  "density is equal to 0.888 g/cm squared with a T score of -1.1.     The right femoral neck bone mineral density is equal to 0.871 g/cm squared with a T score of -1.2.     The total hip bone mineral density is equal to 1.091 g/cm squared with a T score of 0.7.     There is a 5.9% risk of a major osteoporotic fracture and a 0.5% risk of hip fracture in the next 10 years (FRAX).     Impression:  Osteopenia.      Falls : fell last week   Fractures : left ankle fracture from a fall 1 year ago slid out of the door, has 3 screws  Balance : pretty good  Exercise : N/A    Calcium : denies, leafy green vegetables, shrimp, oranges  Vitamin D : D3- unsure of dose    No results found for: "[id number]"      Review of Systems  As above    Objective:   Physical Exam  Constitutional:       Appearance: Normal appearance. She is obese.   Cardiovascular:      Rate and Rhythm: Normal rate.      Comments: No edema  Pulmonary:      Effort: Pulmonary effort is normal.   Musculoskeletal:      Comments: Dexcom G7 on lateral left arm  Injection sites are without edema or erythema. No lipo hypertropthy or atrophy.     Neurological:      Mental Status: She is alert.   Psychiatric:         Mood and Affect: Mood normal.         Behavior: Behavior normal.         Visit Vitals  /80 (BP Location: Right arm, Patient Position: Sitting)   Pulse 84   Ht 5' 2" (1.575 m)   Wt 93.6 kg (206 lb 7.4 oz)   SpO2 96%   BMI 37.76 kg/m²         Body mass index is 37.76 kg/m².    Lab Review:   Lab Results   Component Value Date    HGBA1C 13.9 (H) 07/10/2025    HGBA1C 11.9 (H) 03/18/2025    HGBA1C 14.0 (H) 02/09/2025       Lab Results   Component Value Date    CHOL 213 (H) 03/18/2025    HDL 48 03/18/2025    LDLCALC 144.6 03/18/2025    TRIG 102 03/18/2025    CHOLHDL 22.5 03/18/2025     Lab Results   Component Value Date     07/10/2025    K 5.1 07/10/2025     07/10/2025    CO2 25 07/10/2025     (H) 07/10/2025    BUN 21 07/10/2025    " "CREATININE 1.3 07/10/2025    CALCIUM 9.1 07/10/2025    PROT 7.3 07/10/2025    ALBUMIN 3.5 07/10/2025    BILITOT 0.6 07/10/2025    ALKPHOS 69 07/10/2025    AST 12 07/10/2025    ALT 18 07/10/2025    ANIONGAP 8 07/10/2025    ESTGFRAFRICA >60 12/21/2021    EGFRNONAA 53 (A) 12/21/2021    TSH 3.078 04/23/2025     No results found for: "[id number]"  Assessment and Plan     1. Obesity, morbid        2. Uncontrolled type 2 diabetes mellitus with hyperglycemia, with long-term current use of insulin  tirzepatide (MOUNJARO) 2.5 mg/0.5 mL PnIj    insulin aspart U-100 (NOVOLOG) 100 unit/mL (3 mL) InPn pen      3. Poorly controlled diabetes mellitus  pen needle, diabetic (BD ULTRA-FINE SHORT PEN NEEDLE) 31 gauge x 5/16" Ndle      4. Primary hypertension        5. Hyperlipidemia due to type 2 diabetes mellitus        6. Osteopenia of necks of both femurs              Poorly controlled diabetes mellitus   - A1c goal : <7.5%   - Last A1C 13.9%   - Reviewed logs/CGM: no logs/report to review. However, patient stays "high" 99% per Dexcom on phone.   - Will have patient follow-up with Diabetes Education to assist with Dexcom log-in   - Reviewed diabetic diet and exercise recommendations per ADA. Recommended water aerobics due to left ankle pain   - Recommend starting GLP1 agonist such as Mounjaro to assist with diabetes management and weight loss. Kidney function stable.   - Will continue current insulin regimen until able to review Dexcom report    Medication:    - Continue Lantus 22 units nightly   - Continue Novolog 6 units with meals + MDC (150/25)   - Start Mounjaro 2.5 mg weekly     - Avoid SGLT2 inhibitors ( infections) and Metformin (GI upset)     - Reviewed goals of therapy are to get the best control we can without hypoglycemia.   - Reviewed patient's current insulin regimen. Clarified proper insulin dose and timing in relation to meals, etc. Insulin injection sites and proper rotation instructed.     - Advised frequent " self blood glucose monitoring.  Patient encouraged to document glucose results and bring them to every clinic visit.   - Hypoglycemia precautions discussed. Instructed on precautions before driving.     - Call for Bg repeatedly < 70 or > 180.    - Close adherence to lifestyle changes recommended.    - Periodic follow ups for eye evaluations, foot care and dental care suggested.      Obesity, morbid   - Encouraged dietary and lifestyle modifications.   - Emphasized weight loss goals.   - Start GLP1 agonist    Hypertension   - On ACEI   - Controlled.   - Blood pressure goals discussed with patient.      Hyperlipidemia due to type 2 diabetes mellitus   - LDL goal <70   - Not controlled   - Continue Atorvastatin 10 mg daily per ADA recommendations   - May also improve with weight loss        Osteopenia of necks of both femurs   - Calcium and Vitamin D RDD provided.   - Weight-bearing exercise as tolerated   - Fall precautions made in the home.   - Repeat DEXA scan 05/2027      Follow up in about 3 months (around 10/15/2025).    Visit today included increased complexity associated with the care of the problems addressed and managing the longitudinal care of the patient due to the serious and/or complex managed problems.    Luda Brown PA-C

## 2025-06-24 NOTE — PATIENT INSTRUCTIONS
"Diabetes  A1C goal <7.0%  I will reach out to Ms. Rubi to schedule a follow-up for diabetes education follow-up.    Medications:  Continue Lantus 22 units nightly  Continue Novolog 6 units with meals + MDC (150/25)  Start Mounjaro 2.5 mg weekly    Skip Novolog if you skip meals or if blood sugar is less than 100 before meals.     Cholesterol  LDL goal is less than 70   Continue  Atorvastatin 10 mg daily    High cholesterol foods to avoid/limit:     C: Cheese, milk, dairy  A: Animal Fats: hot dogs and hamburgers  G: "Getting it away from home": going out to eat a lot  E: Extra Fat: cookies, candy, and sweets  F: Family History    Remember high cholesterol can clog your arteries and increase risk for heart attack or stroke.     Blood Pressure  Blood pressure goal is less than 130/80  Continue Lisinopril 40 mg daily + Amlodipine 10 mg daily    Osteopenia    Recommend daily allowance of calcium is 9999-0452 mg daily, preferably from food. See below  Recommend daily allowance of vitamin D is 9385-0547 IU daily    Weight bearing exercise as tolerated  https://www.bonehealthandosteoporosis.org/preventing-fractures/exercise-to-stay-healthy/    Avoid falls and fractures  https://www.bonehealthandosteoporosis.org/patients/fracturesfall-prevention/    Avoid alcohol and tobacco      Estimated Calcium Content of Foods:  Produce  Serving Size Estimated Calcium*    Alyce greens, frozen 8 oz 360 mg   Broccoli juan alberto 8 oz 200 mg   Kale, frozen 8 oz 180 mg   Soy Beans, green, boiled 8 oz 175 mg   Bok [place], cooked, boiled 8 oz 160 mg   Figs, dried 2 figs 65 mg   Broccoli, fresh, cooked 8 oz 60 mg   Oranges 1 whole 55 mg   Seafood Serving Size Estimated Calcium*    Sardines, canned with bones 3 oz 325 mg   [place], canned with bones 3 oz 180 mg   Shrimp, canned 3 oz 125 mg   Dairy Serving Size Estimated Calcium*    Ricotta, part-skim 4 oz 335 mg   Yogurt, plain, low-fat 6 oz 310 mg   Milk, skim, low-fat, whole 8 oz 300 mg   Yogurt " with fruit, low-fat 6 oz 260 mg   Mozzarella, part-skim 1 oz 210 mg   Cheddar 1 oz 205 mg   Yogurt, Greek 6 oz 200 mg   American Cheese 1 oz 195 mg   Feta Cheese 4 oz 140 mg   Cottage Cheese, 2% 4 oz 105 mg   Frozen yogurt, vanilla 8 oz 105 mg   Ice Cream, vanilla 8 oz 85 mg   Parmesan 1 tbsp 55 mg   Fortified Food Serving Size Estimated Calcium*   [place]lsah milk, rice milk or soy milk, fortified 8 oz 300 mg   Orange juice and other fruit juices, fortified 8 oz 300 mg   Tofu, prepared with calcium 4 oz 205 mg   Waffle, frozen, fortified 2 pieces 200 mg   Oatmeal, fortified 1 packet 140 mg   English muffin, fortified 1 muffin 100 mg   Cereal, fortified 8 oz 100-1,000 mg   Other Serving Size Estimated Calcium*   Mac & cheese, frozen 1 package 325 mg   Pizza, cheese, frozen 1 serving 115 mg   Pudding, chocolate, prepared with 2% milk 4 oz 160 mg   Beans, baked, canned 4 oz 160 mg   *The calcium content listed for most foods is estimated and can vary due to multiple factors. Check the food label to determine how much calcium is in a particular product.  If you read the nutrition label for a food source, it lists the % calcium in that food.  For an 8 oz glass of milk, for example, the label states calcium 30%.  This is equivalent to 300 mg of calcium (multiply the listed number by 10).   **Table from the National Osteoporosis Foundation

## 2025-07-10 ENCOUNTER — LAB VISIT (OUTPATIENT)
Dept: LAB | Facility: OTHER | Age: 70
End: 2025-07-10
Attending: INTERNAL MEDICINE
Payer: MEDICARE

## 2025-07-10 DIAGNOSIS — E11.65 POORLY CONTROLLED DIABETES MELLITUS: ICD-10-CM

## 2025-07-10 DIAGNOSIS — E11.65 UNCONTROLLED TYPE 2 DIABETES MELLITUS WITH HYPERGLYCEMIA, WITH LONG-TERM CURRENT USE OF INSULIN: ICD-10-CM

## 2025-07-10 DIAGNOSIS — E11.9 TYPE 2 DIABETES MELLITUS WITHOUT COMPLICATION, WITHOUT LONG-TERM CURRENT USE OF INSULIN: ICD-10-CM

## 2025-07-10 DIAGNOSIS — Z79.4 UNCONTROLLED TYPE 2 DIABETES MELLITUS WITH HYPERGLYCEMIA, WITH LONG-TERM CURRENT USE OF INSULIN: ICD-10-CM

## 2025-07-10 LAB
ALBUMIN SERPL BCP-MCNC: 3.5 G/DL (ref 3.5–5.2)
ALBUMIN/CREAT UR: 92.8 UG/MG
ALP SERPL-CCNC: 69 UNIT/L (ref 40–150)
ALT SERPL W/O P-5'-P-CCNC: 18 UNIT/L (ref 10–44)
ANION GAP (OHS): 8 MMOL/L (ref 8–16)
AST SERPL-CCNC: 12 UNIT/L (ref 11–45)
BILIRUB SERPL-MCNC: 0.6 MG/DL (ref 0.1–1)
BUN SERPL-MCNC: 21 MG/DL (ref 8–23)
CALCIUM SERPL-MCNC: 9.1 MG/DL (ref 8.7–10.5)
CHLORIDE SERPL-SCNC: 103 MMOL/L (ref 95–110)
CO2 SERPL-SCNC: 25 MMOL/L (ref 23–29)
CREAT SERPL-MCNC: 1.3 MG/DL (ref 0.5–1.4)
CREAT UR-MCNC: 111 MG/DL (ref 15–325)
EAG (OHS): 352 MG/DL (ref 68–131)
GFR SERPLBLD CREATININE-BSD FMLA CKD-EPI: 45 ML/MIN/1.73/M2
GLUCOSE SERPL-MCNC: 403 MG/DL (ref 70–110)
HBA1C MFR BLD: 13.9 % (ref 4–5.6)
MICROALBUMIN UR-MCNC: 103 UG/ML (ref ?–5000)
POTASSIUM SERPL-SCNC: 5.1 MMOL/L (ref 3.5–5.1)
PROT SERPL-MCNC: 7.3 GM/DL (ref 6–8.4)
SODIUM SERPL-SCNC: 136 MMOL/L (ref 136–145)

## 2025-07-10 PROCEDURE — 83036 HEMOGLOBIN GLYCOSYLATED A1C: CPT

## 2025-07-10 PROCEDURE — 84450 TRANSFERASE (AST) (SGOT): CPT

## 2025-07-10 PROCEDURE — 82570 ASSAY OF URINE CREATININE: CPT

## 2025-07-10 PROCEDURE — 36415 COLL VENOUS BLD VENIPUNCTURE: CPT

## 2025-07-10 RX ORDER — INSULIN ASPART 100 [IU]/ML
3 INJECTION, SOLUTION INTRAVENOUS; SUBCUTANEOUS
Qty: 15 ML | Refills: 0 | OUTPATIENT
Start: 2025-07-10 | End: 2026-07-10

## 2025-07-10 NOTE — TELEPHONE ENCOUNTER
Refill Encounter    PCP Visits: Recent Visits  No visits were found meeting these conditions.  Showing recent visits within past 360 days and meeting all other requirements  Future Appointments  No visits were found meeting these conditions.  Showing future appointments within next 720 days and meeting all other requirements      Last 3 Blood Pressure:   BP Readings from Last 3 Encounters:   05/23/25 120/80   05/14/25 122/86   04/23/25 132/69     Preferred Pharmacy:   [facility]  [address] [address]  Phone: [phone] Fax: [phone]    Requested RX:  Requested Prescriptions     Pending Prescriptions Disp Refills    insulin aspart U-100 (NOVOLOG FLEXPEN U-100 INSULIN) 100 unit/mL (3 mL) InPn pen 15 mL 0     Sig: Inject 3 Units into the skin 3 (three) times daily with meals. Check glucose before administering. Hold if blood sugar is less than 150.      RX Route: Normal  Allergies confirmed

## 2025-07-10 NOTE — TELEPHONE ENCOUNTER
Copied from CRM #[id number]. Topic: Medications - Medication Refill  >> Jul 10, 2025  1:09 PM Med Assistant Claire wrote:  Type: RX Refill Request    Who Called:  patient    Have you contacted your pharmacy: yes    Refill or New Rx: refill    RX Name and Strength:  insulin aspart U-100 (NOVOLOG) 100 unit/mL (3 mL) InPn pen     How is the patient currently taking it? (ex. 1XDay):  Inject 3 Units into the skin 3 (three) times daily with meals. Check glucose before administering.    Is this a 30 day or 90 day RX: 90    Preferred Pharmacy with phone number:  [facility]     Local or Mail Order:  local    Ordering Provider:  Toño Martinez MD    Would the patient rather a call back or a response via [facility]?  call    Best Call Back Number: [phone] (M)

## 2025-07-10 NOTE — TELEPHONE ENCOUNTER
Medication Pending  Allergies Confirm   LOV  05/23/2025      Refill Encounter    PCP Visits: Recent Visits  Date Type Provider Dept   05/23/25 Office Visit Toño Martinez MD [facility]   04/23/25 Office Visit Toño Martinez MD [facility]   03/18/25 Office Visit Toño Martinez MD [facility]   Showing recent visits within past 360 days and meeting all other requirements  Future Appointments  No visits were found meeting these conditions.  Showing future appointments within next 720 days and meeting all other requirements      Last 3 Blood Pressure:   BP Readings from Last 3 Encounters:   05/23/25 120/80   05/14/25 122/86   04/23/25 132/69     Preferred Pharmacy:   [facility] [place] [address]  Phone: [phone] Fax: [phone]    Requested RX:  Requested Prescriptions     Pending Prescriptions Disp Refills    insulin aspart U-100 (NOVOLOG) 100 unit/mL (3 mL) InPn pen 15 mL 6     Sig: Inject 3 Units into the skin 3 (three) times daily with meals. Check glucose before administering. Hold if blood sugar is less than 150.      RX Route: Normal

## 2025-07-10 NOTE — TELEPHONE ENCOUNTER
Refill Decision Note   Annie Matthews  is requesting a refill authorization.  Brief Assessment and Rationale for Refill:  Quick Discontinue     Medication Therapy Plan:  The original prescription was reordered on 5/23/2025 by Toño Martinez MD.      Comments:     Note composed:5:00 PM 07/10/2025

## 2025-07-11 RX ORDER — INSULIN ASPART 100 [IU]/ML
3 INJECTION, SOLUTION INTRAVENOUS; SUBCUTANEOUS
Qty: 15 ML | Refills: 0 | Status: SHIPPED | OUTPATIENT
Start: 2025-07-11 | End: 2026-07-11

## 2025-07-15 ENCOUNTER — OFFICE VISIT (OUTPATIENT)
Dept: ENDOCRINOLOGY | Facility: CLINIC | Age: 70
End: 2025-07-15
Payer: MEDICARE

## 2025-07-15 VITALS
HEART RATE: 84 BPM | SYSTOLIC BLOOD PRESSURE: 126 MMHG | WEIGHT: 206.44 LBS | OXYGEN SATURATION: 96 % | HEIGHT: 62 IN | DIASTOLIC BLOOD PRESSURE: 80 MMHG | BODY MASS INDEX: 37.99 KG/M2

## 2025-07-15 DIAGNOSIS — E11.65 UNCONTROLLED TYPE 2 DIABETES MELLITUS WITH HYPERGLYCEMIA, WITH LONG-TERM CURRENT USE OF INSULIN: ICD-10-CM

## 2025-07-15 DIAGNOSIS — M85.852 OSTEOPENIA OF NECKS OF BOTH FEMURS: ICD-10-CM

## 2025-07-15 DIAGNOSIS — E11.65 POORLY CONTROLLED DIABETES MELLITUS: ICD-10-CM

## 2025-07-15 DIAGNOSIS — I10 PRIMARY HYPERTENSION: ICD-10-CM

## 2025-07-15 DIAGNOSIS — E66.01 OBESITY, MORBID: Primary | ICD-10-CM

## 2025-07-15 DIAGNOSIS — Z79.4 UNCONTROLLED TYPE 2 DIABETES MELLITUS WITH HYPERGLYCEMIA, WITH LONG-TERM CURRENT USE OF INSULIN: ICD-10-CM

## 2025-07-15 DIAGNOSIS — E78.5 HYPERLIPIDEMIA DUE TO TYPE 2 DIABETES MELLITUS: ICD-10-CM

## 2025-07-15 DIAGNOSIS — E11.69 HYPERLIPIDEMIA DUE TO TYPE 2 DIABETES MELLITUS: ICD-10-CM

## 2025-07-15 DIAGNOSIS — M85.851 OSTEOPENIA OF NECKS OF BOTH FEMURS: ICD-10-CM

## 2025-07-15 PROCEDURE — 3008F BODY MASS INDEX DOCD: CPT | Mod: CPTII,S$GLB,,

## 2025-07-15 PROCEDURE — 1126F AMNT PAIN NOTED NONE PRSNT: CPT | Mod: CPTII,S$GLB,,

## 2025-07-15 PROCEDURE — 1101F PT FALLS ASSESS-DOCD LE1/YR: CPT | Mod: CPTII,S$GLB,,

## 2025-07-15 PROCEDURE — 3288F FALL RISK ASSESSMENT DOCD: CPT | Mod: CPTII,S$GLB,,

## 2025-07-15 PROCEDURE — G2211 COMPLEX E/M VISIT ADD ON: HCPCS | Mod: S$GLB,,,

## 2025-07-15 PROCEDURE — 99999 PR PBB SHADOW E&M-EST. PATIENT-LVL III: CPT | Mod: PBBFAC,,,

## 2025-07-15 PROCEDURE — 3074F SYST BP LT 130 MM HG: CPT | Mod: CPTII,S$GLB,,

## 2025-07-15 PROCEDURE — 3079F DIAST BP 80-89 MM HG: CPT | Mod: CPTII,S$GLB,,

## 2025-07-15 PROCEDURE — 3066F NEPHROPATHY DOC TX: CPT | Mod: CPTII,S$GLB,,

## 2025-07-15 PROCEDURE — 3060F POS MICROALBUMINURIA REV: CPT | Mod: CPTII,S$GLB,,

## 2025-07-15 PROCEDURE — 4010F ACE/ARB THERAPY RXD/TAKEN: CPT | Mod: CPTII,S$GLB,,

## 2025-07-15 PROCEDURE — 99214 OFFICE O/P EST MOD 30 MIN: CPT | Mod: S$GLB,,,

## 2025-07-15 PROCEDURE — 3046F HEMOGLOBIN A1C LEVEL >9.0%: CPT | Mod: CPTII,S$GLB,,

## 2025-07-15 RX ORDER — PEN NEEDLE, DIABETIC 30 GX3/16"
NEEDLE, DISPOSABLE MISCELLANEOUS
Qty: 100 EACH | Refills: 3 | Status: SHIPPED | OUTPATIENT
Start: 2025-07-15

## 2025-07-15 RX ORDER — TIRZEPATIDE 2.5 MG/.5ML
2.5 INJECTION, SOLUTION SUBCUTANEOUS
Qty: 2 ML | Refills: 11 | Status: SHIPPED | OUTPATIENT
Start: 2025-07-15 | End: 2026-07-15

## 2025-07-15 RX ORDER — INSULIN ASPART 100 [IU]/ML
6 INJECTION, SOLUTION INTRAVENOUS; SUBCUTANEOUS
Qty: 15 ML | Refills: 3 | Status: SHIPPED | OUTPATIENT
Start: 2025-07-15 | End: 2026-07-15

## 2025-07-15 NOTE — ASSESSMENT & PLAN NOTE
" - A1c goal : <7.5%   - Last A1C 13.9%   - Reviewed logs/CGM: no logs/report to review. However, patient stays "high" 99% per Dexcom on phone.   - Will have patient follow-up with Diabetes Education to assist with Dexcom log-in   - Reviewed diabetic diet and exercise recommendations per ADA. Recommended water aerobics due to left ankle pain   - Recommend starting GLP1 agonist such as Mounjaro to assist with diabetes management and weight loss. Kidney function stable.   - Will continue current insulin regimen until able to review Dexcom report    Medication:    - Continue Lantus 22 units nightly   - Continue Novolog 6 units with meals + MDC (150/25)   - Start Mounjaro 2.5 mg weekly     - Avoid SGLT2 inhibitors ( infections) and Metformin (GI upset)     - Reviewed goals of therapy are to get the best control we can without hypoglycemia.   - Reviewed patient's current insulin regimen. Clarified proper insulin dose and timing in relation to meals, etc. Insulin injection sites and proper rotation instructed.     - Advised frequent self blood glucose monitoring.  Patient encouraged to document glucose results and bring them to every clinic visit.   - Hypoglycemia precautions discussed. Instructed on precautions before driving.     - Call for Bg repeatedly < 70 or > 180.    - Close adherence to lifestyle changes recommended.    - Periodic follow ups for eye evaluations, foot care and dental care suggested.    "

## 2025-07-15 NOTE — ASSESSMENT & PLAN NOTE
- LDL goal <70   - Not controlled   - Continue Atorvastatin 10 mg daily per ADA recommendations   - May also improve with weight loss

## 2025-07-21 DIAGNOSIS — E78.5 HYPERLIPIDEMIA DUE TO TYPE 2 DIABETES MELLITUS: ICD-10-CM

## 2025-07-21 DIAGNOSIS — E11.69 HYPERLIPIDEMIA DUE TO TYPE 2 DIABETES MELLITUS: ICD-10-CM

## 2025-07-21 DIAGNOSIS — E11.65 UNCONTROLLED TYPE 2 DIABETES MELLITUS WITH HYPERGLYCEMIA, WITH LONG-TERM CURRENT USE OF INSULIN: Primary | ICD-10-CM

## 2025-07-21 DIAGNOSIS — Z79.4 UNCONTROLLED TYPE 2 DIABETES MELLITUS WITH HYPERGLYCEMIA, WITH LONG-TERM CURRENT USE OF INSULIN: Primary | ICD-10-CM

## 2025-07-23 ENCOUNTER — NURSE TRIAGE (OUTPATIENT)
Dept: ADMINISTRATIVE | Facility: CLINIC | Age: 70
End: 2025-07-23
Payer: MEDICARE

## 2025-07-23 NOTE — TELEPHONE ENCOUNTER
Pt stated she was prescribed Mounjaro and she wanted to know if she was still suppose to take her Lantus. Informed pt yes she is still suppose to take her medications as prescribed. Diabetic medications reviewed with pt. Pt verbalized understanding.   Reason for Disposition   Health Information question, no triage required and triager able to answer question    Protocols used: Information Only Call-A-AH

## 2025-07-24 ENCOUNTER — CLINICAL SUPPORT (OUTPATIENT)
Dept: DIABETES | Facility: CLINIC | Age: 70
End: 2025-07-24
Payer: MEDICARE

## 2025-07-24 ENCOUNTER — TELEPHONE (OUTPATIENT)
Dept: ENDOCRINOLOGY | Facility: CLINIC | Age: 70
End: 2025-07-24
Payer: MEDICARE

## 2025-07-24 DIAGNOSIS — E11.65 POORLY CONTROLLED DIABETES MELLITUS: Primary | ICD-10-CM

## 2025-07-24 PROCEDURE — G0108 DIAB MANAGE TRN  PER INDIV: HCPCS | Mod: S$GLB,,, | Performed by: DIETITIAN, REGISTERED

## 2025-07-24 NOTE — TELEPHONE ENCOUNTER
Please call to inform the patient with the following:     Please continue Lantus 22 units nightly and Novolog 6 units with meals. Add Novolog correction scale if needed.  Blood sugar 150 to 200 add 2 units  Blood sugar 201 to 250 add 4 units  Blood sugar 251 to 300 add 6 units  Blood sugar 301 to 350 add 8 units  Blood sugar greater than 350 add 10 units     Start Mounjaro 2.5 mg weekly.  Please continue with your appointment with diabetes education today. She will help get your Dexcom connected so that I can review it and make additional medication adjustments.     Thank you!

## 2025-07-25 VITALS — BODY MASS INDEX: 36.94 KG/M2 | HEIGHT: 62 IN | WEIGHT: 200.75 LBS

## 2025-07-25 NOTE — PROGRESS NOTES
"Diabetes Care Specialist Progress Note  Author: Nhi Flowers RD, CDCES  Date: 7/25/2025    Intake    Program Intake  Reason for Diabetes Program Visit:: Intervention  Type of Intervention:: Individual  Individual: Education  Current diabetes risk level:: high  In the last month, have you used the ER or been admitted to the hospital: No    Current Diabetes Treatment: Insulin  Method of insulin delivery?: Injections  Injection Type: Pens  Pen Type/Dose: Lantus 22 HS, Novolog 6 AC + /25    Continuous Glucose Monitoring  Patient has CGM: Yes  Personal CGM type:: Dexcom G7 - unable to view report d/t edie is not logged in    Lab Results   Component Value Date    HGBA1C 13.9 (H) 07/10/2025       Weight: 91 kg (200 lb 11.7 oz)   Height: 5' 2" (157.5 cm)   Body mass index is 36.71 kg/m².      Diabetes Self-Management Skills Assessment    Medication Skills Assessment  Patient is able to identify current diabetes medications, dosages, and appropriate timing of medications.: yes  Patient reports problems or concerns with current medication regimen.: yes  Medication regimen problems/concerns:: does not feel like regimen is working, concerned about side effects  Patient is  aware that some diabetes medications can cause low blood sugar?: Yes  Medication Skills Assessment Completed:: Yes  Assessment indicates:: Instruction Needed  Area of need?: Yes     Home Blood Glucose Monitoring  Personal CGM type:: Dexcom G7 - unable to view report d/t edie is not logged in      Today's interventions were provided through individual discussion, instruction, and written materials were provided.      Patient verbalized understanding of instruction and written materials.  Pt was able to return back demonstration of instructions today. Patient understood key points, needs reinforcement and further instruction.     Diabetes Self-Management Care Plan:    Today's Diabetes Self-Management Care Plan was developed with Annie's input. Annie " has agreed to work toward the following goal(s) to improve his/her overall diabetes control.      Care Plan: Diabetes Management   Updates made since 7/25/2024 12:00 AM        Problem: Medications         Goal: Patient Agrees to take Diabetes Medications as prescribed.    Start Date: 5/19/2025   Expected End Date: 6/9/2025   This Visit's Progress: On track   Priority: High   Barriers: Other (comments)   Note:    5/19/25-Pt does not like taking insulin. She is taking her insulin daily but has not started the sliding scale instructions. Ed on sliding scale instructions. Pt verbalized understanding and states she will follow the sliding scale instructions to improve her BG.     7/24/25 - Pt states she believes insulin is causing her nausea. Explained insulin does not cause GI s/e but quite likely feeling nauseated d/t chronically high glucose (often seeing readings 300s-HI). She does not like taking insulin. Reinforced risk of complications if glucose remains high and importance of taking insulin consistently to gain better control. Reviewed prescribed MDI regimen, correction scale dosing with Novolog and site selection and rotation. She reports she has been injecting in front of shoulder and is sometimes painful - reviewed must inject into fatty tissue for proper absorption. She has picked up but not yet started Mounjaro. She plans to start it this weekend in case of GI upset.   Ed on MOA and how GLP-1 uniquely impacts BG in multiple ways   Ed on injection technique, schedule, storage and dosing titration    Ed on possible side effects and strategies to reduce        Task: Reviewed with patient all current diabetes medications and provided basic review of the purpose, dosage, frequency, side effects, and storage of both oral and injectable diabetes medications. Completed 5/21/2025        Task: Discussed guidelines for preventing, detecting and treating hypoglycemia and hyperglycemia and reviewed the importance of meal  and medication timing with diabetes mediations for prevention of hypoglycemia and maximum drug benefit. Completed 5/21/2025        Problem: Healthy Eating         Long-Range Goal: Omit sweet beverages from diet    Start Date: 5/19/2025   Expected End Date: 6/9/2025   This Visit's Progress: No change   Priority: Medium   Barriers: No Barriers Identified   Note:    5/19/25-Ed on plate method. Stressed no sweet beverages.    7/24/25 - Has cut out sodas but still drinking regular lemonade and regular sweet tea. Reviewed sugar content of drinks using visual examples.Encouraged avoiding sugary drinks completely and sticking to water or zero sugar/sugar free drinks. Diet recall: B- eggs, lane, L- salad with chicken, D- 2 crabs, snack- small bag chips. Sometimes skips meals. She would like a meal plan to work off of. Reviewed sources of carb, portion sizes using dry measuring cups, meal planning with plate method, and reviewed examples of meals.       Task: Reviewed the sources and role of Carbohydrate, Protein, and Fat and how each nutrient impacts blood sugar. Completed 7/25/2025        Task: Provided visual examples using dry measuring cups, food models, and other familiar objects such as computer mouse, deck or cards, tennis ball etc. to help with visualization of portions. Completed 7/25/2025        Task: Recommended replacing beverages containing high sugar content with noncaloric/sugar free options and/or water. Completed 5/21/2025        Task: Review the importance of balancing carbohydrates with each meal using portion control techniques to count servings of carbohydrate and label reading to identify serving size and amount of total carbs per serving. Completed 7/25/2025        Task: Provided Sample plate method and reviewed the use of the plate to estimate amounts of carbohydrate per meal. Completed 5/21/2025        Problem: Blood Glucose Self-Monitoring         Goal: Pt will keep logged into Dexcom edie.    Start  Date: 7/24/2025   Expected End Date: 8/24/2025   Priority: High   Barriers: No Barriers Identified   Note:    7/24/25 - Unable to view Dexcom report and endocrine needing report to aid in insulin titration. Pt is logged out on edie and does not know username and password. Attempted to reset in edie but edie not loading. Reset password on desktop. Removed edie and re-downloaded. Logged in and re-set up Dexcom G7 edie and linked to pt's current sensor. Provided pt with written down username and password and she saved in phone. Encouraged keep edie logged in so that will store data and will be able to view reports. Reviewed glucose goals and basic pattern management concepts.     U: [phone]  P: Amelia  Linked to [facility]        Task: Reviewed the importance of self-monitoring blood glucose and keeping logs. Completed 7/25/2025          Follow Up Plan     Follow up in about 18 days (around 8/11/2025) for 2 week follow-up.    Today's care plan and follow up schedule was discussed with patient.  Annie verbalized understanding of the care plan, goals, and agrees to follow up plan.        The patient was encouraged to communicate with his/her health care provider/physician and care team regarding his/her condition(s) and treatment.  I provided the patient with my contact information today and encouraged to contact me via phone or [facility] as needed.     Length of Visit   Total Time: 60 Minutes

## 2025-08-07 ENCOUNTER — TELEPHONE (OUTPATIENT)
Dept: INTERNAL MEDICINE | Facility: CLINIC | Age: 70
End: 2025-08-07
Payer: MEDICARE

## 2025-08-07 DIAGNOSIS — H92.02 LEFT EAR PAIN: Primary | ICD-10-CM

## 2025-08-08 ENCOUNTER — OFFICE VISIT (OUTPATIENT)
Dept: OTOLARYNGOLOGY | Facility: CLINIC | Age: 70
End: 2025-08-08
Payer: MEDICARE

## 2025-08-08 DIAGNOSIS — H92.02 OTALGIA OF LEFT EAR: Primary | ICD-10-CM

## 2025-08-08 DIAGNOSIS — K11.20 SIALADENITIS: ICD-10-CM

## 2025-08-08 DIAGNOSIS — R68.84 JAW PAIN: ICD-10-CM

## 2025-08-08 PROCEDURE — 99999 PR PBB SHADOW E&M-EST. PATIENT-LVL III: CPT | Mod: PBBFAC,,, | Performed by: PHYSICIAN ASSISTANT

## 2025-08-08 RX ORDER — AMOXICILLIN AND CLAVULANATE POTASSIUM 875; 125 MG/1; MG/1
1 TABLET, FILM COATED ORAL 2 TIMES DAILY
Qty: 20 TABLET | Refills: 0 | Status: SHIPPED | OUTPATIENT
Start: 2025-08-08 | End: 2025-08-18

## 2025-08-08 NOTE — PROGRESS NOTES
Ear, Nose, & Throat  Otolaryngology - Head & Neck Surgery    Summary of Visit:  Annie Matthews was self-referred for Otalgia (AS)       Subjective:     Chief Complaint: right ear pain      Annie Matthews is a 69 y.o. female who presents for left sided ear pain onset 1 week. She states the pain is near/behind hear ear and cheek and worse at night. She states she noticed it was painful when chewing and had to chew food on the right side of her mouth. She did notice some swelling. She states one month ago she presented to  and was treated for outer ear infection with PO abx and ear drops. This resolved. She does chew gum, unsure if she grinds her teeth at night.She denies fever, weight loss, tooth pain, recent illness, dysphagia, throat pain, tinnitus, vertigo, otorrhea, decreased hearing.    Tobacco:none  Etoh:none  Head and neck sx: none    Past Medical History  Past Medical History:   Diagnosis Date    Atrial fibrillation     Diabetes mellitus     Diabetes mellitus, type 2     Hypertension        Past Surgical History    Past Surgical History:   Procedure Laterality Date    HYSTERECTOMY          Family History  Family History   Problem Relation Name Age of Onset    Heart failure Mother      Heart attack Father      Cancer Sister         Social History  Social History     Socioeconomic History    Marital status: Single   Tobacco Use    Smoking status: Never     Passive exposure: Never    Smokeless tobacco: Never   Substance and Sexual Activity    Alcohol use: No    Drug use: No    Sexual activity: Not Currently     Social Drivers of Health     Financial Resource Strain: Medium Risk (5/12/2025)    Overall Financial Resource Strain (CARDIA)     Difficulty of Paying Living Expenses: Somewhat hard   Food Insecurity: Food Insecurity Present (5/12/2025)    Hunger Vital Sign     Worried About Running Out of Food in the Last Year: Sometimes true     Ran Out of Food in the Last Year: Sometimes true    Transportation Needs: No Transportation Needs (5/12/2025)    PRAPARE - Transportation     Lack of Transportation (Medical): No     Lack of Transportation (Non-Medical): No   Physical Activity: Inactive (5/12/2025)    Exercise Vital Sign     Days of Exercise per Week: 2 days     Minutes of Exercise per Session: 0 min   Stress: No Stress Concern Present (5/12/2025)    [employer] [place] [employer] - Occupational Stress Questionnaire     Feeling of Stress : Only a little   Housing Stability: Low Risk  (5/12/2025)    Housing Stability Vital Sign     Unable to Pay for Housing in the Last Year: No     Number of Times Moved in the Last Year: 0     Homeless in the Last Year: No       Allergies  Strawberry, Sulfa (sulfonamide antibiotics), Tomato, and Codeine    Medications  Current Outpatient Medications   Medication Sig Dispense Refill    albuterol (PROVENTIL/VENTOLIN HFA) 90 mcg/actuation inhaler Inhale 1-2 puffs into the lungs every 6 (six) hours as needed for Wheezing or Shortness of Breath (Please dispense with spacer). 6.7 g 0    amLODIPine (NORVASC) 10 MG tablet Take 1 tablet (10 mg total) by mouth once daily. 90 tablet 3    amoxicillin-clavulanate 875-125mg (AUGMENTIN) 875-125 mg per tablet Take 1 tablet by mouth 2 (two) times daily. for 10 days 20 tablet 0    aspirin (ECOTRIN) 81 MG EC tablet Take 81 mg by mouth once daily.      atorvastatin (LIPITOR) 10 MG tablet Take 10 mg by mouth once daily.      blood-glucose sensor (DEXCOM G6 SENSOR) Gracie Patient to check blood sugar up to every 5 minutes 10 each 3    diclofenac sodium (VOLTAREN) 1 % Gel Apply 2 g topically 3 (three) times daily as needed (muscle spasm pain). Apply 2 grams to affected area 3 times daily as needed 100 g 0    dicyclomine (BENTYL) 10 MG capsule Take 1 capsule (10 mg total) by mouth once daily. 30 capsule 3    gabapentin (NEURONTIN) 100 MG capsule Take 2 capsules (200 mg total) by mouth 3 (three) times daily. 90 capsule 0    insulin  "aspart U-100 (NOVOLOG) 100 unit/mL (3 mL) InPn pen Inject 6 Units into the skin 3 (three) times daily with meals. Check glucose before administering. Hold if blood sugar is less than 150. 15 mL 3    insulin glargine U-100, Lantus, 100 unit/mL (3 mL) SubQ InPn pen Inject 25 Units into the skin every evening. 15 mL 6    lactulose (CHRONULAC) 10 gram/15 mL solution take 15ml (1 tablespoonful) by mouth as directed for test preperation 15 mL 0    LIDOcaine (LIDODERM) 5 % Apply to affected area. Use as directed. May use 4% lidocaine patch as alternative. 15 patch 1    lisinopril (PRINIVIL,ZESTRIL) 40 MG tablet Take 40 mg by mouth once daily.      ondansetron (ZOFRAN-ODT) 4 MG TbDL Dissolve 1 tablet (4 mg total) by mouth every 8 (eight) hours as needed. 20 tablet 2    pen needle, diabetic (BD ULTRA-FINE BRAD PEN NEEDLE) 32 gauge x 5/32" Ndle Use for long acting and shorting acting insulin 4 times daily. 100 each 3    tirzepatide (MOUNJARO) 2.5 mg/0.5 mL PnIj Inject 2.5 mg into the skin every 7 days. 2 mL 11     No current facility-administered medications for this visit.       ROS:  Pertinent positive and negative review of systems as noted in HPI.     Objective:     There were no vitals taken for this visit.     Physical Exam    General Appearance:   Awake, Alert and Oriented. NAD. Appropriate affect and appearance      Neuro:   Spontaneous eye opening, appropriate verbal responses, follows commands  Pupils equal, round & brisk. EOMI, no proptosis  Face is symmetric, non-edematous bilaterally     Head and Face:   Skin is intact with no lesions noted. There is some tenderness over the left parotid, TMJ, and oral/buccal mucosa, no janae purulence from duct     Ears:  Periauricular regions non-erythematous, non-fluctuanct non-tender  Pinna normal bilaterally, no skin lesions  EACs patent and without drainage bilaterally   Tympanic membranes are translucent and intact.  No middle ear effusion noted bilaterally.    Nose: "   External nose is symmetric, no skin lesions    OC/OP:  Tongue midline on extension, non-edematous, soft  No labial, buccal, oral tongue or floor of mouth lesions  Soft palate symmetric, midline and without lesions or masses,  No masses or lesions of the visualized oropharynx     Neck:  Neck is symmetric, non-edematous, non-erythematous  Trachea is midline  No thyromegaly, thyroid nodules or masses, non-tender   No palpable adenopathy or masses in levels I-VI     Respiratory:  Normal work of breathing, no accessory muscle use, no stridor     Voice:  Normal vocal quality, volume and articulation    Data Review:   LABS        IMAGING        AUDIO      Procedures:           Assessment and Plan:     1. Otalgia of left ear    2. Jaw pain    3. Sialadenitis    Other orders  -     amoxicillin-clavulanate 875-125mg (AUGMENTIN) 875-125 mg per tablet; Take 1 tablet by mouth 2 (two) times daily. for 10 days  Dispense: 20 tablet; Refill: 0    I had a long discussion with the patient regarding her condition and the further workup and management options. Will treat as potential parotitis. Get some sugar free lemondrops and chew at least one per hour. This increases saliva flow in the affected gland. Drink plenty of fluids.    Massage your left cheek gland from the ear lobe to the mouth several times per day - this pushes the saliva forward.    Place a heating pad on the affected area three times a day for about 10-15 minutes. Questions were encouraged and answered. She will follow up in 1 week. The patient understands to return sooner for any new symptoms/concerns/worsening prior to any scheduled visits.          Problem List Items Addressed This Visit    None

## 2025-08-18 ENCOUNTER — OFFICE VISIT (OUTPATIENT)
Dept: OTOLARYNGOLOGY | Facility: CLINIC | Age: 70
End: 2025-08-18
Payer: MEDICARE

## 2025-08-18 DIAGNOSIS — R68.84 JAW PAIN: ICD-10-CM

## 2025-08-18 DIAGNOSIS — Z09 FOLLOW-UP EXAM AFTER TREATMENT: Primary | ICD-10-CM

## 2025-08-18 DIAGNOSIS — H92.02 OTALGIA OF LEFT EAR: ICD-10-CM

## 2025-08-18 PROCEDURE — 3060F POS MICROALBUMINURIA REV: CPT | Mod: CPTII,S$GLB,, | Performed by: PHYSICIAN ASSISTANT

## 2025-08-18 PROCEDURE — 3288F FALL RISK ASSESSMENT DOCD: CPT | Mod: CPTII,S$GLB,, | Performed by: PHYSICIAN ASSISTANT

## 2025-08-18 PROCEDURE — 99212 OFFICE O/P EST SF 10 MIN: CPT | Mod: S$GLB,,, | Performed by: PHYSICIAN ASSISTANT

## 2025-08-18 PROCEDURE — 1101F PT FALLS ASSESS-DOCD LE1/YR: CPT | Mod: CPTII,S$GLB,, | Performed by: PHYSICIAN ASSISTANT

## 2025-08-18 PROCEDURE — 3046F HEMOGLOBIN A1C LEVEL >9.0%: CPT | Mod: CPTII,S$GLB,, | Performed by: PHYSICIAN ASSISTANT

## 2025-08-18 PROCEDURE — 4010F ACE/ARB THERAPY RXD/TAKEN: CPT | Mod: CPTII,S$GLB,, | Performed by: PHYSICIAN ASSISTANT

## 2025-08-18 PROCEDURE — 1126F AMNT PAIN NOTED NONE PRSNT: CPT | Mod: CPTII,S$GLB,, | Performed by: PHYSICIAN ASSISTANT

## 2025-08-18 PROCEDURE — 99999 PR PBB SHADOW E&M-EST. PATIENT-LVL III: CPT | Mod: PBBFAC,,, | Performed by: PHYSICIAN ASSISTANT

## 2025-08-18 PROCEDURE — 1159F MED LIST DOCD IN RCRD: CPT | Mod: CPTII,S$GLB,, | Performed by: PHYSICIAN ASSISTANT

## 2025-08-18 PROCEDURE — 3066F NEPHROPATHY DOC TX: CPT | Mod: CPTII,S$GLB,, | Performed by: PHYSICIAN ASSISTANT
